# Patient Record
Sex: MALE | Race: WHITE | NOT HISPANIC OR LATINO | Employment: OTHER | URBAN - METROPOLITAN AREA
[De-identification: names, ages, dates, MRNs, and addresses within clinical notes are randomized per-mention and may not be internally consistent; named-entity substitution may affect disease eponyms.]

---

## 2017-02-23 RX ORDER — DILTIAZEM HYDROCHLORIDE 300 MG/1
300 CAPSULE, COATED, EXTENDED RELEASE ORAL DAILY
Qty: 90 CAPSULE | Refills: 2 | Status: SHIPPED | OUTPATIENT
Start: 2017-02-23 | End: 2017-08-15 | Stop reason: SDUPTHER

## 2017-04-07 RX ORDER — SIMVASTATIN 40 MG/1
40 TABLET, FILM COATED ORAL NIGHTLY
Qty: 90 TABLET | Refills: 1 | Status: SHIPPED | OUTPATIENT
Start: 2017-04-07 | End: 2017-07-07 | Stop reason: SDUPTHER

## 2017-04-12 ENCOUNTER — OFFICE VISIT (OUTPATIENT)
Dept: FAMILY MEDICINE | Facility: CLINIC | Age: 77
End: 2017-04-12
Payer: MEDICARE

## 2017-04-12 VITALS
HEART RATE: 68 BPM | HEIGHT: 73 IN | TEMPERATURE: 98 F | BODY MASS INDEX: 28.84 KG/M2 | WEIGHT: 217.63 LBS | SYSTOLIC BLOOD PRESSURE: 131 MMHG | DIASTOLIC BLOOD PRESSURE: 77 MMHG

## 2017-04-12 DIAGNOSIS — I48.20 CHRONIC ATRIAL FIBRILLATION: Primary | ICD-10-CM

## 2017-04-12 DIAGNOSIS — E11.8 TYPE 2 DIABETES MELLITUS WITH COMPLICATION, WITHOUT LONG-TERM CURRENT USE OF INSULIN: ICD-10-CM

## 2017-04-12 DIAGNOSIS — E11.59 CONTROLLED TYPE 2 DIABETES MELLITUS WITH OTHER CIRCULATORY COMPLICATION, WITHOUT LONG-TERM CURRENT USE OF INSULIN: ICD-10-CM

## 2017-04-12 PROCEDURE — 99999 PR PBB SHADOW E&M-EST. PATIENT-LVL III: CPT | Mod: PBBFAC,,, | Performed by: FAMILY MEDICINE

## 2017-04-12 PROCEDURE — 99213 OFFICE O/P EST LOW 20 MIN: CPT | Mod: PBBFAC,PO | Performed by: FAMILY MEDICINE

## 2017-04-12 PROCEDURE — 99214 OFFICE O/P EST MOD 30 MIN: CPT | Mod: S$PBB,,, | Performed by: FAMILY MEDICINE

## 2017-04-12 RX ORDER — REPAGLINIDE 1 MG/1
1 TABLET ORAL
Qty: 180 TABLET | Refills: 3 | Status: SHIPPED | OUTPATIENT
Start: 2017-04-12 | End: 2017-12-26 | Stop reason: SDUPTHER

## 2017-04-12 NOTE — MR AVS SNAPSHOT
[facility]  [address]  [place] [address]  Phone: [phone]  Fax: [phone]                  Domitila Walters   2017 3:40 PM   Office Visit    Description:  Male : 1940   Provider:  Isiah Crespo MD   Department:  [facility]           Reason for Visit     Establish Care     Hypertension     Diabetes           Diagnoses this Visit        Comments    Chronic atrial fibrillation    -  Primary     Controlled type 2 diabetes mellitus with other circulatory complication, without long-term current use of insulin         Type 2 diabetes mellitus with complication, without long-term current use of insulin                To Do List           Future Appointments        Provider Department Dept Phone    2017 9:25 AM [facility] [phone]    2017 10:40 AM Isiah Crespo MD [facility] [phone]      Goals (5 Years of Data)     None      Follow-Up and Disposition     Return in about 3 months (around 2017).       These Medications        Disp Refills Start End    repaglinide (PRANDIN) 1 MG tablet 180 tablet 3 2017    Take 1 tablet (1 mg total) by mouth 2 (two) times daily before meals. - Oral    Pharmacy: [facility] - [place], [place] - [address] #: [phone]         [facility]     [facility] -  Assistance  Unless otherwise directed by your provider, please contact [facility], our nurse care line that is available for  assistance.     Registered nurses in the [facility] provide: appointment scheduling, clinical advisement, health education, and other advisory services.  Call: [phone] (toll free)               Medications           Message regarding Medications     Verify the changes and/or additions to your medication regime listed below are the same as discussed with your clinician today.  If any  "of these changes or additions are incorrect, please notify your healthcare provider.        START taking these NEW medications        Refills    repaglinide (PRANDIN) 1 MG tablet 3    Sig: Take 1 tablet (1 mg total) by mouth 2 (two) times daily before meals.    Class: Normal    Route: Oral      STOP taking these medications     aspirin (ECOTRIN) 81 MG EC tablet Take 1 tablet (81 mg total) by mouth once daily.    glimepiride (AMARYL) 2 MG tablet TAKE ONE TABLET BY MOUTH BEFORE BREAKFAST           Verify that the below list of medications is an accurate representation of the medications you are currently taking.  If none reported, the list may be blank. If incorrect, please contact your healthcare provider. Carry this list with you in case of emergency.           Current Medications     apixaban (ELIQUIS) 2.5 mg Tab Take 2 tablets (5 mg total) by mouth 2 (two) times daily.    diltiaZEM (CARDIZEM CD) 300 MG 24 hr capsule Take 1 capsule (300 mg total) by mouth once daily.    FOLTANX 3-35-2 mg Tab TAKE ONE TABLET BY MOUTH TWICE DAILY    omeprazole (PRILOSEC) 20 MG capsule TAKE ONE CAPSULE BY MOUTH ONCE DAILY    simvastatin (ZOCOR) 40 MG tablet Take 1 tablet (40 mg total) by mouth every evening.    repaglinide (PRANDIN) 1 MG tablet Take 1 tablet (1 mg total) by mouth 2 (two) times daily before meals.           Clinical Reference Information           Your Vitals Were     BP Pulse Temp Height Weight BMI    131/77 (BP Location: Right arm, Patient Position: Sitting, BP Method: Automatic) 68 98.1 °F (36.7 °C) (Oral) 6' 1" (1.854 m) 98.7 kg (217 lb 9.5 oz) 28.71 kg/m2      Blood Pressure          Most Recent Value    BP  131/77      Allergies as of 4/12/2017     No Known Allergies      Immunizations Administered on Date of Encounter - 4/12/2017     None      Orders Placed During Today's Visit     Future Labs/Procedures Expected by Expires    CBC auto differential  4/12/2017 6/11/2018    Comprehensive metabolic panel  4/12/2017 " 6/11/2018    Hemoglobin A1c  4/12/2017 6/11/2018    Lipid panel  4/12/2017 6/11/2018    Magnesium  4/12/2017 6/11/2018    T3, free  4/12/2017 6/11/2018    TSH  4/12/2017 6/11/2018      Language Assistance Services     ATTENTION: Language assistance services are available, free of charge. Please call [phone].      ATENCIÓN: Si habla español, tiene a blackburn disposición servicios gratuitos de asistencia lingüística. Llame al [phone].     CHÚ Ý: N?u b?n nói Ti?ng Vi?t, có các d?ch v? h? tr? ngôn ng? mi?n phí dành cho b?n. G?i s? [phone].         [facility] complies with applicable Federal civil rights laws and does not discriminate on the basis of race, color, national origin, age, disability, or sex.

## 2017-04-13 ENCOUNTER — LAB VISIT (OUTPATIENT)
Dept: LAB | Facility: HOSPITAL | Age: 77
End: 2017-04-13
Attending: FAMILY MEDICINE
Payer: MEDICARE

## 2017-04-13 DIAGNOSIS — E11.59 CONTROLLED TYPE 2 DIABETES MELLITUS WITH OTHER CIRCULATORY COMPLICATION, WITHOUT LONG-TERM CURRENT USE OF INSULIN: ICD-10-CM

## 2017-04-13 DIAGNOSIS — E11.9 TYPE 2 DIABETES MELLITUS WITHOUT COMPLICATION: ICD-10-CM

## 2017-04-13 DIAGNOSIS — I48.20 CHRONIC ATRIAL FIBRILLATION: ICD-10-CM

## 2017-04-13 LAB
ALBUMIN SERPL BCP-MCNC: 3.8 G/DL
ALP SERPL-CCNC: 63 U/L
ALT SERPL W/O P-5'-P-CCNC: 35 U/L
ANION GAP SERPL CALC-SCNC: 10 MMOL/L
AST SERPL-CCNC: 24 U/L
BASOPHILS # BLD AUTO: 0.02 K/UL
BASOPHILS NFR BLD: 0.2 %
BILIRUB SERPL-MCNC: 0.5 MG/DL
BUN SERPL-MCNC: 15 MG/DL
CALCIUM SERPL-MCNC: 8.9 MG/DL
CHLORIDE SERPL-SCNC: 107 MMOL/L
CHOLEST/HDLC SERPL: 2.7 {RATIO}
CO2 SERPL-SCNC: 26 MMOL/L
CREAT SERPL-MCNC: 1.2 MG/DL
DIFFERENTIAL METHOD: ABNORMAL
EOSINOPHIL # BLD AUTO: 0.7 K/UL
EOSINOPHIL NFR BLD: 8.3 %
ERYTHROCYTE [DISTWIDTH] IN BLOOD BY AUTOMATED COUNT: 13.7 %
EST. GFR  (AFRICAN AMERICAN): >60 ML/MIN/1.73 M^2
EST. GFR  (NON AFRICAN AMERICAN): 57.2 ML/MIN/1.73 M^2
GLUCOSE SERPL-MCNC: 130 MG/DL
HCT VFR BLD AUTO: 45.5 %
HDL/CHOLESTEROL RATIO: 37.2 %
HDLC SERPL-MCNC: 156 MG/DL
HDLC SERPL-MCNC: 58 MG/DL
HGB BLD-MCNC: 15.4 G/DL
LDLC SERPL CALC-MCNC: 81.4 MG/DL
LYMPHOCYTES # BLD AUTO: 2.7 K/UL
LYMPHOCYTES NFR BLD: 32.2 %
MAGNESIUM SERPL-MCNC: 2 MG/DL
MCH RBC QN AUTO: 32.5 PG
MCHC RBC AUTO-ENTMCNC: 33.8 %
MCV RBC AUTO: 96 FL
MONOCYTES # BLD AUTO: 0.8 K/UL
MONOCYTES NFR BLD: 9.1 %
NEUTROPHILS # BLD AUTO: 4.2 K/UL
NEUTROPHILS NFR BLD: 50.1 %
NONHDLC SERPL-MCNC: 98 MG/DL
PLATELET # BLD AUTO: 213 K/UL
PMV BLD AUTO: 11.2 FL
POTASSIUM SERPL-SCNC: 4.3 MMOL/L
PROT SERPL-MCNC: 6.9 G/DL
RBC # BLD AUTO: 4.74 M/UL
SODIUM SERPL-SCNC: 143 MMOL/L
T3FREE SERPL-MCNC: 2.8 PG/ML
TRIGL SERPL-MCNC: 83 MG/DL
TSH SERPL DL<=0.005 MIU/L-ACNC: 1.5 UIU/ML
WBC # BLD AUTO: 8.35 K/UL

## 2017-04-13 PROCEDURE — 83735 ASSAY OF MAGNESIUM: CPT

## 2017-04-13 PROCEDURE — 83036 HEMOGLOBIN GLYCOSYLATED A1C: CPT

## 2017-04-13 PROCEDURE — 85025 COMPLETE CBC W/AUTO DIFF WBC: CPT

## 2017-04-13 PROCEDURE — 36415 COLL VENOUS BLD VENIPUNCTURE: CPT | Mod: PO

## 2017-04-13 PROCEDURE — 80053 COMPREHEN METABOLIC PANEL: CPT

## 2017-04-13 PROCEDURE — 84481 FREE ASSAY (FT-3): CPT

## 2017-04-13 PROCEDURE — 80061 LIPID PANEL: CPT

## 2017-04-13 PROCEDURE — 84443 ASSAY THYROID STIM HORMONE: CPT

## 2017-04-15 LAB
ESTIMATED AVG GLUCOSE: 134 MG/DL
HBA1C MFR BLD HPLC: 6.3 %

## 2017-04-19 NOTE — PROGRESS NOTES
Subjective:       Patient ID: Domitila Walters is a 77 y.o. male.    Chief Complaint: Establish Care; Hypertension; and Diabetes    HPI Comments: Patient Active Problem List:     Hyperlipidemia associated with type 2 diabetes mellitus     Hypertension, well controlled     GERD (gastroesophageal reflux disease)     Encounter for long-term (current) use of medications     Hiatal hernia     Actinic keratoses     Type 2 diabetes mellitus with complication     Chronic atrial fibrillation        Hypertension   This is a chronic problem. The problem is controlled. Pertinent negatives include no anxiety, blurred vision, chest pain, headaches, malaise/fatigue, neck pain, orthopnea, palpitations or shortness of breath. There are no associated agents to hypertension. Risk factors for coronary artery disease include diabetes mellitus, dyslipidemia, male gender and sedentary lifestyle. Hypertensive end-organ damage includes CAD/MI.   Diabetes   He presents for his follow-up diabetic visit. He has type 2 diabetes mellitus. His disease course has been improving. Pertinent negatives for hypoglycemia include no confusion, dizziness, headaches, pallor, speech difficulty or tremors. Pertinent negatives for diabetes include no blurred vision, no chest pain, no fatigue, no polydipsia, no polyphagia and no polyuria. Diabetic complications include heart disease. His breakfast blood glucose is taken between 8-9 am. His breakfast blood glucose range is generally 110-130 mg/dl.     Review of Systems   Constitutional: Negative.  Negative for activity change, appetite change, chills, diaphoresis, fatigue, fever, malaise/fatigue and unexpected weight change.   HENT: Negative.  Negative for congestion, drooling, ear discharge, ear pain, hearing loss, mouth sores, nosebleeds, postnasal drip, rhinorrhea, sinus pressure, sore throat, tinnitus, trouble swallowing and voice change.    Eyes: Negative.  Negative for blurred vision, pain, discharge,  redness, itching and visual disturbance.   Respiratory: Negative.  Negative for apnea, cough, choking, chest tightness and shortness of breath.    Cardiovascular: Negative.  Negative for chest pain, palpitations, orthopnea and leg swelling.   Gastrointestinal: Negative.  Negative for abdominal distention, abdominal pain and anal bleeding.   Endocrine: Negative.  Negative for cold intolerance, heat intolerance, polydipsia, polyphagia and polyuria.   Genitourinary: Negative.  Negative for difficulty urinating, dysuria, enuresis, flank pain, frequency, hematuria, scrotal swelling, testicular pain and urgency.   Musculoskeletal: Negative.  Negative for arthralgias, back pain, gait problem, neck pain and neck stiffness.   Skin: Negative.  Negative for color change, pallor and rash.   Allergic/Immunologic: Negative.  Negative for environmental allergies and food allergies.   Neurological: Negative.  Negative for dizziness, tremors, syncope, facial asymmetry, speech difficulty, light-headedness, numbness and headaches.   Hematological: Negative for adenopathy. Does not bruise/bleed easily.   Psychiatric/Behavioral: Negative.  Negative for agitation, behavioral problems, confusion, decreased concentration, dysphoric mood and hallucinations. The patient is not hyperactive.        Objective:      Physical Exam   Constitutional: He is oriented to person, place, and time. He appears well-developed and well-nourished. No distress.   HENT:   Head: Normocephalic and atraumatic.   Right Ear: External ear normal.   Left Ear: External ear normal.   Nose: Nose normal.   Mouth/Throat: Oropharynx is clear and moist. No oropharyngeal exudate.   Eyes: Conjunctivae and EOM are normal. Pupils are equal, round, and reactive to light. Right eye exhibits no discharge. Left eye exhibits no discharge. No scleral icterus.   Neck: Normal range of motion. Neck supple. No JVD present. No tracheal deviation present. No thyromegaly present.    Cardiovascular: Normal rate, normal heart sounds and intact distal pulses.    No murmur heard.  Pulses:       Dorsalis pedis pulses are 3+ on the right side, and 3+ on the left side.        Posterior tibial pulses are 3+ on the right side, and 3+ on the left side.   Pulmonary/Chest: Effort normal and breath sounds normal. No respiratory distress. He has no wheezes. He has no rales.   Abdominal: Soft. Bowel sounds are normal. He exhibits no distension and no mass. There is no tenderness. There is no rebound and no guarding.   Musculoskeletal: He exhibits no edema or tenderness.        Right foot: There is normal range of motion and no deformity.          Feet:   Right Foot:   Protective Sensation: 6 sites tested. 6 sites sensed.   Skin Integrity: Negative for ulcer.   Left Foot:   Protective Sensation: 6 sites tested. 6 sites sensed.   Skin Integrity: Negative for ulcer or blister.   Lymphadenopathy:     He has no cervical adenopathy.   Neurological: He is alert and oriented to person, place, and time. No cranial nerve deficit. Coordination normal.   Skin: Skin is warm and dry. No rash noted. He is not diaphoretic. No erythema. No pallor.   Psychiatric: He has a normal mood and affect. His behavior is normal. Judgment and thought content normal.   Vitals reviewed.        Chemistry        Component Value Date/Time     04/13/2017 0823    K 4.3 04/13/2017 0823     04/13/2017 0823    CO2 26 04/13/2017 0823    BUN 15 04/13/2017 0823    CREATININE 1.2 04/13/2017 0823     (H) 04/13/2017 0823        Component Value Date/Time    CALCIUM 8.9 04/13/2017 0823    ALKPHOS 63 04/13/2017 0823    AST 24 04/13/2017 0823    ALT 35 04/13/2017 0823    BILITOT 0.5 04/13/2017 0823        Lab Results   Component Value Date    HGBA1C 6.3 (H) 04/13/2017       Assessment:       1. Chronic atrial fibrillation    2. Controlled type 2 diabetes mellitus with other circulatory complication, without long-term current use of  insulin    3. Type 2 diabetes mellitus with complication, without long-term current use of insulin        Plan:       Chronic atrial fibrillation  -     TSH; Future; Expected date: 4/12/17  -     T3, free; Future; Expected date: 4/12/17  -     Magnesium; Future; Expected date: 4/12/17    Controlled type 2 diabetes mellitus with other circulatory complication, without long-term current use of insulin  -     repaglinide (PRANDIN) 1 MG tablet; Take 1 tablet (1 mg total) by mouth 2 (two) times daily before meals.  Dispense: 180 tablet; Refill: 3  -     Lipid panel; Future; Expected date: 4/12/17  -     Comprehensive metabolic panel; Future; Expected date: 4/12/17  -     Hemoglobin A1c; Future; Expected date: 4/12/17  -     CBC auto differential; Future; Expected date: 4/12/17    Type 2 diabetes mellitus with complication, without long-term current use of insulin      Patient readiness: acceptance and barriers:readiness    During the course of the visit the patient was educated and counseled about the following:     Diabetes:  Discussed general issues about diabetes pathophysiology and management.  Hypertension:   Dietary sodium restriction.  Regular aerobic exercise.  Check blood pressures daily and record.  Obesity:   General weight loss/lifestyle modification strategies discussed (elicit support from others; identify saboteurs; non-food rewards, etc).    Goals: Diabetes: Maintain Hemoglobin A1C below 7, Hypertension: Reduce Blood Pressure and Obesity: Reduce calorie intake and BMI    Did patient meet goals/outcomes: Yes    The following self management tools provided: blood pressure log  blood glucose log  excercise log    Patient Instructions (the written plan) was given to the patient/family.     Time spent with patient: 45 minutes

## 2017-04-20 RX ORDER — OMEPRAZOLE 20 MG/1
20 CAPSULE, DELAYED RELEASE ORAL DAILY
Qty: 90 CAPSULE | Refills: 3 | Status: SHIPPED | OUTPATIENT
Start: 2017-04-20 | End: 2018-09-27 | Stop reason: SDUPTHER

## 2017-06-23 DIAGNOSIS — K11.23 CHRONIC SIALOADENITIS: Primary | ICD-10-CM

## 2017-06-29 ENCOUNTER — HOSPITAL ENCOUNTER (OUTPATIENT)
Dept: RADIOLOGY | Facility: HOSPITAL | Age: 77
Discharge: HOME | End: 2017-06-29
Attending: OTOLARYNGOLOGY
Payer: MEDICARE

## 2017-06-29 DIAGNOSIS — K11.23 CHRONIC SIALOADENITIS: ICD-10-CM

## 2017-06-29 PROCEDURE — 70492 CT SFT TSUE NCK W/O & W/DYE: CPT | Mod: TC

## 2017-06-29 PROCEDURE — 70492 CT SFT TSUE NCK W/O & W/DYE: CPT | Mod: 26,,, | Performed by: RADIOLOGY

## 2017-06-29 PROCEDURE — 25500020 PHARM REV CODE 255

## 2017-06-29 RX ADMIN — IOHEXOL 75 ML: 350 INJECTION, SOLUTION INTRAVENOUS at 09:06

## 2017-07-08 RX ORDER — SIMVASTATIN 40 MG/1
TABLET, FILM COATED ORAL
Qty: 90 TABLET | Refills: 0 | Status: SHIPPED | OUTPATIENT
Start: 2017-07-08 | End: 2017-07-13 | Stop reason: SDUPTHER

## 2017-07-12 ENCOUNTER — DOCUMENTATION ONLY (OUTPATIENT)
Dept: FAMILY MEDICINE | Facility: CLINIC | Age: 77
End: 2017-07-12

## 2017-07-12 NOTE — PROGRESS NOTES
Pre-Visit Chart Review  For Appointment Scheduled on 07/13/2017    Health Maintenance Due   Topic Date Due    TETANUS VACCINE  03/03/1956    Zoster Vaccine  03/03/1998    Pneumococcal (65+) (1 of 2 - PCV13) 03/03/2003    Eye Exam  02/03/2017

## 2017-07-13 ENCOUNTER — OFFICE VISIT (OUTPATIENT)
Dept: FAMILY MEDICINE | Facility: CLINIC | Age: 77
End: 2017-07-13
Payer: MEDICARE

## 2017-07-13 VITALS
SYSTOLIC BLOOD PRESSURE: 130 MMHG | HEIGHT: 73 IN | DIASTOLIC BLOOD PRESSURE: 58 MMHG | BODY MASS INDEX: 28.05 KG/M2 | TEMPERATURE: 99 F | HEART RATE: 44 BPM | WEIGHT: 211.63 LBS

## 2017-07-13 DIAGNOSIS — E11.69 HYPERLIPIDEMIA ASSOCIATED WITH TYPE 2 DIABETES MELLITUS: ICD-10-CM

## 2017-07-13 DIAGNOSIS — Z23 NEED FOR TETANUS BOOSTER: ICD-10-CM

## 2017-07-13 DIAGNOSIS — E78.5 HYPERLIPIDEMIA ASSOCIATED WITH TYPE 2 DIABETES MELLITUS: ICD-10-CM

## 2017-07-13 DIAGNOSIS — E11.8 TYPE 2 DIABETES MELLITUS WITH COMPLICATION, WITHOUT LONG-TERM CURRENT USE OF INSULIN: Primary | ICD-10-CM

## 2017-07-13 DIAGNOSIS — L57.0 ACTINIC KERATOSES: ICD-10-CM

## 2017-07-13 DIAGNOSIS — I48.20 CHRONIC ATRIAL FIBRILLATION: ICD-10-CM

## 2017-07-13 PROCEDURE — 99999 PR PBB SHADOW E&M-EST. PATIENT-LVL III: CPT | Mod: PBBFAC,,, | Performed by: FAMILY MEDICINE

## 2017-07-13 PROCEDURE — 90714 TD VACC NO PRESV 7 YRS+ IM: CPT | Mod: PBBFAC,PO

## 2017-07-13 PROCEDURE — 99213 OFFICE O/P EST LOW 20 MIN: CPT | Mod: PBBFAC,PO | Performed by: FAMILY MEDICINE

## 2017-07-13 PROCEDURE — 99214 OFFICE O/P EST MOD 30 MIN: CPT | Mod: S$PBB,,, | Performed by: FAMILY MEDICINE

## 2017-07-13 PROCEDURE — 1159F MED LIST DOCD IN RCRD: CPT | Mod: ,,, | Performed by: FAMILY MEDICINE

## 2017-07-13 RX ORDER — SIMVASTATIN 40 MG/1
40 TABLET, FILM COATED ORAL NIGHTLY
Qty: 90 TABLET | Refills: 4 | Status: SHIPPED | OUTPATIENT
Start: 2017-07-13 | End: 2018-09-13 | Stop reason: SDUPTHER

## 2017-07-13 NOTE — PROGRESS NOTES
Subjective:       Patient ID: Domitila Walters is a 77 y.o. male.    Chief Complaint: Diabetes and Hypertension    Diabetes   He presents for his follow-up diabetic visit. He has type 2 diabetes mellitus. There are no hypoglycemic associated symptoms. Pertinent negatives for hypoglycemia include no confusion, dizziness, headaches, pallor, speech difficulty or tremors. Pertinent negatives for diabetes include no chest pain, no fatigue, no polydipsia, no polyphagia and no polyuria. There are no hypoglycemic complications. Diabetic complications include heart disease. Risk factors for coronary artery disease include diabetes mellitus, dyslipidemia, male sex, obesity and sedentary lifestyle. He is following a generally healthy diet. His breakfast blood glucose range is generally 130-140 mg/dl.   Hypertension   Pertinent negatives include no chest pain, headaches, neck pain, palpitations or shortness of breath.     Review of Systems   Constitutional: Negative.  Negative for activity change, appetite change, chills, diaphoresis, fatigue, fever and unexpected weight change.   HENT: Negative.  Negative for congestion, drooling, ear discharge, ear pain, hearing loss, mouth sores, nosebleeds, postnasal drip, rhinorrhea, sinus pressure, sore throat, tinnitus, trouble swallowing and voice change.    Eyes: Negative.  Negative for pain, discharge, redness, itching and visual disturbance.   Respiratory: Negative.  Negative for apnea, cough, choking, chest tightness and shortness of breath.    Cardiovascular: Negative.  Negative for chest pain, palpitations and leg swelling.   Gastrointestinal: Negative.  Negative for abdominal distention, abdominal pain and anal bleeding.   Endocrine: Negative.  Negative for cold intolerance, heat intolerance, polydipsia, polyphagia and polyuria.   Genitourinary: Negative.  Negative for difficulty urinating, dysuria, enuresis, flank pain, frequency, hematuria, scrotal swelling, testicular pain and  urgency.   Musculoskeletal: Negative.  Negative for arthralgias, back pain, gait problem, neck pain and neck stiffness.   Skin: Negative.  Negative for color change, pallor and rash.   Allergic/Immunologic: Negative.  Negative for environmental allergies and food allergies.   Neurological: Negative.  Negative for dizziness, tremors, syncope, facial asymmetry, speech difficulty, light-headedness, numbness and headaches.   Hematological: Negative for adenopathy. Does not bruise/bleed easily.   Psychiatric/Behavioral: Negative.  Negative for agitation, behavioral problems, confusion, decreased concentration, dysphoric mood and hallucinations. The patient is not hyperactive.        Objective:      Physical Exam   Constitutional: He is oriented to person, place, and time. He appears well-developed and well-nourished. No distress.   HENT:   Head: Normocephalic and atraumatic.   Right Ear: External ear normal.   Left Ear: External ear normal.   Nose: Nose normal.   Mouth/Throat: Oropharynx is clear and moist. No oropharyngeal exudate.   Eyes: Conjunctivae and EOM are normal. Pupils are equal, round, and reactive to light. Right eye exhibits no discharge. Left eye exhibits no discharge. No scleral icterus.   Neck: Normal range of motion. Neck supple. No JVD present. No tracheal deviation present. No thyromegaly present.   Cardiovascular: Normal rate, normal heart sounds and intact distal pulses.    No murmur heard.  Pulses:       Dorsalis pedis pulses are 3+ on the right side, and 3+ on the left side.        Posterior tibial pulses are 3+ on the right side, and 3+ on the left side.   Pulmonary/Chest: Effort normal and breath sounds normal. No respiratory distress. He has no wheezes. He has no rales.   Abdominal: Soft. Bowel sounds are normal. He exhibits no distension and no mass. There is no tenderness. There is no rebound and no guarding.   Musculoskeletal: He exhibits no edema or tenderness.        Right foot: There is  normal range of motion and no deformity.          Feet:   Right Foot:   Protective Sensation: 6 sites tested. 6 sites sensed.   Skin Integrity: Negative for ulcer.   Left Foot:   Protective Sensation: 6 sites tested. 6 sites sensed.   Skin Integrity: Negative for ulcer or blister.   Lymphadenopathy:     He has no cervical adenopathy.   Neurological: He is alert and oriented to person, place, and time. No cranial nerve deficit. Coordination normal.   Skin: Skin is warm and dry. No rash noted. He is not diaphoretic. No erythema. No pallor.   Psychiatric: He has a normal mood and affect. His behavior is normal. Judgment and thought content normal.   Vitals reviewed.      Assessment:       1. Type 2 diabetes mellitus with complication, without long-term current use of insulin    2. Hyperlipidemia associated with type 2 diabetes mellitus    3. Need for tetanus booster        Plan:       Type 2 diabetes mellitus with complication, without long-term current use of insulin    Hyperlipidemia associated with type 2 diabetes mellitus    Need for tetanus booster  -     (In Office Administered) Td Vaccine - Preservative Free    Other orders  -     simvastatin (ZOCOR) 40 MG tablet; Take 1 tablet (40 mg total) by mouth every evening.  Dispense: 90 tablet; Refill: 4      Patient readiness: acceptance and barriers:readiness and social stressors    During the course of the visit the patient was educated and counseled about the following:     Diabetes:  Discussed general issues about diabetes pathophysiology and management.  Hypertension:   Dietary sodium restriction.  Regular aerobic exercise.  Obesity:   General weight loss/lifestyle modification strategies discussed (elicit support from others; identify saboteurs; non-food rewards, etc).    Goals: Diabetes: Maintain Hemoglobin A1C below 7, Hypertension: Reduce Blood Pressure and Obesity: Reduce calorie intake and BMI    Did patient meet goals/outcomes: Yes    The following self  management tools provided: blood pressure log  blood glucose log  excercise log    Patient Instructions (the written plan) was given to the patient/family.     Time spent with patient: 45 minutes

## 2017-07-13 NOTE — PATIENT INSTRUCTIONS
Diabetes (General Information)  Diabetes is a long-term health problem. It means your body does not make enough insulin. Or it may mean that your body cannot use the insulin it makes. Insulin is a hormone in your body. It lets blood sugar (glucose) reach the cells in your body. All of your cells need glucose for fuel.  When you have diabetes, the glucose in your blood builds up because it cannot get into the cells. This buildup is called high blood sugar (hyperglycemia).  Your blood sugar level depends on several things. It depends on what kind of food you eat and how much of it you eat. It also depends on how much exercise you get, and how much insulin you have in your body. Eating too much of the wrong kinds of food or not taking diabetes medicine on time can cause high blood sugar. Infections can cause high blood sugar even if you are taking medicines correctly.  These things can also cause low blood sugar:  · Missing meals  · Not eating enough food  · Taking too much diabetes medicine  Diabetes can cause serious problems over time if you do not get treated. These problems include heart disease, stroke, kidney failure, and blindness. They also include nerve pain or loss of feeling in your legs and feet, and gangrene of the feet. By keeping your blood sugar under control you can prevent or delay these problems.  Normal blood sugar levels are 80 to 100 before a meal and less than 180 in the 1 to 2 hours after a meal.  Home care  Follow these guidelines when caring for yourself at home:  · Follow the diet your healthcare provider gives you. Take insulin or other diabetes medicine exactly as told to.  · Watch your blood sugar as you are told to. Keep a log of your results. This will help your provider change your medicines to keep your blood sugar under control.  · Try to reach your ideal weight. You may be able to cut back on or not have to take diabetes medicine if you eat the right foods and get exercise.  · Do  not smoke. Smoking worsens the effects of diabetes on your circulation. You are much more likely to have a heart attack if you have diabetes and you smoke.  · Take good care of your feet. If you have lost feeling in your feet, you may not see an injury or infection. Check your feet and between your toes at least once a week.  · Wear a medical alert bracelet or necklace, or carry a card in your wallet that says you have diabetes. This will help healthcare providers give you the right care if you get very ill and cannot tell them that you have diabetes.  Sick day plan  If you get a cold, the flu, or a bacterial or viral infection, take these steps:  · Look at your diabetes sick plan and call your healthcare provider as you were told to. You may need to call your provider right away if:  ¨ Your blood sugar is above 240 while taking your diabetes medicine  ¨ Your urine ketone levels are above normal or high  ¨ You have been vomiting more than 6 hours  ¨ You have trouble breathing or your breath ha s a fruity smell  ¨ You have a high fever  ¨ You have a fever for several days and you are not getting better  ¨ You get light-headed and are sleepier than usual  · Keep taking your diabetes pills (oral medicine) even if you have been vomiting and are feeling sick. Call your provider right away because you may need insulin to lower your blood sugar until you recover from your illness.  · Keep taking your insulin even if you have been vomiting and are feeling sick. Call your provider right away to ask if you need to change your insulin dose. This will depend on your blood sugar results.  · Check your blood sugar every 2 to 4 hours, or at least 4 times a day.  · Check your ketones often. If you are vomiting and having diarrhea, watch them more often.  · Do not skip meals. Try to eat small meals on a regular schedule. Do this even if you do not feel like eating.  · Drink water or other liquids that do not have caffeine or  calories. This will keep you from getting dehydrated. If you are nauseated or vomiting, takes small sips every 5 minutes. To prevent dehydration try to drink a cup (8 ounces) of fluids every hour while you are awake.  General care  Always bring a source of fast-acting sugar with you in case you have symptoms of low blood sugar (below 70). At the first sign of low blood sugar, eat or drink 15 to 20 grams of fast-acting sugar to raise your blood sugar. Examples are:  · 3 to 4 glucose tablets. You can buy these at most [facility].  · 4 ounces (1/2 cup) of regular (not diet) soft drinks  · 4 ounces (1/2 cup) of any fruit juice  · 8 ounces (1 cup) of milk  · 5 to 6 pieces of hard candy  · 1 tablespoon of honey  Check your blood sugar 15 minutes after treating yourself. If it is still below 70, take 15 to 20 more grams of fast-acting sugar. Test again in 15 minutes. If it returns to normal (70 or above), eat a snack or meal to keep your blood sugar in a safe range. If it stays low, call your doctor or go to an emergency room.  Follow-up care  Follow-up with your healthcare provider, or as advised. For more information about diabetes, visit the American Diabetes Association website at www.diabetes.org or call 995-181-6425.  When to seek medical advice  Call your healthcare provider right away if you have any of these symptoms of high blood sugar:  · Frequent urination  · Dizziness  · Drowsiness  · Thirst  · Headache  · Nausea or vomiting  · Abdominal pain  · Eyesight changes  · Fast breathing  · Confusion or loss of consciousness  Also call your provider right away if you have any of these signs of low blood sugar:  · Fatigue  · Headache  · Shakes  · Excess sweating  · Hunger  · Feeling anxious or restless  · Eyesight changes  · Drowsiness  · Weakness  · Confusion or loss of consciousness  Call 911  Call for emergency help right away if any of these occur:  · Chest pain or shortness of breath  · Dizziness or  fainting  · Weakness of an arm or leg or one side of the face  · Trouble speaking or seeing   Date Last Reviewed: 6/1/2016  © 6398-0323 [employer]. [address] [place] [address], [place] [address]. All rights reserved. This information is not intended as a substitute for professional medical care. Always follow your healthcare professional's instructions.        Established High Blood Pressure    High blood pressure (hypertension) is a chronic disease. Often health care providers dont know what causes it. But it can be caused by certain health conditions and medicines.  If you have high blood pressure, you may not have any symptoms. If you do have symptoms, they may include headache, dizziness, changes in your vision, chest pain, and shortness of breath. But even without symptoms, high blood pressure thats not treated raises your risk for heart attack and stroke. High blood pressure is a serious health risk and shouldnt be ignored.  A blood pressure reading is made up of two numbers: a higher number over a lower number. The top number is the systolic pressure. The bottom number is the diastolic pressure. A normal blood pressure is less than 120 over less than 80.  High blood pressure is when either the top number is 140 or higher, or the bottom number is 90 or higher. This must be the result when taking your blood pressure a number of times. The blood pressures between normal and high are called prehypertension.  Home care  If you have high blood pressure, you should do what is listed below to lower your blood pressure. If you are taking medicines for high blood pressure, these methods may reduce or end your need for medicines in the future.  · Begin a weight-loss program if you are overweight.  · Cut back on how much salt you get in your diet. Heres how to do this:  ¨ Dont eat foods that have a lot of salt. These include olives, pickles, smoked meats, and salted potato chips.  ¨ Dont add salt to your  food at the table.  ¨ Use only small amounts of salt when cooking.  · Begin an exercise program. Talk with your health care provider about the type of exercise program that would be best for you. It doesn't have to be hard. Even brisk walking for 20 minutes 3 times a week is a good form of exercise.  · Dont take medicines that have heart stimulants. This includes many cold and sinus decongestant pills and sprays, as well as diet pills. Check the warnings about hypertension on the label. Stimulants such as amphetamine or cocaine could be lethal for someone with high blood pressure. Never take these.  · Limit how much caffeine you get in your diet. Switch to caffeine-free products.  · Stop smoking. If you are a long-time smoker, this can be hard. Enroll in a stop-smoking program to make it more likely that you will quit for good.  · Learn how to handle stress. This is an important part of any program to lower blood pressure. Learn about relaxation methods like meditation, yoga, or biofeedback.  · If your provider prescribed medicines, take them exactly as directed. Missing doses may cause your blood pressure get out of control.  · Consider buying an automatic blood pressure machine. You can get one of these at most pharmacies. Use this to watch your blood pressure at home. Give the results to your provider.  Follow-up care  You will need to make regular visits to your health care provider. This is to check your blood pressure and to make changes to your medicines. Make a follow-up appointment as directed.  When to seek medical advice  Call your health care provider right away if any of these occur:  · Chest pain or shortness of breath  · Severe headache  · Throbbing or rushing sound in the ears  · Nosebleed  · Sudden severe pain in your belly (abdomen)  · Extreme drowsiness, confusion, or fainting  · Dizziness or dizziness with a spinning sensation (vertigo)  · Weakness of an arm or leg or one side of the face  · You  have problems speaking or seeing   Date Last Reviewed: 11/25/2014  © 6763-2634 [employer]. [address] [place] [address], [place] [address]. All rights reserved. This information is not intended as a substitute for professional medical care. Always follow your healthcare professional's instructions.

## 2017-07-13 NOTE — PROGRESS NOTES
2 patient identifiers used (name and ). Administered Td vaccine IM. Patient tolerated well, no bleeding at insertion site noted. Pain scale 1/10. Aseptic technique maintained. Immunization information given to patient.

## 2017-07-14 DIAGNOSIS — R22.1 NECK MASS: Primary | ICD-10-CM

## 2017-07-19 ENCOUNTER — HOSPITAL ENCOUNTER (OUTPATIENT)
Dept: RADIOLOGY | Facility: HOSPITAL | Age: 77
Discharge: HOME | End: 2017-07-19
Attending: OTOLARYNGOLOGY
Payer: MEDICARE

## 2017-07-19 DIAGNOSIS — R22.1 NECK MASS: ICD-10-CM

## 2017-07-19 PROCEDURE — 70543 MRI ORBT/FAC/NCK W/O &W/DYE: CPT | Mod: TC

## 2017-07-19 PROCEDURE — A9585 GADOBUTROL INJECTION: HCPCS | Performed by: OTOLARYNGOLOGY

## 2017-07-19 PROCEDURE — 70543 MRI ORBT/FAC/NCK W/O &W/DYE: CPT | Mod: 26,,, | Performed by: RADIOLOGY

## 2017-07-19 PROCEDURE — 25500020 PHARM REV CODE 255: Performed by: OTOLARYNGOLOGY

## 2017-07-19 RX ORDER — GADOBUTROL 604.72 MG/ML
9 INJECTION INTRAVENOUS
Status: COMPLETED | OUTPATIENT
Start: 2017-07-19 | End: 2017-07-19

## 2017-07-19 RX ADMIN — GADOBUTROL 9 ML: 604.72 INJECTION INTRAVENOUS at 05:07

## 2017-08-08 ENCOUNTER — OFFICE VISIT (OUTPATIENT)
Dept: OTOLARYNGOLOGY | Facility: CLINIC | Age: 77
End: 2017-08-08
Payer: MEDICARE

## 2017-08-08 VITALS
BODY MASS INDEX: 28.1 KG/M2 | TEMPERATURE: 98 F | WEIGHT: 212.94 LBS | DIASTOLIC BLOOD PRESSURE: 89 MMHG | SYSTOLIC BLOOD PRESSURE: 133 MMHG | HEART RATE: 74 BPM

## 2017-08-08 DIAGNOSIS — D37.039 NEOPLASM OF UNCERTAIN BEHAVIOR OF MAJOR SALIVARY GLAND: ICD-10-CM

## 2017-08-08 PROCEDURE — 3008F BODY MASS INDEX DOCD: CPT | Mod: ,,, | Performed by: OTOLARYNGOLOGY

## 2017-08-08 PROCEDURE — 3075F SYST BP GE 130 - 139MM HG: CPT | Mod: ,,, | Performed by: OTOLARYNGOLOGY

## 2017-08-08 PROCEDURE — 1126F AMNT PAIN NOTED NONE PRSNT: CPT | Mod: ,,, | Performed by: OTOLARYNGOLOGY

## 2017-08-08 PROCEDURE — 99214 OFFICE O/P EST MOD 30 MIN: CPT | Mod: PBBFAC | Performed by: OTOLARYNGOLOGY

## 2017-08-08 PROCEDURE — 99999 PR PBB SHADOW E&M-EST. PATIENT-LVL IV: CPT | Mod: PBBFAC,,, | Performed by: OTOLARYNGOLOGY

## 2017-08-08 PROCEDURE — 1159F MED LIST DOCD IN RCRD: CPT | Mod: ,,, | Performed by: OTOLARYNGOLOGY

## 2017-08-08 PROCEDURE — 99204 OFFICE O/P NEW MOD 45 MIN: CPT | Mod: S$PBB,,, | Performed by: OTOLARYNGOLOGY

## 2017-08-08 PROCEDURE — 3079F DIAST BP 80-89 MM HG: CPT | Mod: ,,, | Performed by: OTOLARYNGOLOGY

## 2017-08-08 NOTE — PROGRESS NOTES
"[facility]    Subjective:       Patient ID: Domitila Walters is a 77 y.o. male.    Chief Complaint: Consult (left parotid mass)    HPI     Pertinent Treatment History:  1. I+D of left parotid mass, 8/26/2013 (Dr. Montanez)    Domitila Walters is a 77 y.o. male who was referred by Dr. Saad Herrera for evaluation of a left parotid mass.  He dates his history to about 3 years ago, when he developed left parotid swelling and redness that ultimately triggered a transfer to [facility] and merited an I+D. He had another episode of enlargement/swelling with pain extending to the ear and mandible last year, and then another within the last month. He met with Dr. Herrera at that time and underwent a CT scan and an MRI, prompting a referral to me. He has no pain currently, but there is an "awareness" that there is something there. He has no history of local or regional skin cancers, although he has had some AKs treated with cryotherapy or biopsy over the years on his arms.  Between the 3 episodes, he did not notice a mass or issue with the left parotid gland.     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 He was treated with prednisone with some response - he has been treated with antibiotics in the past without improvement. He denies dysphagia, odynophagia, throat pain, and otalgia. He is a non-smoker. The patient has experienced such swelling previously. He has no axillary or inguinal adenopathy and denies fevers, chills, nightsweats, fatigue, and weight loss. He denies facial weakness, but there is some expected numbness around his prior incision.     He presents for evaluation and management of this problem.     Past Medical History:   Diagnosis Date    GERD (gastroesophageal reflux disease)     Hyperlipemia     Hypertension        Past Surgical History:   Procedure Laterality Date    ABCESS DRAINAGE Left 08/26/2013    COLONOSCOPY   "    SKIN GRAFT Left     TONSILLECTOMY           Current Outpatient Prescriptions:     apixaban (ELIQUIS) 2.5 mg Tab, Take 2 tablets (5 mg total) by mouth 2 (two) times daily., Disp: 60 tablet, Rfl: 10    diltiaZEM (CARDIZEM CD) 300 MG 24 hr capsule, Take 1 capsule (300 mg total) by mouth once daily., Disp: 90 capsule, Rfl: 2    FOLTANX 3-35-2 mg Tab, TAKE ONE TABLET BY MOUTH TWICE DAILY, Disp: 180 tablet, Rfl: 0    omeprazole (PRILOSEC) 20 MG capsule, Take 1 capsule (20 mg total) by mouth once daily., Disp: 90 capsule, Rfl: 3    repaglinide (PRANDIN) 1 MG tablet, Take 1 tablet (1 mg total) by mouth 2 (two) times daily before meals., Disp: 180 tablet, Rfl: 3    simvastatin (ZOCOR) 40 MG tablet, Take 1 tablet (40 mg total) by mouth every evening., Disp: 90 tablet, Rfl: 4    Review of patient's allergies indicates:  No Known Allergies    Social History     Social History    Marital status:      Spouse name: N/A    Number of children: N/A    Years of education: N/A     Occupational History    Not on file.     Social History Main Topics    Smoking status: Never Smoker    Smokeless tobacco: Not on file    Alcohol use Yes      Comment: occassionally    Drug use: No    Sexual activity: Not on file     Other Topics Concern    Not on file     Social History Narrative    He is retired from the [employer] industry. 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            Family History   Problem Relation Age of Onset    No Known Problems Mother     No Known Problems Father     No Known Problems Maternal Grandmother     No Known Problems Maternal Grandfather     Glaucoma Neg Hx     Macular degeneration Neg Hx     Retinal detachment Neg Hx     Cancer Neg Hx     Diabetes Neg Hx     Heart failure Neg Hx      Review of Systems   Constitutional: Negative for fatigue, fever and unexpected weight change.   HENT: Negative for ear discharge, facial swelling, hearing loss,  mouth sores, rhinorrhea, sore throat, tinnitus, trouble swallowing and voice change.    Eyes: Negative for pain and visual disturbance.   Respiratory: Negative for cough and shortness of breath.    Cardiovascular: Negative for chest pain and palpitations.   Gastrointestinal: Negative for abdominal pain, constipation and diarrhea.   Genitourinary: Negative for difficulty urinating and dysuria.   Musculoskeletal: Positive for arthralgias. Negative for back pain and neck pain.   Skin: Negative for color change and rash.   Neurological: Negative for dizziness, seizures and headaches.   Hematological: Positive for adenopathy (left neck mass). Does not bruise/bleed easily.   Psychiatric/Behavioral: Negative for agitation. The patient is not nervous/anxious.        Objective:      Physical Exam   Constitutional: He is oriented to person, place, and time. He appears well-developed and well-nourished. He is cooperative.   HENT:   Head: Normocephalic and atraumatic.       Right Ear: Hearing, tympanic membrane, external ear and ear canal normal.   Left Ear: Hearing, tympanic membrane, external ear and ear canal normal.   Nose: Nose normal. No rhinorrhea, nasal deformity or septal deviation. Right sinus exhibits no maxillary sinus tenderness and no frontal sinus tenderness. Left sinus exhibits no maxillary sinus tenderness and no frontal sinus tenderness.   Mouth/Throat: Uvula is midline, oropharynx is clear and moist and mucous membranes are normal. He does not have dentures. No oral lesions. No trismus in the jaw. No oropharyngeal exudate or posterior oropharyngeal edema.       .   Eyes: Conjunctivae and EOM are normal. Pupils are equal, round, and reactive to light. Right eye exhibits no chemosis. Left eye exhibits no chemosis.   Neck: Trachea normal, normal range of motion and phonation normal. Neck supple. No JVD present. No tracheal tenderness present. No tracheal deviation and no edema present. No thyroid mass and no  thyromegaly present.   Salivary glands - there are no lesions, and there is no asymmetry of the parotid and submandibular glands   Cardiovascular: Normal rate, regular rhythm and intact distal pulses.    Pulmonary/Chest: Effort normal. No accessory muscle usage or stridor. No tachypnea. No respiratory distress.   Abdominal: Normal appearance. He exhibits no distension. There is no guarding.   Lymphadenopathy:     He has no cervical adenopathy.        Right cervical: No deep cervical and no posterior cervical adenopathy present.       Left cervical: No deep cervical and no posterior cervical adenopathy present.        Right: No supraclavicular adenopathy present.        Left: No supraclavicular adenopathy present.   Neurological: He is alert and oriented to person, place, and time. No cranial nerve deficit. Gait normal.   Skin: Skin is warm and dry. No lesion noted.   Psychiatric: He has a normal mood and affect. His speech is normal.   Vitals reviewed.      MRI 7/19/2017:  Impression      Left parotid space solid mass with considerations including benign mixed tumor, Warthin tumor, low-grade adenoid cystic carcinoma, and low-grade mucoid epidermoid carcinoma.  High-grade malignancy is not excluded however less likely given circumscribed, non-infiltrative appearance.  Biopsy or excision is recommended.     CT Neck 6/29/2017:  Impression       1.  Approximately 3 cm, well-circumscribed ovoid soft tissue mass involving the deep lobe of the left parotid gland. Common differential considerations include a benign mixed tumor and a Warthin tumor.    2. Otherwise, no CT findings to explain this patient's symptoms. Chronic sinus disease and evidence of prior paranasal sinus surgery is also noted.     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 The edges on the MRI are a little fuzzy, but the math favors  a benign etiology. However, in order to facilitate surgical counseling and shared decision making, I have ordered an ultrasound and ultrasound FNA of the parotid. He will return after the FNA for treatment planning. We did discuss that his options are fundamentally observation (based on the probability that this is benign), the US-FNA, or moving directly to surgery. He is aware that surgery may be the ultimate recommendation, but both he and I would like some more information moving forward.         Relevant Orders    US Soft Tissue Head Neck Thyroid    US Fine Needle Aspiration with Imaging      Other Visit Diagnoses    None.

## 2017-08-08 NOTE — ASSESSMENT & PLAN NOTE
He has a deep lobe left parotid tumor. The edges on the MRI are a little fuzzy, but the math favors a benign etiology. However, in order to facilitate surgical counseling and shared decision making, I have ordered an ultrasound and ultrasound FNA of the parotid. He will return after the FNA for treatment planning. We did discuss that his options are fundamentally observation (based on the probability that this is benign), the US-FNA, or moving directly to surgery. He is aware that surgery may be the ultimate recommendation, but both he and I would like some more information moving forward.

## 2017-08-08 NOTE — LETTER
August 8, 2017      Saad Herrera MD  [address]  [facility] [address]           [facility]  [address]  [facility] [address]  Phone: [phone]  Fax: [phone]          Patient: Domitila Walters   MR Number: [id number]   YOB: 1940   Date of Visit: 8/8/2017       Dear Dr. Saad Herrera:    Thank you for referring Domitila Walters to me for evaluation. Attached you will find relevant portions of my assessment and plan of care.    If you have questions, please do not hesitate to call me. I look forward to following Domitila Walters along with you.    Sincerely,    Sam Bo MD    Enclosure  CC:  No Recipients    If you would like to receive this communication electronically, please contact [email or web address] or [phone] to request more information on [facility] access.    For providers and/or their staff who would like to refer a patient to [facility], please contact us through our one-stop-shop provider referral line, [facility], at [phone].    If you feel you have received this communication in error or would no longer like to receive these types of communications, please e-mail [email or web address]

## 2017-08-15 RX ORDER — DILTIAZEM HYDROCHLORIDE 300 MG/1
CAPSULE, COATED, EXTENDED RELEASE ORAL
Qty: 90 CAPSULE | Refills: 11 | Status: SHIPPED | OUTPATIENT
Start: 2017-08-15 | End: 2018-10-25 | Stop reason: SDUPTHER

## 2017-08-21 ENCOUNTER — HOSPITAL ENCOUNTER (OUTPATIENT)
Dept: RADIOLOGY | Facility: HOSPITAL | Age: 77
Discharge: HOME | End: 2017-08-21
Attending: OTOLARYNGOLOGY
Payer: MEDICARE

## 2017-08-21 DIAGNOSIS — D37.039 NEOPLASM OF UNCERTAIN BEHAVIOR OF MAJOR SALIVARY GLAND: ICD-10-CM

## 2017-08-21 PROCEDURE — 76536 US EXAM OF HEAD AND NECK: CPT | Mod: 26,GC,, | Performed by: RADIOLOGY

## 2017-08-21 PROCEDURE — 76536 US EXAM OF HEAD AND NECK: CPT | Mod: TC

## 2017-09-25 ENCOUNTER — PATIENT MESSAGE (OUTPATIENT)
Dept: OTOLARYNGOLOGY | Facility: CLINIC | Age: 77
End: 2017-09-25

## 2017-09-25 DIAGNOSIS — D37.039 NEOPLASM OF UNCERTAIN BEHAVIOR OF MAJOR SALIVARY GLAND: Primary | ICD-10-CM

## 2017-09-25 DIAGNOSIS — I48.91 ATRIAL FIBRILLATION, UNSPECIFIED TYPE: ICD-10-CM

## 2017-09-27 ENCOUNTER — PATIENT MESSAGE (OUTPATIENT)
Dept: OTOLARYNGOLOGY | Facility: CLINIC | Age: 77
End: 2017-09-27

## 2017-09-28 ENCOUNTER — LAB VISIT (OUTPATIENT)
Dept: LAB | Facility: HOSPITAL | Age: 77
End: 2017-09-28
Attending: NURSE PRACTITIONER
Payer: MEDICARE

## 2017-09-28 DIAGNOSIS — D37.039 NEOPLASM OF UNCERTAIN BEHAVIOR OF MAJOR SALIVARY GLAND: ICD-10-CM

## 2017-09-28 DIAGNOSIS — I48.91 ATRIAL FIBRILLATION, UNSPECIFIED TYPE: ICD-10-CM

## 2017-09-28 LAB
BASOPHILS # BLD AUTO: 0 K/UL
BASOPHILS NFR BLD: 0.4 %
DIFFERENTIAL METHOD: ABNORMAL
EOSINOPHIL # BLD AUTO: 0.4 K/UL
EOSINOPHIL NFR BLD: 3.8 %
ERYTHROCYTE [DISTWIDTH] IN BLOOD BY AUTOMATED COUNT: 13.9 %
HCT VFR BLD AUTO: 41 %
HGB BLD-MCNC: 13.8 G/DL
INR PPP: 1
LYMPHOCYTES # BLD AUTO: 2.3 K/UL
LYMPHOCYTES NFR BLD: 25 %
MCH RBC QN AUTO: 32.5 PG
MCHC RBC AUTO-ENTMCNC: 33.6 G/DL
MCV RBC AUTO: 97 FL
MONOCYTES # BLD AUTO: 0.7 K/UL
MONOCYTES NFR BLD: 7.5 %
NEUTROPHILS # BLD AUTO: 5.8 K/UL
NEUTROPHILS NFR BLD: 63.3 %
PLATELET # BLD AUTO: 396 K/UL
PMV BLD AUTO: 7.7 FL
PROTHROMBIN TIME: 10.7 SEC
RBC # BLD AUTO: 4.25 M/UL
WBC # BLD AUTO: 9.2 K/UL

## 2017-09-28 PROCEDURE — 85025 COMPLETE CBC W/AUTO DIFF WBC: CPT

## 2017-09-28 PROCEDURE — 85610 PROTHROMBIN TIME: CPT

## 2017-09-28 PROCEDURE — 36415 COLL VENOUS BLD VENIPUNCTURE: CPT

## 2017-09-28 RX ORDER — APIXABAN 5 MG/1
TABLET, FILM COATED ORAL
Qty: 180 TABLET | Refills: 3 | Status: SHIPPED | OUTPATIENT
Start: 2017-09-28 | End: 2018-01-05 | Stop reason: SDUPTHER

## 2017-10-04 ENCOUNTER — HOSPITAL ENCOUNTER (OUTPATIENT)
Dept: RADIOLOGY | Facility: HOSPITAL | Age: 77
Discharge: HOME | End: 2017-10-04
Attending: OTOLARYNGOLOGY
Payer: MEDICARE

## 2017-10-04 DIAGNOSIS — D37.039 NEOPLASM OF UNCERTAIN BEHAVIOR OF MAJOR SALIVARY GLAND: ICD-10-CM

## 2017-10-04 NOTE — H&P
Radiology History & Physical      SUBJECTIVE:     Chief Complaint: L parotid lesion    History of Present Illness:  Domitila Walters is a 77 y.o. 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 Authorizing Provider   apixaban (ELIQUIS) 2.5 mg Tab Take 2 tablets (5 mg total) by mouth 2 (two) times daily. 9/23/16   Rafal Lewis MD   diltiaZEM (CARDIZEM CD) 300 MG 24 hr capsule TAKE ONE CAPSULE BY MOUTH EVERY DAY 8/15/17   Isiah Crespo MD   ELIQUIS 5 mg Tab TAKE ONE TABLET BY MOUTH TWICE DAILY 9/28/17   Rafal Lewis MD   FOLTANX 3-35-2 mg Tab TAKE ONE TABLET BY MOUTH TWICE DAILY 4/1/16   Scott Marie DPM   omeprazole (PRILOSEC) 20 MG capsule Take 1 capsule (20 mg total) by mouth once daily. 4/20/17   MARLEN Harris   repaglinide (PRANDIN) 1 MG tablet Take 1 tablet (1 mg total) by mouth 2 (two) times daily before meals. 4/12/17 4/12/18  Isiah Crespo MD   simvastatin (ZOCOR) 40 MG tablet Take 1 tablet (40 mg total) by mouth every evening. 7/13/17   Isiah Crespo MD     Anticoagulants/Antiplatelets: eloquis held only 24 hours    Allergies: Review of patient's allergies indicates:  No Known Allergies  Sedation History:  no adverse reactions    Review of Systems:   Hematological: no known coagulopathies  Respiratory: no shortness of breath  Cardiovascular: no chest pain  Gastrointestinal: no abdominal pain  Genito-Urinary: no dysuria  Musculoskeletal: negative  Neurological: no TIA or stroke symptoms         OBJECTIVE:     Vital Signs (Most Recent)        Physical Exam:  ASA: 2  Mallampati: 2    General: no acute distress  Mental Status: alert and oriented to person, place and time  HEENT: normocephalic, atraumatic  Chest: unlabored breathing  Heart: regular heart rate  Abdomen: nondistended  Extremity: moves all extremities    Laboratory  Lab Results   Component Value Date    INR 1.0 09/28/2017       Lab Results   Component Value Date    WBC 9.20 09/28/2017    HGB 13.8 (L) 09/28/2017    HCT 41.0 09/28/2017    MCV 97 09/28/2017     (H) 09/28/2017      Lab Results   Component Value Date     (H) 06/29/2017     06/29/2017    K 4.2 06/29/2017     06/29/2017    CO2 29 06/29/2017    BUN 17 06/29/2017    CREATININE 1.1 06/29/2017    CALCIUM 8.9 06/29/2017    MG 2.0 04/13/2017    ALT 35 04/13/2017    AST 24 04/13/2017    ALBUMIN 3.8 04/13/2017    BILITOT 0.5 04/13/2017       ASSESSMENT/PLAN:     Sedation none  Plan: Patient to be rescheduled for tomorrow at 2PM to increase risk of bleeding    Daniel Bolton MD  Radiology

## 2017-10-05 ENCOUNTER — HOSPITAL ENCOUNTER (OUTPATIENT)
Dept: RADIOLOGY | Facility: HOSPITAL | Age: 77
Discharge: HOME | End: 2017-10-05
Attending: OTOLARYNGOLOGY
Payer: MEDICARE

## 2017-10-05 PROCEDURE — 88305 TISSUE EXAM BY PATHOLOGIST: CPT | Performed by: PATHOLOGY

## 2017-10-05 PROCEDURE — 88172 CYTP DX EVAL FNA 1ST EA SITE: CPT | Mod: 26,,, | Performed by: PATHOLOGY

## 2017-10-05 PROCEDURE — 88173 CYTOPATH EVAL FNA REPORT: CPT | Mod: 26,,, | Performed by: PATHOLOGY

## 2017-10-05 PROCEDURE — 10022 US FINE NEEDLE ASPIRATION WITH IMAGING: CPT | Mod: GC,,, | Performed by: RADIOLOGY

## 2017-10-05 PROCEDURE — 76942 ECHO GUIDE FOR BIOPSY: CPT | Mod: TC

## 2017-10-05 PROCEDURE — 88172 CYTP DX EVAL FNA 1ST EA SITE: CPT | Performed by: PATHOLOGY

## 2017-10-05 PROCEDURE — 88305 TISSUE EXAM BY PATHOLOGIST: CPT | Mod: 26,,, | Performed by: PATHOLOGY

## 2017-10-05 PROCEDURE — 76942 ECHO GUIDE FOR BIOPSY: CPT | Mod: 26,GC,, | Performed by: RADIOLOGY

## 2017-10-05 NOTE — PROCEDURES
Radiology Post-Procedure Note    Pre Op Diagnosis: Parotid nodule    Post Op Diagnosis: same    Procedure: Ultrasound guided parotid biopsy    Procedure performed by: Daniel Murguia MD    Written Informed Consent Obtained: Yes    Specimen Removed: YES - 2 core 18 G biopsy samples.     Estimated Blood Loss: Minimal    Findings: Local anesthesia was used.    18G core biopsy was performed for evaluation of left sided parotid nodule using ultrasound guidance.  Pathology was present during the examination to evaluate adequacy of the specimen which was sent to the laboratory for further analysis.    There were no complications and the patient tolerated the procedure well.  Please see Imaging report for further details.    Derek Wagner  R-4  Pager: [phone]

## 2017-10-05 NOTE — H&P
Radiology History & Physical      SUBJECTIVE:     Chief Complaint: Left parotid lesion.     History of Present Illness:  Domitila Walters is a 77 y.o. male who presents for US guided core biopsy and aspiration of a left parotid mixed cystic and solid lesion.     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 Authorizing Provider   apixaban (ELIQUIS) 2.5 mg Tab Take 2 tablets (5 mg total) by mouth 2 (two) times daily. 9/23/16   Rafal Lewis MD   diltiaZEM (CARDIZEM CD) 300 MG 24 hr capsule TAKE ONE CAPSULE BY MOUTH EVERY DAY 8/15/17   Isiah Crespo MD   ELIQUIS 5 mg Tab TAKE ONE TABLET BY MOUTH TWICE DAILY 9/28/17   Rafal Lewis MD   FOLTANX 3-35-2 mg Tab TAKE ONE TABLET BY MOUTH TWICE DAILY 4/1/16   Scott Marie DPM   omeprazole (PRILOSEC) 20 MG capsule Take 1 capsule (20 mg total) by mouth once daily. 4/20/17   MARLEN Harris   repaglinide (PRANDIN) 1 MG tablet Take 1 tablet (1 mg total) by mouth 2 (two) times daily before meals. 4/12/17 4/12/18  Isiah Crespo MD   simvastatin (ZOCOR) 40 MG tablet Take 1 tablet (40 mg total) by mouth every evening. 7/13/17   Isiah Crespo MD     Anticoagulants/Antiplatelets: Apixaban held x 24 hours.     Allergies: Review of patient's allergies indicates:  No Known Allergies  Sedation History:  no adverse reactions    Review of Systems:   Hematological: no known coagulopathies  Respiratory: no shortness of breath  Cardiovascular: no chest pain  Gastrointestinal: no abdominal pain  Genito-Urinary: no dysuria  Musculoskeletal: negative  Neurological: no TIA or stroke symptoms          OBJECTIVE:     Vital Signs (Most Recent)       Physical Exam:  ASA: 2    General: no acute distress  Mental Status: alert and oriented to person, place and time  HEENT: normocephalic, atraumatic  Chest: unlabored breathing  Abdomen: nondistended  Extremity: moves all extremities    Laboratory  Lab Results   Component Value Date    INR 1.0 09/28/2017       Lab Results   Component Value Date    WBC 9.20 09/28/2017    HGB 13.8 (L) 09/28/2017    HCT 41.0 09/28/2017    MCV 97 09/28/2017     (H) 09/28/2017      Lab Results   Component Value Date     (H) 06/29/2017     06/29/2017    K 4.2 06/29/2017     06/29/2017    CO2 29 06/29/2017    BUN 17 06/29/2017    CREATININE 1.1 06/29/2017    CALCIUM 8.9 06/29/2017    MG 2.0 04/13/2017    ALT 35 04/13/2017    AST 24 04/13/2017    ALBUMIN 3.8 04/13/2017    BILITOT 0.5 04/13/2017       ASSESSMENT/PLAN:     Sedation Plan: Local anesthesia.   Patient will undergo US guided core biopsy and possible aspiration of a left parotid lesion.    Derek ESCOBAR-4  Pager: [phone]

## 2017-10-15 ENCOUNTER — PATIENT MESSAGE (OUTPATIENT)
Dept: OTOLARYNGOLOGY | Facility: CLINIC | Age: 77
End: 2017-10-15

## 2017-11-27 RX ORDER — MECOBAL/LEVOMEFOLAT CA/B6 PHOS 2-3-35 MG
TABLET ORAL
Qty: 180 TABLET | Refills: 0 | Status: SHIPPED | OUTPATIENT
Start: 2017-11-27 | End: 2018-01-05 | Stop reason: ALTCHOICE

## 2017-12-26 DIAGNOSIS — E11.59 CONTROLLED TYPE 2 DIABETES MELLITUS WITH OTHER CIRCULATORY COMPLICATION, WITHOUT LONG-TERM CURRENT USE OF INSULIN: ICD-10-CM

## 2017-12-26 RX ORDER — REPAGLINIDE 1 MG/1
TABLET ORAL
Qty: 180 TABLET | Refills: 0 | Status: SHIPPED | OUTPATIENT
Start: 2017-12-26 | End: 2018-05-24 | Stop reason: SDUPTHER

## 2017-12-29 ENCOUNTER — LAB VISIT (OUTPATIENT)
Dept: LAB | Facility: HOSPITAL | Age: 77
End: 2017-12-29
Attending: FAMILY MEDICINE
Payer: MEDICARE

## 2017-12-29 DIAGNOSIS — E11.8 TYPE 2 DIABETES MELLITUS WITH COMPLICATION, WITHOUT LONG-TERM CURRENT USE OF INSULIN: ICD-10-CM

## 2017-12-29 LAB
ANION GAP SERPL CALC-SCNC: 8 MMOL/L
BASOPHILS # BLD AUTO: 0.04 K/UL
BASOPHILS NFR BLD: 0.6 %
BUN SERPL-MCNC: 14 MG/DL
CALCIUM SERPL-MCNC: 9.2 MG/DL
CHLORIDE SERPL-SCNC: 107 MMOL/L
CO2 SERPL-SCNC: 28 MMOL/L
CREAT SERPL-MCNC: 1.1 MG/DL
DIFFERENTIAL METHOD: ABNORMAL
EOSINOPHIL # BLD AUTO: 0.6 K/UL
EOSINOPHIL NFR BLD: 8.8 %
ERYTHROCYTE [DISTWIDTH] IN BLOOD BY AUTOMATED COUNT: 13.2 %
EST. GFR  (AFRICAN AMERICAN): >60 ML/MIN/1.73 M^2
EST. GFR  (NON AFRICAN AMERICAN): >60 ML/MIN/1.73 M^2
ESTIMATED AVG GLUCOSE: 134 MG/DL
GLUCOSE SERPL-MCNC: 118 MG/DL
HBA1C MFR BLD HPLC: 6.3 %
HCT VFR BLD AUTO: 45.4 %
HGB BLD-MCNC: 15.6 G/DL
IMM GRANULOCYTES # BLD AUTO: 0.02 K/UL
IMM GRANULOCYTES NFR BLD AUTO: 0.3 %
LYMPHOCYTES # BLD AUTO: 2.2 K/UL
LYMPHOCYTES NFR BLD: 31.5 %
MAGNESIUM SERPL-MCNC: 1.9 MG/DL
MCH RBC QN AUTO: 31.9 PG
MCHC RBC AUTO-ENTMCNC: 34.4 G/DL
MCV RBC AUTO: 93 FL
MONOCYTES # BLD AUTO: 0.6 K/UL
MONOCYTES NFR BLD: 8.2 %
NEUTROPHILS # BLD AUTO: 3.6 K/UL
NEUTROPHILS NFR BLD: 50.6 %
NRBC BLD-RTO: 0 /100 WBC
PLATELET # BLD AUTO: 237 K/UL
PMV BLD AUTO: 10.6 FL
POTASSIUM SERPL-SCNC: 4.5 MMOL/L
RBC # BLD AUTO: 4.89 M/UL
SODIUM SERPL-SCNC: 143 MMOL/L
WBC # BLD AUTO: 7.08 K/UL

## 2017-12-29 PROCEDURE — 83036 HEMOGLOBIN GLYCOSYLATED A1C: CPT

## 2017-12-29 PROCEDURE — 83735 ASSAY OF MAGNESIUM: CPT

## 2017-12-29 PROCEDURE — 85025 COMPLETE CBC W/AUTO DIFF WBC: CPT

## 2017-12-29 PROCEDURE — 80048 BASIC METABOLIC PNL TOTAL CA: CPT

## 2017-12-29 PROCEDURE — 36415 COLL VENOUS BLD VENIPUNCTURE: CPT | Mod: PO

## 2018-01-04 ENCOUNTER — DOCUMENTATION ONLY (OUTPATIENT)
Dept: FAMILY MEDICINE | Facility: CLINIC | Age: 78
End: 2018-01-04

## 2018-01-04 NOTE — PROGRESS NOTES
Pre-Visit Chart Review  For Appointment Scheduled on 1/5/18    Health Maintenance Due   Topic Date Due    Zoster Vaccine  03/03/1998    Pneumococcal (65+) (1 of 2 - PCV13) 03/03/2003    Eye Exam  02/03/2017    Influenza Vaccine  08/01/2017    Urine Microalbumin  08/06/2017

## 2018-01-05 ENCOUNTER — LAB VISIT (OUTPATIENT)
Dept: LAB | Facility: HOSPITAL | Age: 78
End: 2018-01-05
Attending: FAMILY MEDICINE
Payer: MEDICARE

## 2018-01-05 ENCOUNTER — OFFICE VISIT (OUTPATIENT)
Dept: FAMILY MEDICINE | Facility: CLINIC | Age: 78
End: 2018-01-05
Payer: MEDICARE

## 2018-01-05 VITALS
SYSTOLIC BLOOD PRESSURE: 135 MMHG | DIASTOLIC BLOOD PRESSURE: 83 MMHG | HEART RATE: 53 BPM | BODY MASS INDEX: 28.43 KG/M2 | WEIGHT: 214.5 LBS | HEIGHT: 73 IN | TEMPERATURE: 98 F

## 2018-01-05 DIAGNOSIS — E11.69 HYPERLIPIDEMIA ASSOCIATED WITH TYPE 2 DIABETES MELLITUS: Primary | ICD-10-CM

## 2018-01-05 DIAGNOSIS — E11.8 TYPE 2 DIABETES MELLITUS WITH COMPLICATION, WITHOUT LONG-TERM CURRENT USE OF INSULIN: ICD-10-CM

## 2018-01-05 DIAGNOSIS — M79.2 PERIPHERAL NEUROPATHIC PAIN: ICD-10-CM

## 2018-01-05 DIAGNOSIS — G25.89 OTHER SPECIFIED EXTRAPYRAMIDAL AND MOVEMENT DISORDERS: ICD-10-CM

## 2018-01-05 DIAGNOSIS — Z23 NEED FOR PNEUMOCOCCAL VACCINATION: ICD-10-CM

## 2018-01-05 DIAGNOSIS — I10 HYPERTENSION, WELL CONTROLLED: ICD-10-CM

## 2018-01-05 DIAGNOSIS — E78.5 HYPERLIPIDEMIA ASSOCIATED WITH TYPE 2 DIABETES MELLITUS: Primary | ICD-10-CM

## 2018-01-05 LAB
FOLATE SERPL-MCNC: 18.3 NG/ML
TSH SERPL DL<=0.005 MIU/L-ACNC: 1.34 UIU/ML
VIT B12 SERPL-MCNC: >2000 PG/ML

## 2018-01-05 PROCEDURE — 99999 PR PBB SHADOW E&M-EST. PATIENT-LVL III: CPT | Mod: PBBFAC,,, | Performed by: FAMILY MEDICINE

## 2018-01-05 PROCEDURE — 84443 ASSAY THYROID STIM HORMONE: CPT

## 2018-01-05 PROCEDURE — 82607 VITAMIN B-12: CPT

## 2018-01-05 PROCEDURE — 99214 OFFICE O/P EST MOD 30 MIN: CPT | Mod: S$PBB,,, | Performed by: FAMILY MEDICINE

## 2018-01-05 PROCEDURE — 99213 OFFICE O/P EST LOW 20 MIN: CPT | Mod: PBBFAC,PO,25 | Performed by: FAMILY MEDICINE

## 2018-01-05 PROCEDURE — 82746 ASSAY OF FOLIC ACID SERUM: CPT

## 2018-01-05 PROCEDURE — G0009 ADMIN PNEUMOCOCCAL VACCINE: HCPCS | Mod: PBBFAC,PO

## 2018-01-05 PROCEDURE — 36415 COLL VENOUS BLD VENIPUNCTURE: CPT | Mod: PO

## 2018-01-05 NOTE — PROGRESS NOTES
Subjective:       Patient ID: Domitila Walters is a 77 y.o. male.    Chief Complaint: follow up diabetes    Diabetes   He presents for his follow-up diabetic visit. He has type 2 diabetes mellitus. There are no hypoglycemic associated symptoms. Pertinent negatives for hypoglycemia include no confusion, dizziness, headaches, pallor, speech difficulty or tremors. Associated symptoms include foot paresthesias. Pertinent negatives for diabetes include no chest pain, no fatigue, no polydipsia, no polyphagia and no polyuria. There are no hypoglycemic complications. Diabetic complications include heart disease. Risk factors for coronary artery disease include diabetes mellitus, dyslipidemia, male sex, obesity and sedentary lifestyle. He is following a generally healthy diet. His breakfast blood glucose range is generally 130-140 mg/dl.   Hypertension   Pertinent negatives include no chest pain, headaches, neck pain, palpitations or shortness of breath.     Review of Systems   Constitutional: Negative.  Negative for activity change, appetite change, chills, diaphoresis, fatigue, fever and unexpected weight change.   HENT: Negative.  Negative for congestion, drooling, ear discharge, ear pain, hearing loss, mouth sores, nosebleeds, postnasal drip, rhinorrhea, sinus pressure, sore throat, tinnitus, trouble swallowing and voice change.    Eyes: Negative.  Negative for pain, discharge, redness, itching and visual disturbance.   Respiratory: Negative.  Negative for apnea, cough, choking, chest tightness and shortness of breath.    Cardiovascular: Negative.  Negative for chest pain, palpitations and leg swelling.   Gastrointestinal: Negative.  Negative for abdominal distention, abdominal pain and anal bleeding.   Endocrine: Negative.  Negative for cold intolerance, heat intolerance, polydipsia, polyphagia and polyuria.   Genitourinary: Negative.  Negative for difficulty urinating, dysuria, enuresis, flank pain, frequency, hematuria,  scrotal swelling, testicular pain and urgency.   Musculoskeletal: Negative.  Negative for arthralgias, back pain, gait problem, neck pain and neck stiffness.   Skin: Negative.  Negative for color change, pallor and rash.   Allergic/Immunologic: Negative.  Negative for environmental allergies and food allergies.   Neurological: Negative.  Negative for dizziness, tremors, syncope, facial asymmetry, speech difficulty, light-headedness, numbness and headaches.   Hematological: Negative for adenopathy. Does not bruise/bleed easily.   Psychiatric/Behavioral: Negative.  Negative for agitation, behavioral problems, confusion, decreased concentration, dysphoric mood and hallucinations. The patient is not hyperactive.        Objective:      Physical Exam   Constitutional: He is oriented to person, place, and time. He appears well-developed and well-nourished. No distress.   HENT:   Head: Normocephalic and atraumatic.   Right Ear: External ear normal.   Left Ear: External ear normal.   Nose: Nose normal.   Mouth/Throat: Oropharynx is clear and moist. No oropharyngeal exudate.   Eyes: Conjunctivae and EOM are normal. Pupils are equal, round, and reactive to light. Right eye exhibits no discharge. Left eye exhibits no discharge. No scleral icterus.   Neck: Normal range of motion. Neck supple. No JVD present. No tracheal deviation present. No thyromegaly present.   Cardiovascular: Normal rate, normal heart sounds and intact distal pulses.    No murmur heard.  Pulses:       Dorsalis pedis pulses are 3+ on the right side, and 3+ on the left side.        Posterior tibial pulses are 3+ on the right side, and 3+ on the left side.   Pulmonary/Chest: Effort normal and breath sounds normal. No respiratory distress. He has no wheezes. He has no rales.   Abdominal: Soft. Bowel sounds are normal. He exhibits no distension and no mass. There is no tenderness. There is no rebound and no guarding.   Musculoskeletal: He exhibits no edema or  tenderness.        Right foot: There is normal range of motion and no deformity.          Feet:   Right Foot:   Protective Sensation: 6 sites tested. 6 sites sensed.   Skin Integrity: Negative for ulcer.   Left Foot:   Protective Sensation: 6 sites tested. 6 sites sensed.   Skin Integrity: Negative for ulcer or blister.   Lymphadenopathy:     He has no cervical adenopathy.   Neurological: He is alert and oriented to person, place, and time. No cranial nerve deficit. Coordination normal.   Skin: Skin is warm and dry. Rash noted. He is not diaphoretic. There is erythema. No pallor.   Psychiatric: He has a normal mood and affect. His behavior is normal. Judgment and thought content normal.   Vitals reviewed.      Assessment:       1. Hyperlipidemia associated with type 2 diabetes mellitus    2. Hypertension, well controlled    3. Type 2 diabetes mellitus with complication, without long-term current use of insulin    4. Peripheral neuropathic pain    5. Other specified extrapyramidal and movement disorders     6. Need for pneumococcal vaccination        Plan:       Hyperlipidemia associated with type 2 diabetes mellitus    Hypertension, well controlled    Type 2 diabetes mellitus with complication, without long-term current use of insulin  -     Vitamin B12; Future; Expected date: 01/05/2018  -     Folate; Future; Expected date: 01/05/2018  -     TSH; Future; Expected date: 01/05/2018    Peripheral neuropathic pain  -     folic acid-vit B6-vit B12 2.5-25-2 mg (FOLBIC OR EQUIV) 2.5-25-2 mg Tab; Take 1 tablet by mouth once daily.  Dispense: 90 tablet; Refill: 4  -     Vitamin B12; Future; Expected date: 01/05/2018  -     Folate; Future; Expected date: 01/05/2018  -     TSH; Future; Expected date: 01/05/2018    Other specified extrapyramidal and movement disorders   -     Vitamin B12; Future; Expected date: 01/05/2018  -     Folate; Future; Expected date: 01/05/2018  -     TSH; Future; Expected date: 01/05/2018    Need for  pneumococcal vaccination  -     (In Office Administered) Pneumococcal Polysaccharide Vaccine (23 Valent) (SQ/IM)      Patient readiness: acceptance and barriers:readiness and social stressors    During the course of the visit the patient was educated and counseled about the following:     Diabetes:  Discussed general issues about diabetes pathophysiology and management.  Hypertension:   Dietary sodium restriction.  Regular aerobic exercise.  Obesity:   General weight loss/lifestyle modification strategies discussed (elicit support from others; identify saboteurs; non-food rewards, etc).    Goals: Diabetes: Maintain Hemoglobin A1C below 7, Hypertension: Reduce Blood Pressure and Obesity: Reduce calorie intake and BMI    Did patient meet goals/outcomes: Yes    The following self management tools provided: blood pressure log  blood glucose log  excercise log    Patient Instructions (the written plan) was given to the patient/family.     Time spent with patient: 45 minutes

## 2018-01-05 NOTE — PATIENT INSTRUCTIONS

## 2018-02-01 ENCOUNTER — OFFICE VISIT (OUTPATIENT)
Dept: OTOLARYNGOLOGY | Facility: CLINIC | Age: 78
End: 2018-02-01
Payer: MEDICARE

## 2018-02-01 VITALS
WEIGHT: 213.19 LBS | BODY MASS INDEX: 28.13 KG/M2 | HEART RATE: 80 BPM | TEMPERATURE: 98 F | SYSTOLIC BLOOD PRESSURE: 133 MMHG | DIASTOLIC BLOOD PRESSURE: 72 MMHG

## 2018-02-01 DIAGNOSIS — I48.20 CHRONIC ATRIAL FIBRILLATION: ICD-10-CM

## 2018-02-01 DIAGNOSIS — D37.039 NEOPLASM OF UNCERTAIN BEHAVIOR OF MAJOR SALIVARY GLAND: Primary | ICD-10-CM

## 2018-02-01 DIAGNOSIS — K21.9 GASTROESOPHAGEAL REFLUX DISEASE WITHOUT ESOPHAGITIS: ICD-10-CM

## 2018-02-01 DIAGNOSIS — E11.8 TYPE 2 DIABETES MELLITUS WITH COMPLICATION, WITHOUT LONG-TERM CURRENT USE OF INSULIN: ICD-10-CM

## 2018-02-01 DIAGNOSIS — E78.5 HYPERLIPIDEMIA ASSOCIATED WITH TYPE 2 DIABETES MELLITUS: ICD-10-CM

## 2018-02-01 DIAGNOSIS — I10 HYPERTENSION, WELL CONTROLLED: ICD-10-CM

## 2018-02-01 DIAGNOSIS — D49.89 NEOPLASM OF FACE: ICD-10-CM

## 2018-02-01 DIAGNOSIS — E11.69 HYPERLIPIDEMIA ASSOCIATED WITH TYPE 2 DIABETES MELLITUS: ICD-10-CM

## 2018-02-01 PROCEDURE — 99214 OFFICE O/P EST MOD 30 MIN: CPT | Mod: S$PBB,,, | Performed by: OTOLARYNGOLOGY

## 2018-02-01 PROCEDURE — 1126F AMNT PAIN NOTED NONE PRSNT: CPT | Mod: ,,, | Performed by: OTOLARYNGOLOGY

## 2018-02-01 PROCEDURE — 99999 PR PBB SHADOW E&M-EST. PATIENT-LVL IV: CPT | Mod: PBBFAC,,, | Performed by: OTOLARYNGOLOGY

## 2018-02-01 PROCEDURE — 1159F MED LIST DOCD IN RCRD: CPT | Mod: ,,, | Performed by: OTOLARYNGOLOGY

## 2018-02-01 PROCEDURE — 99214 OFFICE O/P EST MOD 30 MIN: CPT | Mod: PBBFAC | Performed by: OTOLARYNGOLOGY

## 2018-02-01 RX ORDER — LIDOCAINE HYDROCHLORIDE 10 MG/ML
1 INJECTION, SOLUTION EPIDURAL; INFILTRATION; INTRACAUDAL; PERINEURAL ONCE
Status: CANCELLED | OUTPATIENT
Start: 2018-02-01 | End: 2018-02-01

## 2018-02-01 NOTE — ASSESSMENT & PLAN NOTE
He has a deep lobe left parotid tumor. The edges on the MRI are a little fuzzy, but the math favors a benign etiology, and the prior I+D could account for this. His FNA was negative, but not 100% confirmatory. Unfortunately, his symptoms have persisted and worsened, and he is more interested in surgery. His daughter inquired as to whether there are medical management options. Because there is a tumor present, it is unclear if the symptoms are related to the mass itself or to the impact of the mass on normal salivary function. It is external to the ductal system, so sialoendoscopy will not be particularly valuable. Other than continuing to manage his symptoms with pain medication or nerve stabilizers (neurontin), as well as massage and sialogogues, there are no other reasonable medical options. I do not support long term steroids for this. I have therefore recommended surgery, which will likely involve a total parotidectomy. I would recommend an abdominal dermis fat graft for reconstruction of the defect. I would like to get repeat imaging, because if this is cancer or there is evidence of progression or cahrisma disease, then we would need to alter the surgical plan.     We then discussed the risks, benefits, indications, and alternatives to left parotidectomy.  The risks were noted to include, but not be limited to, infection, bleeding, scarring, partial or total weakness of one or all of the branches of the facial nerve, eye dryness and potential injury with nerve weakness, numbness of the ear and the side of the face, gustatory sweating (Keith's syndrome - which was described as sweating while eating due to cholinergic stimulation of sweat glands in the absence of salivary tissue), first bite syndrome (pain upon initial bite of food after dissection in the parapharyngeal space, tumor spillage, recurrence, collection of blood or tissue fluid, sialolcele formation requiring drainage, and the need for additional  procedures.  The benefits in this case will be diagnostic and potentially therapeutic, and the indication is a parotid mass.  The chief alternative, in the absence of a diagnosis, is observation or repeat needle biopsy.  Time was allowed for questions, and all questions were answered to the patient's apparent satisfaction.  Informed consent was obtained.    Given the potential defect size, we also discussed options for managing the post-ablative defect after parotidectomy. Options include alloderm grafting which would assist with minimizing gustatory sweating but provides limited volume, local flaps, and dermis-fat grafting. I recommended dermis-fat grafting from the lower abdomen. I then discussed the risks, benefits, indications, and alternatives to a dermis-fat graft for parotid defect reconstruction. The risks were noted to include, but not be limited to, infection, bleeding, scarring, resorption, cosmetic deformity, an incision on the lower abdomen, and the need for additional procedures. We will use an existing scar if one is present, otherwise, there will be a new scar on the lower abdomen. Time was allowed for questions, and all questions were answered to the patient's apparent satisfaction. Informed consent was obtained.      He will get his repeat imaging, and then we will proceed with surgery. Paperwork will be signed on the morning of surgery.

## 2018-02-01 NOTE — PROGRESS NOTES
[facility]    Subjective:       Patient ID: Domitila Walters is a 77 y.o. male.    Chief Complaint: Follow-up    HPI     Pertinent Treatment History:  1. I+D of left parotid mass, 8/26/2013 (Dr. Montanez)    Domitila Walters is a 77 y.o. male who returns for evaluation of a left deep lobe parotid mass. He was previously referred by Dr. Saad Herrera for evaluation of a left parotid mass.  He dates his history to about 3 years ago, when he developed left parotid swelling and redness that ultimately triggered a transfer to [facility] and merited an I+D. He met with Dr. Herrera at that time and underwent a CT scan and an MRI, prompting a referral to me. He reports several bouts of swelling and pain that have occurred over the past couple of months. He has taken neurontin with some benefit, steroids with some benefit, and even hydrocodone at night. 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      He denies dysphagia, odynophagia, throat pain, and otalgia. He is a non-smoker. The patient has experienced such swelling previously. He has no axillary or inguinal adenopathy and denies fevers, chills, nightsweats, fatigue, and weight loss. He denies facial weakness, but there is some expected numbness around his prior incision.     He presents today with his daughter, a nurse, to discuss his management options.     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       Past Surgical History:   Procedure Laterality Date    ABCESS DRAINAGE Left 08/26/2013    COLONOSCOPY      SKIN GRAFT Left     TONSILLECTOMY           Current Outpatient Prescriptions:     apixaban (ELIQUIS) 2.5 mg Tab, Take 2 tablets  (5 mg total) by mouth 2 (two) times daily., Disp: 60 tablet, Rfl: 10    diltiaZEM (CARDIZEM CD) 300 MG 24 hr capsule, TAKE ONE CAPSULE BY MOUTH EVERY DAY, Disp: 90 capsule, Rfl: 11    folic acid-vit B6-vit B12 2.5-25-2 mg (FOLBIC OR EQUIV) 2.5-25-2 mg Tab, Take 1 tablet by mouth once daily., Disp: 90 tablet, Rfl: 4    omeprazole (PRILOSEC) 20 MG capsule, Take 1 capsule (20 mg total) by mouth once daily., Disp: 90 capsule, Rfl: 3    repaglinide (PRANDIN) 1 MG tablet, TAKE ONE TABLET BY MOUTH TWICE DAILY BEFORE MEALS, Disp: 180 tablet, Rfl: 0    simvastatin (ZOCOR) 40 MG tablet, Take 1 tablet (40 mg total) by mouth every evening., Disp: 90 tablet, Rfl: 4    Review of patient's allergies indicates:  No Known Allergies    Social History     Social History    Marital status:      Spouse name: N/A    Number of children: N/A    Years of education: N/A     Occupational History    Not on file.     Social History Main Topics    Smoking status: Never Smoker    Smokeless tobacco: Never Used    Alcohol use Yes      Comment: occassionally    Drug use: No    Sexual activity: Not on file     Other Topics Concern    Not on file     Social History Narrative    He is retired from the [employer] industry. 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            Family History   Problem Relation Age of Onset    No Known Problems Mother     No Known Problems Father     No Known Problems Maternal Grandmother     No Known Problems Maternal Grandfather     Glaucoma Neg Hx     Macular degeneration Neg Hx     Retinal detachment Neg Hx     Cancer Neg Hx     Diabetes Neg Hx     Heart failure Neg Hx      Review of Systems   Constitutional: Negative for fatigue, fever and unexpected weight change.   HENT: Negative for ear discharge, facial swelling, hearing loss, mouth sores, rhinorrhea, sore throat, tinnitus, trouble swallowing and voice change.    Eyes: Negative for pain and visual  disturbance.   Respiratory: Negative for cough and shortness of breath.    Cardiovascular: Negative for chest pain and palpitations.   Gastrointestinal: Negative for abdominal pain, constipation and diarrhea.   Genitourinary: Negative for difficulty urinating and dysuria.   Musculoskeletal: Positive for arthralgias. Negative for back pain and neck pain.   Skin: Negative for color change and rash.   Neurological: Negative for dizziness, seizures and headaches.   Hematological: Positive for adenopathy (left neck mass). Does not bruise/bleed easily.   Psychiatric/Behavioral: Negative for agitation. The patient is not nervous/anxious.        Objective:      Physical Exam   Constitutional: He is oriented to person, place, and time. He appears well-developed and well-nourished. He is cooperative.   HENT:   Head: Normocephalic and atraumatic.       Right Ear: Hearing, tympanic membrane, external ear and ear canal normal.   Left Ear: Hearing, tympanic membrane, external ear and ear canal normal.   Nose: Nose normal. No rhinorrhea, nasal deformity or septal deviation. Right sinus exhibits no maxillary sinus tenderness and no frontal sinus tenderness. Left sinus exhibits no maxillary sinus tenderness and no frontal sinus tenderness.   Mouth/Throat: Uvula is midline, oropharynx is clear and moist and mucous membranes are normal. He does not have dentures. No oral lesions. No trismus in the jaw. No oropharyngeal exudate or posterior oropharyngeal edema.       .   Eyes: Conjunctivae and EOM are normal. Pupils are equal, round, and reactive to light. Right eye exhibits no chemosis. Left eye exhibits no chemosis.   Neck: Trachea normal, normal range of motion and phonation normal. Neck supple. No JVD present. No tracheal tenderness present. No tracheal deviation and no edema present. No thyroid mass and no thyromegaly present.   Salivary glands - there are no lesions, and there is no asymmetry of the parotid and submandibular  glands   Cardiovascular: Normal rate, regular rhythm and intact distal pulses.    Pulmonary/Chest: Effort normal. No accessory muscle usage or stridor. No tachypnea. No respiratory distress.   Abdominal: Normal appearance. He exhibits no distension. There is no guarding.   Lymphadenopathy:     He has no cervical adenopathy.        Right cervical: No deep cervical and no posterior cervical adenopathy present.       Left cervical: No deep cervical and no posterior cervical adenopathy present.        Right: No supraclavicular adenopathy present.        Left: No supraclavicular adenopathy present.   Neurological: He is alert and oriented to person, place, and time. No cranial nerve deficit. Gait normal.   Skin: Skin is warm and dry. No lesion noted.   Psychiatric: He has a normal mood and affect. His speech is normal.   Vitals reviewed.      MRI 7/19/2017:  Impression      Left parotid space solid mass with considerations including benign mixed tumor, Warthin tumor, low-grade adenoid cystic carcinoma, and low-grade mucoid epidermoid carcinoma.  High-grade malignancy is not excluded however less likely given circumscribed, non-infiltrative appearance.  Biopsy or excision is recommended.     CT Neck 6/29/2017:  Impression       1.  Approximately 3 cm, well-circumscribed ovoid soft tissue mass involving the deep lobe of the left parotid gland. Common differential considerations include a benign mixed tumor and a Warthin tumor.    2. Otherwise, no CT findings to explain this patient's symptoms. Chronic sinus disease and evidence of prior paranasal sinus surgery is also noted.     US-FNA 10/5/2017:  FINAL PATHOLOGIC DIAGNOSIS  NO CARCINOMA IDENTIFIED  INNOCUOUS APPEARING LYMPHOID TISSUE WITH SOME INNOCUOUS APPEARING EPITHELIAL  CLUSTERS SEE DESCRIPTION  Diagnosed by: Ej Dwyer M.D.  (Electronically Signed: 2017-10-10 10:54:15)  Microscopic Examination  There is only a relatively small amount of total material for  evaluation in the H&E section. In that specimen as well as  in the aspirate there is innocuous appearing lymphoid tissue. In the aspirate are some clusters of innocuous  appearing epithelial cells. They potentially could be the oncocytic cells seen in a Warthin 's tumor, but they may merely  be some innocuous ductal cells. It might even be possible that they could come from a the epithelial component of the  pleomorphic adenoma. These cells do not appear to reflect carcinoma, however.    Assessment & Plan:       Problem List Items Addressed This Visit     Hyperlipidemia associated with type 2 diabetes mellitus     Continue current meds         Hypertension, well controlled     Continue current meds         GERD (gastroesophageal reflux disease)     Stable. Continue current meds         Type 2 diabetes mellitus with complication     Stable. Continue current meds           Chronic atrial fibrillation     Stay on current meds. Will defer to preop center for management guidelines around Eliquis.          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 The edges on the MRI are a little fuzzy, but the math favors a benign etiology, and the prior I+D could account for this. His FNA was negative, but not 100% confirmatory. Unfortunately, his symptoms have persisted and worsened, and he is more interested in surgery. His daughter inquired as to whether there are medical management options. Because there is a tumor present, it is unclear if the symptoms are related to the mass itself or to the impact of the mass on normal salivary function. It is external to the ductal system, so sialoendoscopy will not be particularly valuable. Other than continuing to manage his symptoms with pain medication or nerve stabilizers (neurontin), as well as massage and sialogogues, there are no other reasonable medical options. I do not support long term steroids for this. I have  therefore recommended surgery, which will likely involve a total parotidectomy. I would recommend an abdominal dermis fat graft for reconstruction of the defect. I would like to get repeat imaging, because if this is cancer or there is evidence of progression or charisma disease, then we would need to alter the surgical plan.     We then discussed the risks, benefits, indications, and alternatives to left parotidectomy.  The risks were noted to include, but not be limited to, infection, bleeding, scarring, partial or total weakness of one or all of the branches of the facial nerve, eye dryness and potential injury with nerve weakness, numbness of the ear and the side of the face, gustatory sweating (Keith's syndrome - which was described as sweating while eating due to cholinergic stimulation of sweat glands in the absence of salivary tissue), first bite syndrome (pain upon initial bite of food after dissection in the parapharyngeal space, tumor spillage, recurrence, collection of blood or tissue fluid, sialolcele formation requiring drainage, and the need for additional procedures.  The benefits in this case will be diagnostic and potentially therapeutic, and the indication is a parotid mass.  The chief alternative, in the absence of a diagnosis, is observation or repeat needle biopsy.  Time was allowed for questions, and all questions were answered to the patient's apparent satisfaction.  Informed consent was obtained.    Given the potential defect size, we also discussed options for managing the post-ablative defect after parotidectomy. Options include alloderm grafting which would assist with minimizing gustatory sweating but provides limited volume, local flaps, and dermis-fat grafting. I recommended dermis-fat grafting from the lower abdomen. I then discussed the risks, benefits, indications, and alternatives to a dermis-fat graft for parotid defect reconstruction. The risks were noted to include, but not be  limited to, infection, bleeding, scarring, resorption, cosmetic deformity, an incision on the lower abdomen, and the need for additional procedures. We will use an existing scar if one is present, otherwise, there will be a new scar on the lower abdomen. Time was allowed for questions, and all questions were answered to the patient's apparent satisfaction. Informed consent was obtained.      He will get his repeat imaging, and then we will proceed with surgery. Paperwork will be signed on the morning of surgery.             Relevant Orders    Basic metabolic panel    CBC auto differential    Case Request Operating Room: PAROTIDECTOMY, PLACEMENT-GRAFT (Completed)      Other Visit Diagnoses     Neoplasm of face        Relevant Orders    MRI Orbit Face Neck With Contrast

## 2018-02-02 ENCOUNTER — TELEPHONE (OUTPATIENT)
Dept: FAMILY MEDICINE | Facility: CLINIC | Age: 78
End: 2018-02-02

## 2018-02-02 NOTE — TELEPHONE ENCOUNTER
----- Message from Nikita Rubio sent at 2/1/2018 12:20 PM CST -----  Contact: Andres with [employer]   Andres with [employer] want to speak with a nurse regarding clarification on if patient should be taking vitamin d . Please call back at [phone]

## 2018-02-03 RX ORDER — VIT C/E/ZN/COPPR/LUTEIN/ZEAXAN 250MG-90MG
1000 CAPSULE ORAL DAILY
Qty: 90 CAPSULE | Refills: 11 | COMMUNITY
Start: 2018-02-03

## 2018-02-03 NOTE — TELEPHONE ENCOUNTER
Unable to reach patient. There are no medical indication of vit d def. Please consider both natural Vit D( midday sunfor 15 mins) and low dose 1,000 iu daily.

## 2018-02-11 NOTE — PATIENT INSTRUCTIONS
Do not eat anything after midnight on the evening of surgery. You can drink clear liquids until 6 hours before your scheduled arrival time.     The team will call the week before (or the day before) surgery to tell you the arrival time.    The anesthesia preop center will call to ask you some questions before surgery.     Please stop aspirin 2 weeks before surgery, plavix 1 week before surgery, and other blood thinners 5 days before surgery, if indicated. Sometimes the anesthesia team or your doctors will want you stay on at least one blood thinner - they will let you know.     For your day of surgery, please come to [facility] on the 2nd floor of the main hospital - follow the signs.

## 2018-02-19 ENCOUNTER — ANESTHESIA EVENT (OUTPATIENT)
Dept: SURGERY | Facility: HOSPITAL | Age: 78
End: 2018-02-19

## 2018-02-19 ENCOUNTER — HOSPITAL ENCOUNTER (OUTPATIENT)
Dept: RADIOLOGY | Facility: HOSPITAL | Age: 78
Discharge: HOME | End: 2018-02-19
Attending: OTOLARYNGOLOGY
Payer: MEDICARE

## 2018-02-19 ENCOUNTER — TELEPHONE (OUTPATIENT)
Dept: FAMILY MEDICINE | Facility: CLINIC | Age: 78
End: 2018-02-19

## 2018-02-19 DIAGNOSIS — D49.89 NEOPLASM OF FACE: ICD-10-CM

## 2018-02-19 PROCEDURE — 25500020 PHARM REV CODE 255: Performed by: OTOLARYNGOLOGY

## 2018-02-19 PROCEDURE — A9585 GADOBUTROL INJECTION: HCPCS | Performed by: OTOLARYNGOLOGY

## 2018-02-19 PROCEDURE — 70543 MRI ORBT/FAC/NCK W/O &W/DYE: CPT | Mod: TC

## 2018-02-19 PROCEDURE — 70543 MRI ORBT/FAC/NCK W/O &W/DYE: CPT | Mod: 26,,, | Performed by: RADIOLOGY

## 2018-02-19 RX ORDER — GADOBUTROL 604.72 MG/ML
INJECTION INTRAVENOUS
Status: DISPENSED
Start: 2018-02-19 | End: 2018-02-19

## 2018-02-19 RX ORDER — GADOBUTROL 604.72 MG/ML
9 INJECTION INTRAVENOUS
Status: COMPLETED | OUTPATIENT
Start: 2018-02-19 | End: 2018-02-19

## 2018-02-19 RX ADMIN — GADOBUTROL 9 ML: 604.72 INJECTION INTRAVENOUS at 09:02

## 2018-02-19 NOTE — ANESTHESIA PREPROCEDURE EVALUATION
Anesthesia Assessment: Preoperative EQUATION     Planned Procedure: Procedure(s) (LRB):  PAROTIDECTOMY (Left)  PLACEMENT-GRAFT (Left)  Requested Anesthesia Type:General  Surgeon: Sam oB MD  Service: ENT  Known or anticipated Date of Surgery:3/5/2018     Surgeon notes: reviewed     Electronic QUestionnaire Assessment completed via nurse interview with patient.      NO AQ     Triage considerations:      The patient has no apparent active cardiac condition (No unstable coronary Syndrome such as severe unstable angina or recent [<1 month] myocardial infarction, decompensated CHF, severe valvular   disease or significant arrhythmia)     Previous anesthesia records:[facility]  08/26/2013 0933   Method of Intubation: Direct laryngoscopy; Inserted by: Anesthesia Resident; Airway Device: Endotracheal Tube; Mask Ventilation: Easy - oral; Blade: Curry #2; Airway Device Size: 8.0; Style: Cuffed; Cuff Inflation: Minimal occlusive pressure; Placement Verified By: Auscultation, Capnometry; Grade: Grade II; Complicating Factors: None; Intubation Findings: Positive EtCO2, Bilateral breath sounds, Atraumatic/Condition of teeth unchanged;  Depth of Insertion: 22; Securment: Lips; Complications: None; Breath Sounds: Equal Bilateral; Insertion Attempts: 1  Anesthesia Hx:  No problems with previous Anesthesia  History of prior surgery of interest to airway management or planning: Denies Family Hx of Anesthesia complications.   Denies Personal Hx of Anesthesia complications  Airway/Jaw/Neck:  Airway Findings: Mouth Opening: Small, but > 3cm Tongue: Normal  General Airway Assessment: Adult  Mallampati: III  TM Distance: Normal, at least 6 cm  Jaw/Neck Findings:  Neck ROM: Extension Decreased, Mod.        Last PCP note: within 3 months , within [facility]   1/5/18     Other important co-morbidities: HTN, HLD, GERD, Chronic A Fib, DM-2, Peripheral neuropathy     Tests already available:  Available tests,  within 3 months , within [facility]  .  2/19/18  CBC, CMP, MRI  1/2018 TSH, Folate , B-12   12/29/17 A1C  7/2016 EKG                            Instructions given. (See in Nurse's note)     Optimization:  Anesthesia Preop Clinic Assessment  Indicated-no problems with previous anesthesia    Medical Opinion Indicated-PCP                             Plan:    Testing:  none needed                           Consultation:Patient's PCP for a statement of optimization  PCP    Isiah Crespo MD     Encounter Date: 2/19/2018  [facility] clear for surgery, low risk for cardiac event. Hold Eliquis 5 days before. Notify him to hold Diltiazem and diabetes medication the day of the surgery.  Labs reviewed.                            Patient  has previously scheduled Medical Appointment:  none     Navigation: Tests Scheduled. none                        Straight Line to surgery.       Cynthia Sampson RN                         Electronically signed by Cynthia Sampson RN at 2/19/2018  1:18 PM                                                                                                                  02/19/2018  Domitila Walters is a 77 y.o., male.    Pre-op Assessment         Review of Systems  Cardiovascular:   Hypertension  Disorder of Cardiac Rhythm, Atrial Fibrillation, Chronic Atrial Fibrillation    Hepatic/GI:  Esophageal / Stomach Disorders Gerd Controlled by chronic antireflux medication.  Hernia, Hiatal Hernia   Endocrine:  Diabetes, Type 2 Diabetes , controlled by oral hypoglycemics. , most recent HgA1c value was 6.3 on 12/29/17.

## 2018-02-19 NOTE — TELEPHONE ENCOUNTER
----- Message from Cynthia Sampson RN sent at 2/19/2018  1:25 PM CST -----  Regarding: Pre-op optimization, Eliquis orders  Patient is scheduled for surgery on 3/5/18 for a Parotidectomy, graft placement with Dr Bo. Patient is in need of a pre-op Risk Assessment/Clearance and Eliquis orders.  How many days prior to surgery may patient hold Eliquis?    Your help is greatly appreciated.  Thank you,  IRAM Marie  Pre-Op Center Anesthesia  [phone]

## 2018-02-20 ENCOUNTER — PATIENT MESSAGE (OUTPATIENT)
Dept: OTOLARYNGOLOGY | Facility: CLINIC | Age: 78
End: 2018-02-20

## 2018-02-20 NOTE — TELEPHONE ENCOUNTER
Medical clear for surgery, low risk for cardiac event. Hold Eliquis 5 days before. Notify him to hold Diltiazem and diabetes medication the day of the surgery.  Labs reviewed.   Lab Results   Component Value Date    WBC 7.30 02/19/2018    HGB 15.5 02/19/2018    HCT 47.5 02/19/2018    MCV 95 02/19/2018     02/19/2018     BMP  Lab Results   Component Value Date     02/19/2018    K 4.3 02/19/2018     02/19/2018    CO2 26 02/19/2018    BUN 13 02/19/2018    CREATININE 1.1 02/19/2018    CALCIUM 9.1 02/19/2018    ANIONGAP 10 02/19/2018    ESTGFRAFRICA >60 02/19/2018    EGFRNONAA >60 02/19/2018     Advised to call back directly if there are further questions,

## 2018-03-05 ENCOUNTER — ANESTHESIA (OUTPATIENT)
Dept: SURGERY | Facility: HOSPITAL | Age: 78
End: 2018-03-05

## 2018-05-24 DIAGNOSIS — E11.59 CONTROLLED TYPE 2 DIABETES MELLITUS WITH OTHER CIRCULATORY COMPLICATION, WITHOUT LONG-TERM CURRENT USE OF INSULIN: ICD-10-CM

## 2018-05-25 RX ORDER — REPAGLINIDE 1 MG/1
TABLET ORAL
Qty: 180 TABLET | Refills: 3 | Status: SHIPPED | OUTPATIENT
Start: 2018-05-25 | End: 2019-04-25 | Stop reason: SDUPTHER

## 2018-05-30 DIAGNOSIS — E11.8 TYPE 2 DIABETES MELLITUS WITH COMPLICATION, WITHOUT LONG-TERM CURRENT USE OF INSULIN: Primary | ICD-10-CM

## 2018-05-31 ENCOUNTER — LAB VISIT (OUTPATIENT)
Dept: LAB | Facility: HOSPITAL | Age: 78
End: 2018-05-31
Attending: FAMILY MEDICINE
Payer: MEDICARE

## 2018-05-31 ENCOUNTER — OFFICE VISIT (OUTPATIENT)
Dept: FAMILY MEDICINE | Facility: CLINIC | Age: 78
End: 2018-05-31
Payer: MEDICARE

## 2018-05-31 ENCOUNTER — CLINICAL SUPPORT (OUTPATIENT)
Dept: FAMILY MEDICINE | Facility: CLINIC | Age: 78
End: 2018-05-31
Attending: FAMILY MEDICINE
Payer: MEDICARE

## 2018-05-31 VITALS
HEIGHT: 73 IN | BODY MASS INDEX: 28.05 KG/M2 | SYSTOLIC BLOOD PRESSURE: 118 MMHG | DIASTOLIC BLOOD PRESSURE: 60 MMHG | WEIGHT: 211.63 LBS | TEMPERATURE: 98 F | HEART RATE: 66 BPM

## 2018-05-31 DIAGNOSIS — E11.8 TYPE 2 DIABETES MELLITUS WITH COMPLICATION, WITHOUT LONG-TERM CURRENT USE OF INSULIN: ICD-10-CM

## 2018-05-31 DIAGNOSIS — I10 HYPERTENSION, WELL CONTROLLED: ICD-10-CM

## 2018-05-31 DIAGNOSIS — I48.20 CHRONIC ATRIAL FIBRILLATION: ICD-10-CM

## 2018-05-31 DIAGNOSIS — K21.9 GASTROESOPHAGEAL REFLUX DISEASE WITHOUT ESOPHAGITIS: ICD-10-CM

## 2018-05-31 DIAGNOSIS — R55 ARRHYTHMIA, VAGAL: ICD-10-CM

## 2018-05-31 DIAGNOSIS — I10 HYPERTENSION, WELL CONTROLLED: Primary | ICD-10-CM

## 2018-05-31 LAB
ALBUMIN SERPL BCP-MCNC: 4 G/DL
ALP SERPL-CCNC: 70 U/L
ALT SERPL W/O P-5'-P-CCNC: 55 U/L
ANION GAP SERPL CALC-SCNC: 8 MMOL/L
ANION GAP SERPL CALC-SCNC: 8 MMOL/L
AST SERPL-CCNC: 34 U/L
BASOPHILS # BLD AUTO: 0.04 K/UL
BASOPHILS NFR BLD: 0.5 %
BILIRUB SERPL-MCNC: 0.8 MG/DL
BUN SERPL-MCNC: 14 MG/DL
BUN SERPL-MCNC: 14 MG/DL
CALCIUM SERPL-MCNC: 9.2 MG/DL
CALCIUM SERPL-MCNC: 9.2 MG/DL
CHLORIDE SERPL-SCNC: 106 MMOL/L
CHLORIDE SERPL-SCNC: 106 MMOL/L
CO2 SERPL-SCNC: 27 MMOL/L
CO2 SERPL-SCNC: 27 MMOL/L
CREAT SERPL-MCNC: 1 MG/DL
CREAT SERPL-MCNC: 1 MG/DL
DIFFERENTIAL METHOD: ABNORMAL
EOSINOPHIL # BLD AUTO: 0.4 K/UL
EOSINOPHIL NFR BLD: 5.8 %
ERYTHROCYTE [DISTWIDTH] IN BLOOD BY AUTOMATED COUNT: 13.3 %
EST. GFR  (AFRICAN AMERICAN): >60 ML/MIN/1.73 M^2
EST. GFR  (AFRICAN AMERICAN): >60 ML/MIN/1.73 M^2
EST. GFR  (NON AFRICAN AMERICAN): >60 ML/MIN/1.73 M^2
EST. GFR  (NON AFRICAN AMERICAN): >60 ML/MIN/1.73 M^2
ESTIMATED AVG GLUCOSE: 140 MG/DL
GLUCOSE SERPL-MCNC: 92 MG/DL
GLUCOSE SERPL-MCNC: 92 MG/DL
HBA1C MFR BLD HPLC: 6.5 %
HCT VFR BLD AUTO: 48.7 %
HGB BLD-MCNC: 15.8 G/DL
IMM GRANULOCYTES # BLD AUTO: 0.01 K/UL
IMM GRANULOCYTES NFR BLD AUTO: 0.1 %
LYMPHOCYTES # BLD AUTO: 2.6 K/UL
LYMPHOCYTES NFR BLD: 33.4 %
MAGNESIUM SERPL-MCNC: 2 MG/DL
MCH RBC QN AUTO: 31.8 PG
MCHC RBC AUTO-ENTMCNC: 32.4 G/DL
MCV RBC AUTO: 98 FL
MONOCYTES # BLD AUTO: 0.6 K/UL
MONOCYTES NFR BLD: 8.4 %
NEUTROPHILS # BLD AUTO: 4 K/UL
NEUTROPHILS NFR BLD: 51.8 %
NRBC BLD-RTO: 0 /100 WBC
PLATELET # BLD AUTO: 238 K/UL
PMV BLD AUTO: 10.7 FL
POTASSIUM SERPL-SCNC: 4.3 MMOL/L
POTASSIUM SERPL-SCNC: 4.3 MMOL/L
PROT SERPL-MCNC: 7.2 G/DL
RBC # BLD AUTO: 4.97 M/UL
SODIUM SERPL-SCNC: 141 MMOL/L
SODIUM SERPL-SCNC: 141 MMOL/L
TSH SERPL DL<=0.005 MIU/L-ACNC: 1.46 UIU/ML
WBC # BLD AUTO: 7.64 K/UL

## 2018-05-31 PROCEDURE — 83036 HEMOGLOBIN GLYCOSYLATED A1C: CPT

## 2018-05-31 PROCEDURE — 80053 COMPREHEN METABOLIC PANEL: CPT

## 2018-05-31 PROCEDURE — 99213 OFFICE O/P EST LOW 20 MIN: CPT | Mod: PBBFAC,25,PO | Performed by: FAMILY MEDICINE

## 2018-05-31 PROCEDURE — 92250 FUNDUS PHOTOGRAPHY W/I&R: CPT | Mod: 50,PBBFAC,PO

## 2018-05-31 PROCEDURE — 83735 ASSAY OF MAGNESIUM: CPT

## 2018-05-31 PROCEDURE — 93010 ELECTROCARDIOGRAM REPORT: CPT | Mod: S$PBB,,, | Performed by: INTERNAL MEDICINE

## 2018-05-31 PROCEDURE — 99999 PR PBB SHADOW E&M-EST. PATIENT-LVL III: CPT | Mod: PBBFAC,,, | Performed by: FAMILY MEDICINE

## 2018-05-31 PROCEDURE — 93005 ELECTROCARDIOGRAM TRACING: CPT | Mod: PBBFAC,PO | Performed by: INTERNAL MEDICINE

## 2018-05-31 PROCEDURE — 85025 COMPLETE CBC W/AUTO DIFF WBC: CPT

## 2018-05-31 PROCEDURE — 84443 ASSAY THYROID STIM HORMONE: CPT

## 2018-05-31 PROCEDURE — 99214 OFFICE O/P EST MOD 30 MIN: CPT | Mod: S$PBB,,, | Performed by: FAMILY MEDICINE

## 2018-05-31 PROCEDURE — 36415 COLL VENOUS BLD VENIPUNCTURE: CPT | Mod: PO

## 2018-05-31 NOTE — PROGRESS NOTES
Domitila Walters is a 78 y.o. male here for a diabetic eye screening with non-dilated fundus photos per Dr Isiah Crespo.    Patient cooperative?: Yes  Small pupils?: No & Yes  Last eye exam: over a year ago.    For exam results, see Encounter Report.

## 2018-05-31 NOTE — Clinical Note
No diabetic retinopathy in image of right eye. No image of left eye to grade. Recommend dilated fundus exam with primary eye care when convenient.

## 2018-05-31 NOTE — PATIENT INSTRUCTIONS
Established High Blood Pressure    High blood pressure (hypertension) is a chronic disease. Often, healthcare providers dont know what causes it. But it can be caused by certain health conditions and medicines.  If you have high blood pressure, you may not have any symptoms. If you do have symptoms, they may include headache, dizziness, changes in your vision, chest pain, and shortness of breath. But even without symptoms, high blood pressure thats not treated raises your risk for heart attack and stroke. High blood pressure is a serious health risk and shouldnt be ignored.  A blood pressure reading is made up of two numbers: a higher number over a lower number. The top number is the systolic pressure. The bottom number is the diastolic pressure. A normal blood pressure is a systolic pressure of  less than 120 over a diastolic pressure of less than 80. You will see your blood pressure readings written together. For example, a person with a systolic pressure of 188 and a diastolic pressure of 78 will have 118/78 written in the medical record.  High blood pressure is when either the top number is 140 or higher, or the bottom number is 90 or higher. This must be the result when taking your blood pressure a number of times. The blood pressures between normal and high are called prehypertension.  Home care  If you have high blood pressure, you should do what is listed below to lower your blood pressure. If you are taking medicines for high blood pressure, these methods may reduce or end your need for medicines in the future.  · Begin a weight-loss program if you are overweight.  · Cut back on how much salt you get in your diet. Heres how to do this:  ¨ Dont eat foods that have a lot of salt. These include olives, pickles, smoked meats, and salted potato chips.  ¨ Dont add salt to your food at the table.  ¨ Use only small amounts of salt when cooking.  · Start an exercise program. Talk with your healthcare  provider about the type of exercise program that would be best for you. It doesn't have to be hard. Even brisk walking for 20 minutes 3 times a week is a good form of exercise.  · Dont take medicines that stimulate the heart. This includes many over-the-counter cold and sinus decongestant pills and sprays, as well as diet pills. Check the warnings about hypertension on the label. Before buying any over-the-counter medicines or supplements, always ask the pharmacist about the product's potential interaction with your high blood pressure and your high blood pressure medicines.  · Stimulants such as amphetamine or cocaine could be deadly for someone with high blood pressure. Never take these.  · Limit how much caffeine you get in your diet. Switch to caffeine-free products.  · Stop smoking. If you are a long-time smoker, this can be hard. Talk to your healthcare provider about medicines and nicotine replacement options to help you. Also, enroll in a stop-smoking program to make it more likely that you will quit for good.  · Learn how to handle stress. This is an important part of any program to lower blood pressure. Learn about relaxation methods like meditation, yoga, or biofeedback.  · If your provider prescribed medicines, take them exactly as directed. Missing doses may cause your blood pressure get out of control.  · If you miss a dose or doses, check with your healthcare provider or pharmacist about what to do.  · Consider buying an automatic blood pressure machine. Ask your provider for a recommendation. You can get one of these at most pharmacies.     The American Heart Association recommends the following guidelines for home blood pressure monitoring:  · Don't smoke or drink coffee for 30 minutes before taking your blood pressure.  · Go to the bathroom before the test.  · Relax for 5 minutes before taking the measurement.  · Sit with your back supported (don't sit on a couch or soft chair); keep your feet on  the floor uncrossed. Place your arm on a solid flat surface (like a table) with the upper part of the arm at heart level. Place the middle of the cuff directly above the eye of the elbow. Check the monitor's instruction manual for an illustration.  · Take multiple readings. When you measure, take 2 to 3 readings one minute apart and record all of the results.  · Take your blood pressure at the same time every day, or as your healthcare provider recommends.  · Record the date, time, and blood pressure reading.  · Take the record with you to your next medical appointment. If your blood pressure monitor has a built-in memory, simply take the monitor with you to your next appointment.  · Call your provider if you have several high readings. Don't be frightened by a single high blood pressure reading, but if you get several high readings, check in with your healthcare provider.  · Note: When blood pressure reaches a systolic (top number) of 180 or higher OR diastolic (bottom number) of 110 or higher, seek emergency medical treatment.  Follow-up care  You will need to see your healthcare provider regularly. This is to check your blood pressure and to make changes to your medicines. Make a follow-up appointment as directed. Bring the record of your home blood pressure readings to the appointment.  When to seek medical advice  Call your healthcare provider right away if any of these occur:  · Blood pressure reaches a systolic (upper number) of 180 or higher OR a diastolic (bottom number) of 110 or higher  · Chest pain or shortness of breath  · Severe headache  · Throbbing or rushing sound in the ears  · Nosebleed  · Sudden severe pain in your belly (abdomen)  · Extreme drowsiness, confusion, or fainting  · Dizziness or spinning sensation (vertigo)  · Weakness of an arm or leg or one side of the face  · You have problems speaking or seeing   Date Last Reviewed: 12/1/2016  © 6799-0055 [employer]. [address] [place] [address]  [facility], [place] [address]. All rights reserved. This information is not intended as a substitute for professional medical care. Always follow your healthcare professional's instructions.        Diabetes (General Information)  Diabetes is a long-term health problem. It means your body does not make enough insulin. Or it may mean that your body cannot use the insulin it makes. Insulin is a hormone in your body. It lets blood sugar (glucose) reach the cells in your body. All of your cells need glucose for fuel.  When you have diabetes, the glucose in your blood builds up because it cannot get into the cells. This buildup is called high blood sugar (hyperglycemia).  Your blood sugar level depends on several things. It depends on what kind of food you eat and how much of it you eat. It also depends on how much exercise you get, and how much insulin you have in your body. Eating too much of the wrong kinds of food or not taking diabetes medicine on time can cause high blood sugar. Infections can cause high blood sugar even if you are taking medicines correctly.  These things can also cause low blood sugar:  · Missing meals  · Not eating enough food  · Taking too much diabetes medicine  Diabetes can cause serious problems over time if you do not get treated. These problems include heart disease, stroke, kidney failure, and blindness. They also include nerve pain or loss of feeling in your legs and feet, and gangrene of the feet. By keeping your blood sugar under control you can prevent or delay these problems.  Normal blood sugar levels are 80 to 100 before a meal and less than 180 in the 1 to 2 hours after a meal.  Home care  Follow these guidelines when caring for yourself at home:  · Follow the diet your healthcare provider gives you. Take insulin or other diabetes medicine exactly as told to.  · Watch your blood sugar as you are told to. Keep a log of your results. This will help your provider change your medicines  to keep your blood sugar under control.  · Try to reach your ideal weight. You may be able to cut back on or not have to take diabetes medicine if you eat the right foods and get exercise.  · Do not smoke. Smoking worsens the effects of diabetes on your circulation. You are much more likely to have a heart attack if you have diabetes and you smoke.  · Take good care of your feet. If you have lost feeling in your feet, you may not see an injury or infection. Check your feet and between your toes at least once a week.  · Wear a medical alert bracelet or necklace, or carry a card in your wallet that says you have diabetes. This will help healthcare providers give you the right care if you get very ill and cannot tell them that you have diabetes.  Sick day plan  If you get a cold, the flu, or a bacterial or viral infection, take these steps:  · Look at your diabetes sick plan and call your healthcare provider as you were told to. You may need to call your provider right away if:  ¨ Your blood sugar is above 240 while taking your diabetes medicine  ¨ Your urine ketone levels are above normal or high  ¨ You have been vomiting more than 6 hours  ¨ You have trouble breathing or your breath ha s a fruity smell  ¨ You have a high fever  ¨ You have a fever for several days and you are not getting better  ¨ You get light-headed and are sleepier than usual  · Keep taking your diabetes pills (oral medicine) even if you have been vomiting and are feeling sick. Call your provider right away because you may need insulin to lower your blood sugar until you recover from your illness.  · Keep taking your insulin even if you have been vomiting and are feeling sick. Call your provider right away to ask if you need to change your insulin dose. This will depend on your blood sugar results.  · Check your blood sugar every 2 to 4 hours, or at least 4 times a day.  · Check your ketones often. If you are vomiting and having diarrhea, watch  them more often.  · Do not skip meals. Try to eat small meals on a regular schedule. Do this even if you do not feel like eating.  · Drink water or other liquids that do not have caffeine or calories. This will keep you from getting dehydrated. If you are nauseated or vomiting, takes small sips every 5 minutes. To prevent dehydration try to drink a cup (8 ounces) of fluids every hour while you are awake.  General care  Always bring a source of fast-acting sugar with you in case you have symptoms of low blood sugar (below 70). At the first sign of low blood sugar, eat or drink 15 to 20 grams of fast-acting sugar to raise your blood sugar. Examples are:  · 3 to 4 glucose tablets. You can buy these at most drugstores.  · 4 ounces (1/2 cup) of regular (not diet) soft drinks  · 4 ounces (1/2 cup) of any fruit juice  · 8 ounces (1 cup) of milk  · 5 to 6 pieces of hard candy  · 1 tablespoon of honey  Check your blood sugar 15 minutes after treating yourself. If it is still below 70, take 15 to 20 more grams of fast-acting sugar. Test again in 15 minutes. If it returns to normal (70 or above), eat a snack or meal to keep your blood sugar in a safe range. If it stays low, call your doctor or go to an emergency room.  Follow-up care  Follow-up with your healthcare provider, or as advised. For more information about diabetes, visit the American Diabetes Association website at www.diabetes.org or call 191-292-7324.  When to seek medical advice  Call your healthcare provider right away if you have any of these symptoms of high blood sugar:  · Frequent urination  · Dizziness  · Drowsiness  · Thirst  · Headache  · Nausea or vomiting  · Abdominal pain  · Eyesight changes  · Fast breathing  · Confusion or loss of consciousness  Also call your provider right away if you have any of these signs of low blood sugar:  · Fatigue  · Headache  · Shakes  · Excess sweating  · Hunger  · Feeling anxious or restless  · Eyesight  changes  · Drowsiness  · Weakness  · Confusion or loss of consciousness  Call 911  Call for emergency help right away if any of these occur:  · Chest pain or shortness of breath  · Dizziness or fainting  · Weakness of an arm or leg or one side of the face  · Trouble speaking or seeing   Date Last Reviewed: 6/1/2016 © 2000-2017 [employer]. [address] [place] [address], [place] [address]. All rights reserved. This information is not intended as a substitute for professional medical care. Always follow your healthcare professional's instructions.        Advance Medical Directive    An advance medical directive is a form that lets you plan ahead for the care youd want if you could no longer express your wishes. This statement outlines the medical treatment youd want or names the person youd wish to make healthcare decisions for you. Be aware that laws vary from state to state, and it may be worthwhile to talk with an .  Writing down your wishes  · An advance directive is important whether youre young or old. Injury or illness can strike at any age.  · Decide what is important to you and the kind of treatment youd want, or not want to have.  · Some states allow only one kind of advance directive. Some let you do both a Durable Power of  for Health Care and a Living Will. Some states put both kinds on the same form.  A Durable Power of  for Health Care  · This form lets you name someone else to be your agent.  · This person can decide on treatment for you only when you cant speak for yourself.  · You do not need to be at the end of your life. He or she could speak for you if you were in a coma but were likely to recover.  A Living Will  · This form lets you list the care you want at the end of your life.  · A living will applies only if you wont live without medical treatment. It would apply if you had advanced cancer, a massive stroke, or other serious illness from which you will  not recover.  · It takes effect only when you can no longer express your wishes yourself.  Date Last Reviewed: 2/13/2016  © 4080-0308 [employer]. [address] [place] [address]. All rights reserved. This information is not intended as a substitute for professional medical care. Always follow your healthcare professional's instructions.        Choosing an Agent    A durable power of  for health care is only as good as the person you name to be your agent. If this person knows your treatment wishes and is willing to carry them out, youll probably be well-represented. Be sure to tell your agent whats important to you.  Who to choose  Here are suggestions for choosing an agent:  · You can name a family member, close friend, , , or rabbi.  · You should name one person as your agent. Then name one or two alternates. You need a backup person in case your first choice cant be reached when needed.  · Talk to each person you are thinking of naming as your agent or alternate. Do this before you decide who should carry out your wishes.  Your agent should be...  · A competent adult, 18 years or older.  · Someone you trust and can talk to about the care you want and what is important to you.  · Someone who supports your treatment choices.  In many states, your agent cant be...  · Your healthcare provider.  · An employee of your healthcare provider or of a hospital, nursing home, or hospice program where you receive care.  Some states list other restrictions about who can be named as an agent for an advance directive.  Tip: It's a good idea to write down your wishes and give a copy to your agent.   Date Last Reviewed: 2/14/2016  © 2380-4700 [employer]. [address] [place] [address]. All rights reserved. This information is not intended as a substitute for professional medical care. Always follow your healthcare professional's instructions.        An  Agents Role for Durable Power of     Its impossible to know which medical treatment choices you might face in the future. What if you aren't able to make these decisions for yourself? A durable power of  for health care lets you name an agent to carry out your wishes. This happens only if you cant express your wishes yourself.  An agents duty  Your agent respects your wishes in the following ways:   · Your agents duty is to see that your wishes are followed.  · If your wishes arent known, your agent should try to decide what you want.  · Your agents choices come before anyone elses wishes for you.  · A durable power of  for health care does not give your agent control over your money. Your agent also cant be made to pay your bills.  Find out what your agent can do  Restrictions on what an agent can and cant do vary by state. Check your state laws. In most states your agent can:  · Choose or refuse life-sustaining and other medical treatment on your behalf.  · Consent to and then stop treatment if your condition doesnt improve.  · Access and release your medical records.  · Request an autopsy and donate your organs, unless youve stated otherwise on your advance directive.  Find out whether your state allows your agent to do the following:  · Refuse or withdraw life-enhancing care.  · Refuse or stop tube feeding or other life-sustaining care--even if you havent stated on your advance directive that you dont want these treatments.  · Order sterilization or .  Date Last Reviewed: 2016  © 9390-3495 [employer]. [address] [place] [address], [place] [address]. All rights reserved. This information is not intended as a substitute for professional medical care. Always follow your healthcare professional's instructions.        Life Support    If you understand how specific treatments may affect your quality of life, you can decide which ones youd choose or refuse.  You may want to talk to your healthcare provider about the possible benefits and risks of treatments. The chance of good results from these therapies varies based on each individual clinical situation and can be very difficult to predict. Medical treatment, if your life is in danger, falls into three main categories.  Life supporting  · This care keeps your heart and lungs going when they can no longer work on their own.  · CPR restarts your heart and lungs if they stop working.  · A respirator (or ventilator) keeps you breathing. Air is pumped into your lungs through a tube thats put into your windpipe.  Life sustaining  · This care keeps you alive longer when you have an illness that cant be cured.  · Tube feeding or TPN (total parenteral nutrition) provides food and fluids through a tube or IV. It is given if you cant chew or swallow on your own.  · Dialysis is a kidney machine that cleans your blood when your kidneys can no longer work on their own.  Life enhancing  · This care controls pain and discomfort, such as nausea or difficulty breathing. This type of care is not designed to prolong your life, but to enhance quality of life. Nothing is done to keep you alive longer.  · Hospice care is comfort care. It might provide food and fluids by mouth or help with bathing. Hospice care is given during the last stages of a terminal illness.  · Strong pain medicine can be given to help keep you comfortable.  Do Not Resuscitate  Would you want CPR if your heart stops while youre a patient in a hospital or nursing home? If not, talk to your healthcare provider about issuing a DNR (Do-Not-Resuscitate) order.  Be aware  DNRs and advance directives may not apply during anesthesia, in emergency rooms, or when emergency medical teams respond to a 911 call. Ask your healthcare provider how you can make sure your wishes will be followed. Also, a DNR will not prevent you from getting other kinds of needed medical care such  as treatment for pain, or bleeding.   Date Last Reviewed: 2/26/2016  © 7328-6646 [employer]. [address] [place] [address], [place] [address]. All rights reserved. This information is not intended as a substitute for professional medical care. Always follow your healthcare professional's instructions.

## 2018-06-04 ENCOUNTER — HOSPITAL ENCOUNTER (OUTPATIENT)
Dept: CARDIOLOGY | Facility: HOSPITAL | Age: 78
Discharge: HOME | End: 2018-06-04
Attending: FAMILY MEDICINE
Payer: MEDICARE

## 2018-06-04 DIAGNOSIS — R55 ARRHYTHMIA, VAGAL: ICD-10-CM

## 2018-06-04 DIAGNOSIS — I48.20 CHRONIC ATRIAL FIBRILLATION: ICD-10-CM

## 2018-06-04 PROCEDURE — 93227 XTRNL ECG REC<48 HR R&I: CPT | Mod: ,,, | Performed by: INTERNAL MEDICINE

## 2018-06-04 PROCEDURE — 92250 FUNDUS PHOTOGRAPHY W/I&R: CPT | Mod: 26,S$PBB,, | Performed by: OPTOMETRIST

## 2018-06-04 PROCEDURE — 93226 XTRNL ECG REC<48 HR SCAN A/R: CPT

## 2018-06-08 NOTE — PROGRESS NOTES
Subjective:       Patient ID: Domitila Walters is a 78 y.o. male.    Chief Complaint: Diabetes and Hypertension    Diabetes   He presents for his follow-up diabetic visit. He has type 2 diabetes mellitus. His disease course has been improving. There are no hypoglycemic associated symptoms. Pertinent negatives for hypoglycemia include no confusion, dizziness, headaches, pallor, speech difficulty or tremors. Associated symptoms include foot paresthesias. Pertinent negatives for diabetes include no chest pain, no fatigue, no polydipsia, no polyphagia and no polyuria. There are no hypoglycemic complications. Diabetic complications include heart disease. Risk factors for coronary artery disease include diabetes mellitus, dyslipidemia, male sex, obesity and sedentary lifestyle. He is following a generally healthy diet. His breakfast blood glucose range is generally 130-140 mg/dl.   Hypertension   Pertinent negatives include no chest pain, headaches, neck pain, palpitations or shortness of breath.     Review of Systems   Constitutional: Negative.  Negative for activity change, appetite change, chills, diaphoresis, fatigue, fever and unexpected weight change.   HENT: Negative.  Negative for congestion, drooling, ear discharge, ear pain, hearing loss, mouth sores, nosebleeds, postnasal drip, rhinorrhea, sinus pressure, sore throat, tinnitus, trouble swallowing and voice change.    Eyes: Negative.  Negative for pain, discharge, redness, itching and visual disturbance.   Respiratory: Negative.  Negative for apnea, cough, choking, chest tightness and shortness of breath.    Cardiovascular: Negative.  Negative for chest pain, palpitations and leg swelling.   Gastrointestinal: Negative.  Negative for abdominal distention, abdominal pain and anal bleeding.   Endocrine: Negative.  Negative for cold intolerance, heat intolerance, polydipsia, polyphagia and polyuria.   Genitourinary: Negative.  Negative for difficulty urinating,  dysuria, enuresis, flank pain, frequency, hematuria, scrotal swelling, testicular pain and urgency.   Musculoskeletal: Negative.  Negative for arthralgias, back pain, gait problem, neck pain and neck stiffness.   Skin: Negative.  Negative for color change, pallor and rash.   Allergic/Immunologic: Negative.  Negative for environmental allergies and food allergies.   Neurological: Negative.  Negative for dizziness, tremors, syncope, facial asymmetry, speech difficulty, light-headedness, numbness and headaches.   Hematological: Negative for adenopathy. Does not bruise/bleed easily.   Psychiatric/Behavioral: Negative.  Negative for agitation, behavioral problems, confusion, decreased concentration, dysphoric mood and hallucinations. The patient is not hyperactive.        Objective:      Physical Exam   Constitutional: He is oriented to person, place, and time. He appears well-developed and well-nourished. No distress.   HENT:   Head: Normocephalic and atraumatic.   Right Ear: External ear normal.   Left Ear: External ear normal.   Nose: Nose normal.   Mouth/Throat: Oropharynx is clear and moist. No oropharyngeal exudate.   Eyes: Conjunctivae and EOM are normal. Pupils are equal, round, and reactive to light. Right eye exhibits no discharge. Left eye exhibits no discharge. No scleral icterus.   Neck: Normal range of motion. Neck supple. No JVD present. No tracheal deviation present. No thyromegaly present.   Cardiovascular: Normal rate, normal heart sounds and intact distal pulses.    No murmur heard.  Pulses:       Dorsalis pedis pulses are 3+ on the right side, and 3+ on the left side.        Posterior tibial pulses are 3+ on the right side, and 3+ on the left side.   Pulmonary/Chest: Effort normal and breath sounds normal. No respiratory distress. He has no wheezes. He has no rales.   Abdominal: Soft. Bowel sounds are normal. He exhibits no distension and no mass. There is no tenderness. There is no rebound and no  guarding.   Musculoskeletal: He exhibits no edema or tenderness.        Right foot: There is normal range of motion and no deformity.          Feet:   Right Foot:   Protective Sensation: 6 sites tested. 6 sites sensed.   Skin Integrity: Negative for ulcer.   Left Foot:   Protective Sensation: 6 sites tested. 6 sites sensed.   Skin Integrity: Negative for ulcer or blister.   Lymphadenopathy:     He has no cervical adenopathy.   Neurological: He is alert and oriented to person, place, and time. No cranial nerve deficit. Coordination normal.   Skin: Skin is warm and dry. Rash noted. He is not diaphoretic. There is erythema. No pallor.   Psychiatric: He has a normal mood and affect. His behavior is normal. Judgment and thought content normal.   Vitals reviewed.      Assessment:       1. Hypertension, well controlled    2. Chronic atrial fibrillation    3. Type 2 diabetes mellitus with complication, without long-term current use of insulin    4. Gastroesophageal reflux disease without esophagitis    5. Arrhythmia, vagal        Plan:       Hypertension, well controlled  -     Basic metabolic panel; Future; Expected date: 05/31/2018  -     CBC auto differential; Future; Expected date: 05/31/2018  -     Hemoglobin A1c; Future; Expected date: 05/31/2018  -     MICROALBUMIN / CREATININE RATIO URINE; Future; Expected date: 05/31/2018    Chronic atrial fibrillation  -     Magnesium; Future; Expected date: 05/31/2018  -     Comprehensive metabolic panel; Future; Expected date: 05/31/2018  -     TSH; Future; Expected date: 05/31/2018  -     IN OFFICE EKG 12-LEAD (to [facility])  -     Holter monitor - 24 hour (CUPID ONLY-[facility], [place]); Future    Type 2 diabetes mellitus with complication, without long-term current use of insulin  -     Basic metabolic panel; Future; Expected date: 05/31/2018  -     CBC auto differential; Future; Expected date: 05/31/2018  -     Hemoglobin A1c; Future; Expected date: 05/31/2018  -      MICROALBUMIN / CREATININE RATIO URINE; Future; Expected date: 05/31/2018  -     Diabetic Eye Screening Photo; Future    Gastroesophageal reflux disease without esophagitis    Arrhythmia, vagal  -     Holter monitor - 24 hour (CUPID ONLY-[facility], [facility]); Future      Patient readiness: acceptance and barriers:readiness and social stressors    During the course of the visit the patient was educated and counseled about the following:     Diabetes:  Discussed general issues about diabetes pathophysiology and management.  Hypertension:   Dietary sodium restriction.  Regular aerobic exercise.  Obesity:   General weight loss/lifestyle modification strategies discussed (elicit support from others; identify saboteurs; non-food rewards, etc).    Goals: Diabetes: Maintain Hemoglobin A1C below 7, Hypertension: Reduce Blood Pressure and Obesity: Reduce calorie intake and BMI    Did patient meet goals/outcomes: Yes    The following self management tools provided: blood pressure log  blood glucose log  excercise log    Patient Instructions (the written plan) was given to the patient/family.     Time spent with patient: 45 minutes

## 2018-09-13 RX ORDER — SIMVASTATIN 40 MG/1
TABLET, FILM COATED ORAL
Qty: 90 TABLET | Refills: 4 | Status: SHIPPED | OUTPATIENT
Start: 2018-09-13 | End: 2018-10-25 | Stop reason: ALTCHOICE

## 2018-09-25 ENCOUNTER — OFFICE VISIT (OUTPATIENT)
Dept: FAMILY MEDICINE | Facility: CLINIC | Age: 78
End: 2018-09-25
Payer: MEDICARE

## 2018-09-25 ENCOUNTER — LAB VISIT (OUTPATIENT)
Dept: LAB | Facility: HOSPITAL | Age: 78
End: 2018-09-25
Attending: PHYSICIAN ASSISTANT
Payer: MEDICARE

## 2018-09-25 VITALS
BODY MASS INDEX: 28.49 KG/M2 | DIASTOLIC BLOOD PRESSURE: 78 MMHG | HEIGHT: 73 IN | HEART RATE: 66 BPM | TEMPERATURE: 98 F | RESPIRATION RATE: 16 BRPM | OXYGEN SATURATION: 95 % | WEIGHT: 214.94 LBS | SYSTOLIC BLOOD PRESSURE: 124 MMHG

## 2018-09-25 DIAGNOSIS — E78.5 HYPERLIPIDEMIA ASSOCIATED WITH TYPE 2 DIABETES MELLITUS: ICD-10-CM

## 2018-09-25 DIAGNOSIS — M21.942 HAND DEFORMITY, ACQUIRED, LEFT: ICD-10-CM

## 2018-09-25 DIAGNOSIS — Z23 NEEDS FLU SHOT: ICD-10-CM

## 2018-09-25 DIAGNOSIS — E11.8 TYPE 2 DIABETES MELLITUS WITH COMPLICATION, WITHOUT LONG-TERM CURRENT USE OF INSULIN: ICD-10-CM

## 2018-09-25 DIAGNOSIS — E11.69 HYPERLIPIDEMIA ASSOCIATED WITH TYPE 2 DIABETES MELLITUS: ICD-10-CM

## 2018-09-25 DIAGNOSIS — I10 HYPERTENSION, WELL CONTROLLED: ICD-10-CM

## 2018-09-25 DIAGNOSIS — I48.20 CHRONIC ATRIAL FIBRILLATION: Primary | ICD-10-CM

## 2018-09-25 LAB
ALBUMIN SERPL BCP-MCNC: 4 G/DL
ALP SERPL-CCNC: 77 U/L
ALT SERPL W/O P-5'-P-CCNC: 55 U/L
ANION GAP SERPL CALC-SCNC: 10 MMOL/L
AST SERPL-CCNC: 39 U/L
BILIRUB SERPL-MCNC: 0.8 MG/DL
BUN SERPL-MCNC: 18 MG/DL
CALCIUM SERPL-MCNC: 9.6 MG/DL
CHLORIDE SERPL-SCNC: 105 MMOL/L
CHOLEST SERPL-MCNC: 149 MG/DL
CHOLEST/HDLC SERPL: 2.8 {RATIO}
CO2 SERPL-SCNC: 25 MMOL/L
CREAT SERPL-MCNC: 1.3 MG/DL
EST. GFR  (AFRICAN AMERICAN): 59.6 ML/MIN/1.73 M^2
EST. GFR  (NON AFRICAN AMERICAN): 51.5 ML/MIN/1.73 M^2
ESTIMATED AVG GLUCOSE: 143 MG/DL
GLUCOSE SERPL-MCNC: 118 MG/DL
HBA1C MFR BLD HPLC: 6.6 %
HDLC SERPL-MCNC: 53 MG/DL
HDLC SERPL: 35.6 %
LDLC SERPL CALC-MCNC: 73.8 MG/DL
NONHDLC SERPL-MCNC: 96 MG/DL
POTASSIUM SERPL-SCNC: 4.8 MMOL/L
PROT SERPL-MCNC: 7.3 G/DL
SODIUM SERPL-SCNC: 140 MMOL/L
TRIGL SERPL-MCNC: 111 MG/DL

## 2018-09-25 PROCEDURE — 90662 IIV NO PRSV INCREASED AG IM: CPT | Mod: PBBFAC,PO

## 2018-09-25 PROCEDURE — 80061 LIPID PANEL: CPT

## 2018-09-25 PROCEDURE — 83036 HEMOGLOBIN GLYCOSYLATED A1C: CPT

## 2018-09-25 PROCEDURE — 80053 COMPREHEN METABOLIC PANEL: CPT

## 2018-09-25 PROCEDURE — 99999 PR PBB SHADOW E&M-EST. PATIENT-LVL IV: CPT | Mod: PBBFAC,,, | Performed by: PHYSICIAN ASSISTANT

## 2018-09-25 PROCEDURE — 99214 OFFICE O/P EST MOD 30 MIN: CPT | Mod: PBBFAC,PO,25 | Performed by: PHYSICIAN ASSISTANT

## 2018-09-25 PROCEDURE — 99214 OFFICE O/P EST MOD 30 MIN: CPT | Mod: S$PBB,,, | Performed by: PHYSICIAN ASSISTANT

## 2018-09-25 PROCEDURE — 36415 COLL VENOUS BLD VENIPUNCTURE: CPT | Mod: PO
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
## 2018-09-27 RX ORDER — OMEPRAZOLE 20 MG/1
CAPSULE, DELAYED RELEASE ORAL
Qty: 90 CAPSULE | Refills: 3 | Status: SHIPPED | OUTPATIENT
Start: 2018-09-27 | End: 2019-01-08

## 2018-10-25 ENCOUNTER — TELEPHONE (OUTPATIENT)
Dept: FAMILY MEDICINE | Facility: CLINIC | Age: 78
End: 2018-10-25

## 2018-10-25 DIAGNOSIS — E78.5 DYSLIPIDEMIA: Primary | ICD-10-CM

## 2018-10-25 RX ORDER — DILTIAZEM HYDROCHLORIDE 300 MG/1
CAPSULE, COATED, EXTENDED RELEASE ORAL
Qty: 90 CAPSULE | Refills: 11 | Status: SHIPPED | OUTPATIENT
Start: 2018-10-25 | End: 2019-12-06 | Stop reason: SDUPTHER

## 2018-10-25 RX ORDER — ATORVASTATIN CALCIUM 20 MG/1
20 TABLET, FILM COATED ORAL DAILY
Qty: 90 TABLET | Refills: 3 | Status: SHIPPED | OUTPATIENT
Start: 2018-10-25 | End: 2019-01-08 | Stop reason: ALTCHOICE

## 2018-10-25 RX ORDER — APIXABAN 5 MG/1
TABLET, FILM COATED ORAL
Qty: 180 TABLET | Refills: 3 | Status: SHIPPED | OUTPATIENT
Start: 2018-10-25 | End: 2019-01-08 | Stop reason: SDUPTHER

## 2019-01-08 ENCOUNTER — LAB VISIT (OUTPATIENT)
Dept: LAB | Facility: HOSPITAL | Age: 79
End: 2019-01-08
Attending: NURSE PRACTITIONER
Payer: MEDICARE

## 2019-01-08 ENCOUNTER — OFFICE VISIT (OUTPATIENT)
Dept: FAMILY MEDICINE | Facility: CLINIC | Age: 79
End: 2019-01-08
Payer: MEDICARE

## 2019-01-08 VITALS
HEART RATE: 67 BPM | TEMPERATURE: 98 F | HEIGHT: 73 IN | DIASTOLIC BLOOD PRESSURE: 96 MMHG | BODY MASS INDEX: 28.49 KG/M2 | OXYGEN SATURATION: 92 % | WEIGHT: 214.94 LBS | SYSTOLIC BLOOD PRESSURE: 152 MMHG

## 2019-01-08 DIAGNOSIS — R82.90 BAD ODOR OF URINE: ICD-10-CM

## 2019-01-08 DIAGNOSIS — E11.69 HYPERLIPIDEMIA ASSOCIATED WITH TYPE 2 DIABETES MELLITUS: ICD-10-CM

## 2019-01-08 DIAGNOSIS — E11.69 HYPERLIPIDEMIA ASSOCIATED WITH TYPE 2 DIABETES MELLITUS: Primary | ICD-10-CM

## 2019-01-08 DIAGNOSIS — I10 HYPERTENSION, WELL CONTROLLED: ICD-10-CM

## 2019-01-08 DIAGNOSIS — E11.8 TYPE 2 DIABETES MELLITUS WITH COMPLICATION, WITHOUT LONG-TERM CURRENT USE OF INSULIN: ICD-10-CM

## 2019-01-08 DIAGNOSIS — E78.5 HYPERLIPIDEMIA ASSOCIATED WITH TYPE 2 DIABETES MELLITUS: Primary | ICD-10-CM

## 2019-01-08 DIAGNOSIS — I48.20 CHRONIC ATRIAL FIBRILLATION: ICD-10-CM

## 2019-01-08 DIAGNOSIS — E78.5 HYPERLIPIDEMIA ASSOCIATED WITH TYPE 2 DIABETES MELLITUS: ICD-10-CM

## 2019-01-08 LAB
ALBUMIN SERPL BCP-MCNC: 4.2 G/DL
ALBUMIN/CREAT UR: 23.8 UG/MG
ALP SERPL-CCNC: 75 U/L
ALT SERPL W/O P-5'-P-CCNC: 42 U/L
ANION GAP SERPL CALC-SCNC: 11 MMOL/L
AST SERPL-CCNC: 33 U/L
BILIRUB SERPL-MCNC: 0.7 MG/DL
BILIRUB UR QL STRIP: NEGATIVE
BUN SERPL-MCNC: 13 MG/DL
CALCIUM SERPL-MCNC: 9.4 MG/DL
CHLORIDE SERPL-SCNC: 106 MMOL/L
CLARITY UR REFRACT.AUTO: CLEAR
CO2 SERPL-SCNC: 25 MMOL/L
COLOR UR AUTO: YELLOW
CREAT SERPL-MCNC: 1.1 MG/DL
CREAT UR-MCNC: 223 MG/DL
EST. GFR  (AFRICAN AMERICAN): >60 ML/MIN/1.73 M^2
EST. GFR  (NON AFRICAN AMERICAN): >60 ML/MIN/1.73 M^2
GLUCOSE SERPL-MCNC: 49 MG/DL
GLUCOSE UR QL STRIP: NEGATIVE
HGB UR QL STRIP: NEGATIVE
KETONES UR QL STRIP: NEGATIVE
LEUKOCYTE ESTERASE UR QL STRIP: NEGATIVE
MICROALBUMIN UR DL<=1MG/L-MCNC: 53 UG/ML
NITRITE UR QL STRIP: NEGATIVE
PH UR STRIP: 5 [PH] (ref 5–8)
POTASSIUM SERPL-SCNC: 4.4 MMOL/L
PROT SERPL-MCNC: 7.4 G/DL
PROT UR QL STRIP: NEGATIVE
SODIUM SERPL-SCNC: 142 MMOL/L
SP GR UR STRIP: 1.02 (ref 1–1.03)
URN SPEC COLLECT METH UR: NORMAL

## 2019-01-08 PROCEDURE — 36415 COLL VENOUS BLD VENIPUNCTURE: CPT | Mod: PO

## 2019-01-08 PROCEDURE — 82043 UR ALBUMIN QUANTITATIVE: CPT

## 2019-01-08 PROCEDURE — 81003 URINALYSIS AUTO W/O SCOPE: CPT

## 2019-01-08 PROCEDURE — 87086 URINE CULTURE/COLONY COUNT: CPT

## 2019-01-08 PROCEDURE — 99214 OFFICE O/P EST MOD 30 MIN: CPT | Mod: PBBFAC,PO | Performed by: NURSE PRACTITIONER

## 2019-01-08 PROCEDURE — 99214 OFFICE O/P EST MOD 30 MIN: CPT | Mod: S$PBB,,, | Performed by: NURSE PRACTITIONER

## 2019-01-08 PROCEDURE — 99999 PR PBB SHADOW E&M-EST. PATIENT-LVL IV: CPT | Mod: PBBFAC,,, | Performed by: NURSE PRACTITIONER

## 2019-01-08 PROCEDURE — 80053 COMPREHEN METABOLIC PANEL: CPT

## 2019-01-08 PROCEDURE — 99999 PR PBB SHADOW E&M-EST. PATIENT-LVL IV: ICD-10-PCS | Mod: PBBFAC,,, | Performed by: NURSE PRACTITIONER

## 2019-01-08 PROCEDURE — 99214 PR OFFICE/OUTPT VISIT, EST, LEVL IV, 30-39 MIN: ICD-10-PCS | Mod: S$PBB,,, | Performed by: NURSE PRACTITIONER

## 2019-01-08 RX ORDER — SIMVASTATIN 40 MG/1
40 TABLET, FILM COATED ORAL NIGHTLY
Refills: 4 | COMMUNITY
Start: 2018-12-10 | End: 2019-12-04

## 2019-01-08 RX ORDER — ASPIRIN 81 MG/1
81 TABLET ORAL DAILY
COMMUNITY
End: 2019-03-13

## 2019-01-08 NOTE — PROGRESS NOTES
Subjective:       Patient ID: Domitila Walters is a 78 y.o. male.    Chief Complaint: medication check    Patient is 78 year old male presents today for medication clarification and complaints of bad odor of his urine. Patient has an his of Afib and was presribed Apizaban in 2016 by Cardiology. Patient reports the Cardiologist left town so he needs to find a new Cardiologist to discuss alternative medication for his Afib. Patient verbalies the Apixaban is cost $500.00 per month. He reports not taking the Apixaban for 3 weeks because the last of the medication got wet and the insurance wound not allow the pharmacy to refill medication.So he stated taking Asprin 81 mg daily. Scheduled Cardiology appointment is March 13 with . Patient said he will stop the aspirin and call the pharmacy to refill the Apixab until he visits Cardiology in March.  Patient complains of a malodorous urine for four days. Denies burring, urgency, frequency and flank pain. He verbalizes concerns of increase protein in urine.  Time spent with patient 35 minutes with 50% of time spent viewing history, labs and coordinating care. All questions and concerns addressed.      Urinary Tract Infection    This is a new problem. The current episode started 1 to 4 weeks ago. The problem occurs intermittently. The problem has been unchanged. The pain is at a severity of 0/10. The patient is experiencing no pain. There has been no fever. He is sexually active. There is no history of pyelonephritis. Pertinent negatives include no chills, discharge, frequency, nausea, urgency or bubble bath use. He has tried nothing for the symptoms. His past medical history is significant for diabetes mellitus and hypertension.     Review of Systems   Constitutional: Negative for appetite change, chills and fatigue.   HENT: Negative for congestion, postnasal drip, rhinorrhea and sinus pain.    Gastrointestinal: Negative for nausea.   Genitourinary: Negative for difficulty  "urinating, frequency and urgency.   Allergic/Immunologic: Negative for environmental allergies.   Psychiatric/Behavioral: Negative for agitation. The patient is not nervous/anxious.        Objective:      BP (!) 152/96 (BP Location: Right arm, Patient Position: Sitting, BP Method: Large (Automatic))   Pulse 67   Temp 98.4 °F (36.9 °C) (Oral)   Ht 6' 1" (1.854 m)   Wt 97.5 kg (214 lb 15.2 oz)   SpO2 (!) 92%   BMI 28.36 kg/m²   Physical Exam   Constitutional: He is oriented to person, place, and time. He appears well-developed and well-nourished.   Eyes: Conjunctivae and EOM are normal. Pupils are equal, round, and reactive to light.   Neck: Normal range of motion. Neck supple.   Cardiovascular: Normal rate, regular rhythm, normal heart sounds and intact distal pulses.   Pulmonary/Chest: Effort normal and breath sounds normal.   Abdominal: Soft. Bowel sounds are normal.   Musculoskeletal: Normal range of motion.   Neurological: He is alert and oriented to person, place, and time.   Skin: Skin is warm and dry.   Psychiatric: He has a normal mood and affect. His behavior is normal. Judgment and thought content normal.       Assessment:       1. Hyperlipidemia associated with type 2 diabetes mellitus    2. Hypertension, well controlled    3. BMI 28.0-28.9,adult    4. Chronic atrial fibrillation    5. Type 2 diabetes mellitus with complication, without long-term current use of insulin    6. Bad odor of urine        Plan:       Hyperlipidemia associated with type 2 diabetes mellitus  -     MICROALBUMIN / CREATININE RATIO URINE  -     Comprehensive metabolic panel; Future; Expected date: 01/08/2019    Hypertension, well controlled   Controlled continue medication   Blood pressure diary  BMI 28.0-28.9,adult   Follow ADA diet   Regular Excerise  Chronic atrial fibrillation  -     Ambulatory referral to Cardiology  -     apixaban (ELIQUIS) 5 mg Tab; Take 1 tablet (5 mg total) by mouth once daily.  Dispense: 30 tablet; " Refill: 3   Restart Medication    Bad odor of urine  -     POCT URINE DIPSTICK WITHOUT MICROSCOPE  -     Cancel: URINALYSIS  -     URINALYSIS; Future; Expected date: 01/08/2019  -     Urine culture; Future; Expected date: 01/08/2019      Patient readiness: acceptance and barriers:none    During the course of the visit the patient was educated and counseled about the following:     Hypertension:   Dietary sodium restriction.  Regular aerobic exercise.  Obesity:   General weight loss/lifestyle modification strategies discussed (elicit support from others; identify saboteurs; non-food rewards, etc).  Regular aerobic exercise program discussed.    Goals: Hypertension: Reduce Blood Pressure and Obesity: Reduce calorie intake and BMI    Did patient meet goals/outcomes: No    The following self management tools provided: declined    Patient Instructions (the written plan) was given to the patient/family.     Time spent with patient: 45 minutes    Barriers to medications present (no )    Adverse reactions to current medications (no)    Over the counter medications reviewed (Yes)

## 2019-01-09 ENCOUNTER — TELEPHONE (OUTPATIENT)
Dept: FAMILY MEDICINE | Facility: CLINIC | Age: 79
End: 2019-01-09

## 2019-01-09 LAB — BACTERIA UR CULT: NO GROWTH

## 2019-01-09 NOTE — TELEPHONE ENCOUNTER
----- Message from Brenton Gama NP sent at 1/9/2019 11:02 AM CST -----  Portal Notified. Please call patient to give message below.    The blood lab work showed a very low glucose level on yesterday. Please check your blood sugar today. Blood sugar ranges should be 70 to 100. Your blood sugar on yesterday was 49. I do remember you staying you had not eaten at the visit. If your blood sugar reading falls below 65 you need to eat some protein and something with sugar. Your liver function is normal and kidney function is normal is back to normal. The protein level in your blood and urine is normal.  I'm waiting on your urine culture to results.Please call with questions and/or concerns

## 2019-02-06 DIAGNOSIS — M79.2 PERIPHERAL NEUROPATHIC PAIN: ICD-10-CM

## 2019-02-07 RX ORDER — FOLIC ACID-PYRIDOXINE-CYANOCOBALAMIN TAB 2.5-25-2 MG 2.5-25-2 MG
TAB ORAL
Qty: 90 TABLET | Refills: 4 | Status: SHIPPED | OUTPATIENT
Start: 2019-02-07 | End: 2020-03-02

## 2019-03-13 ENCOUNTER — OFFICE VISIT (OUTPATIENT)
Dept: CARDIOLOGY | Facility: CLINIC | Age: 79
End: 2019-03-13
Payer: MEDICARE

## 2019-03-13 VITALS
HEIGHT: 73 IN | OXYGEN SATURATION: 89 % | HEART RATE: 80 BPM | BODY MASS INDEX: 28.08 KG/M2 | DIASTOLIC BLOOD PRESSURE: 71 MMHG | SYSTOLIC BLOOD PRESSURE: 138 MMHG | WEIGHT: 211.88 LBS

## 2019-03-13 DIAGNOSIS — E11.8 TYPE 2 DIABETES MELLITUS WITH COMPLICATION, WITHOUT LONG-TERM CURRENT USE OF INSULIN: ICD-10-CM

## 2019-03-13 DIAGNOSIS — E65 ABDOMINAL OBESITY: ICD-10-CM

## 2019-03-13 DIAGNOSIS — I34.0 NON-RHEUMATIC MITRAL REGURGITATION: ICD-10-CM

## 2019-03-13 DIAGNOSIS — E11.69 HYPERLIPIDEMIA ASSOCIATED WITH TYPE 2 DIABETES MELLITUS: ICD-10-CM

## 2019-03-13 DIAGNOSIS — Z76.89 ENCOUNTER TO ESTABLISH CARE: Primary | ICD-10-CM

## 2019-03-13 DIAGNOSIS — I10 HYPERTENSION, WELL CONTROLLED: ICD-10-CM

## 2019-03-13 DIAGNOSIS — Z91.89 AT RISK FOR CARDIOVASCULAR EVENT: ICD-10-CM

## 2019-03-13 DIAGNOSIS — Z76.89 ENCOUNTER TO ESTABLISH CARE: ICD-10-CM

## 2019-03-13 DIAGNOSIS — I48.20 CHRONIC ATRIAL FIBRILLATION: Primary | ICD-10-CM

## 2019-03-13 DIAGNOSIS — E78.5 HYPERLIPIDEMIA ASSOCIATED WITH TYPE 2 DIABETES MELLITUS: ICD-10-CM

## 2019-03-13 PROCEDURE — 93010 ELECTROCARDIOGRAM REPORT: CPT | Mod: S$PBB,,, | Performed by: INTERNAL MEDICINE

## 2019-03-13 PROCEDURE — 99215 OFFICE O/P EST HI 40 MIN: CPT | Mod: S$PBB,25,, | Performed by: INTERNAL MEDICINE

## 2019-03-13 PROCEDURE — 99215 PR OFFICE/OUTPT VISIT, EST, LEVL V, 40-54 MIN: ICD-10-PCS | Mod: S$PBB,25,, | Performed by: INTERNAL MEDICINE

## 2019-03-13 PROCEDURE — 99999 PR PBB SHADOW E&M-EST. PATIENT-LVL III: CPT | Mod: PBBFAC,,, | Performed by: INTERNAL MEDICINE

## 2019-03-13 PROCEDURE — 99213 OFFICE O/P EST LOW 20 MIN: CPT | Mod: PBBFAC,PO | Performed by: INTERNAL MEDICINE

## 2019-03-13 PROCEDURE — 93010 EKG 12-LEAD: ICD-10-PCS | Mod: S$PBB,,, | Performed by: INTERNAL MEDICINE

## 2019-03-13 PROCEDURE — 99999 PR PBB SHADOW E&M-EST. PATIENT-LVL III: ICD-10-PCS | Mod: PBBFAC,,, | Performed by: INTERNAL MEDICINE

## 2019-03-13 PROCEDURE — 93005 ELECTROCARDIOGRAM TRACING: CPT | Mod: PBBFAC,PO | Performed by: INTERNAL MEDICINE

## 2019-03-13 NOTE — PATIENT INSTRUCTIONS
Recommended Mediterranean dietEating Heart-Healthy Food: Using the DASH Plan  Eating for your heart doesnt have to be hard or boring. You just need to know how to make healthier choices. The DASH eating plan has been developed to help you do just that. DASH stands for Dietary Approaches to Stop Hypertension. It is a plan that has been proven to be healthier for your heart and to lower your risk for high blood pressure. It can also help lower your risk for cancer, heart disease, osteoporosis, and diabetes.  Choosing from Each Food Group  Choose foods from each of the food groups below each day. Try to get the recommended number of servings for each food group. The serving numbers are based on a diet of 2,000 calories a day. Talk to your doctor if youre unsure about your calorie needs.  Grains   Servings: 7-8 a day  A serving is:  · 1 slice bread  · 1 ounce dry cereal  · half a cup cooked rice or pasta  Best choices: Whole grains and any grains high in fiber.  Vegetables   Servings: 4-5 a day  A serving is:  · 1 cup raw leafy vegetable  · Half a cup cooked vegetable  · Three-quarter cup vegetable juice  Best choices: Fresh or frozen vegetable prepared without too much added salt or fat.    Fruits   Servings: 4-5 a day  A serving is:  · Three-quarter cup fruit juice  · 1 medium fruit  · One-quarter cup dried fruit  · One-half cup fresh, frozen, or canned fruit  Best choices: A variety of fresh fruits of different colors. Whole fruits are a much better choice than fruit juices.  Low-fat or Fat Free Dairy   Servings: 2-3 a day  A serving is:  · 8 ounces milk  · 1 cup yogurt  · One and a half ounces cheese  Best choices: Skim or 1% milk, low-fat or fat free yogurt or buttermilk, and low-fat cheeses.       Meat, Poultry, Fish   Servings: 2 or fewer a day  A serving is:  · 3 ounces cooked meat, poultry, or fish  Best choices: Lean meats and fish. Trim away visible fat. Broil, roast, or boil instead of frying. Remove skin  from poultry before eating.  Nuts, Seeds, Beans   Servings: 4-5 a week  A serving is:  · One third cup nuts (or one and a half ounces)  · 2 tablespoons sunflower seeds  · Half a cup cooked beans  Best choices: Dry roasted nuts with no salt added, lentils, kidney beans, garbanzo beans, and whole cervantes beans.    Fats and Oils   Servings: 2 a day  A serving is:  · 1 teaspoon vegetable oil  · 1 teaspoon soft margarine  · 1 tablespoon low-fat mayonnaise  · 1 teaspoon regular mayonnaise  · 2 tablespoons light salad dressing  · 1 tablespoon regular salad dressing  Best choices: Monounsaturated and polyunsaturated fats such as olive, canola, or safflower oil.  Sweets   Servings: 5 a week or fewer  A serving is:  · 1 tablespoon sugar, maple syrup, or honey  · 1 tablespoon jam or jelly  · 1 half-ounce jelly beans (about 15)  · 8 ounces lemonade  Best choices: Dried fruit can be a satisfying sweet. Choose low-fat sweets when possible. And watch your serving sizes!       Aerobic Exercise for a Healthy Heart  Exercise is a lot more than an energy booster and a stress reliever. It also strengthens your heart muscle, lowers your blood pressure and blood cholesterol, and burns calories.      Remember, some activity is better than none.     Choose an Aerobic Activity  Choose a nonstop activity that makes your heart and lungs work harder than they do when you rest or walk normally. This aerobic exercise can improve the way your heart and other muscles use oxygen. Make it fun by exercising with a friend and choosing an activity you enjoy. Here are some ideas:  · Walking  · Swimming  · Bicycling  · Stair climbing  · Dancing  · Jogging  Exercise Regularly  If you havent been exercising regularly,  get your doctors okay first. Then start slowly.  Here are some tips:  · Begin exercising 3 times a week for 5-10 minutes at a time.  · When you feel comfortable, add a few minutes each week.  · Slowly build up to exercising 3-4 times each  week for 20-40 minutes. Aim for a total of 150 or more minutes a week.  · Be sure to carry your nitroglycerin with you when you exercise.  · If you get angina when youre exercising, stop what youre doing, take your nitroglycerin, and call your doctor.  © 8083-3734 [employer], [address] [place] [address], [place] [address]. All rights reserved. This information is not intended as a substitute for professional medical care. Always follow your healthcare professional's instructions.

## 2019-03-13 NOTE — LETTER
March 13, 2019      Brenton Gama, CABRERA  [address]  [place] [address]           [facility]  [address]  [place] [address]  Phone: [phone]          Patient: Domitila Walters   MR Number: [id number]   YOB: 1940   Date of Visit: 3/13/2019       Dear Brenton Gama:    Thank you for referring Domitila Walters to me for evaluation. Attached you will find relevant portions of my assessment and plan of care.    If you have questions, please do not hesitate to call me. I look forward to following Domitila Walters along with you.    Sincerely,    Andrés Dalton MD    Enclosure  CC:  No Recipients    If you would like to receive this communication electronically, please contact [email or web address] or [phone] to request more information on [employer] access.    For providers and/or their staff who would like to refer a patient to [facility], please contact us through our one-stop-shop provider referral line, [facility] , at [phone].    If you feel you have received this communication in error or would no longer like to receive these types of communications, please e-mail [email or web address]

## 2019-03-19 ENCOUNTER — HOSPITAL ENCOUNTER (OUTPATIENT)
Dept: CARDIOLOGY | Facility: HOSPITAL | Age: 79
Discharge: HOME | End: 2019-03-19
Attending: INTERNAL MEDICINE
Payer: MEDICARE

## 2019-03-19 ENCOUNTER — HOSPITAL ENCOUNTER (OUTPATIENT)
Dept: RADIOLOGY | Facility: HOSPITAL | Age: 79
Discharge: HOME | End: 2019-03-19
Attending: INTERNAL MEDICINE
Payer: MEDICARE

## 2019-03-19 VITALS — SYSTOLIC BLOOD PRESSURE: 140 MMHG | DIASTOLIC BLOOD PRESSURE: 88 MMHG | HEART RATE: 56 BPM

## 2019-03-19 DIAGNOSIS — Z91.89 AT RISK FOR CARDIOVASCULAR EVENT: ICD-10-CM

## 2019-03-19 DIAGNOSIS — I48.20 CHRONIC ATRIAL FIBRILLATION: ICD-10-CM

## 2019-03-19 DIAGNOSIS — E11.8 TYPE 2 DIABETES MELLITUS WITH COMPLICATION, WITHOUT LONG-TERM CURRENT USE OF INSULIN: ICD-10-CM

## 2019-03-19 DIAGNOSIS — I10 HYPERTENSION, WELL CONTROLLED: ICD-10-CM

## 2019-03-19 DIAGNOSIS — I34.0 NON-RHEUMATIC MITRAL REGURGITATION: ICD-10-CM

## 2019-03-19 PROCEDURE — 93306 TRANSTHORACIC ECHO (TTE) COMPLETE (CUPID ONLY): ICD-10-PCS | Mod: 26,,, | Performed by: INTERNAL MEDICINE

## 2019-03-19 PROCEDURE — 78452 NM MYOCARDIAL PERFUSION SPECT MULTI PHARM: ICD-10-PCS | Mod: 26,,, | Performed by: RADIOLOGY

## 2019-03-19 PROCEDURE — C8929 TTE W OR WO FOL WCON,DOPPLER: HCPCS

## 2019-03-19 PROCEDURE — 63600175 PHARM REV CODE 636 W HCPCS: Performed by: INTERNAL MEDICINE

## 2019-03-19 PROCEDURE — 78452 HT MUSCLE IMAGE SPECT MULT: CPT | Mod: 26,,, | Performed by: RADIOLOGY

## 2019-03-19 PROCEDURE — A9502 TC99M TETROFOSMIN: HCPCS

## 2019-03-19 PROCEDURE — 93017 CV STRESS TEST TRACING ONLY: CPT

## 2019-03-19 PROCEDURE — 25500020 PHARM REV CODE 255: Performed by: INTERNAL MEDICINE

## 2019-03-19 PROCEDURE — 93306 TTE W/DOPPLER COMPLETE: CPT | Mod: 26,,, | Performed by: INTERNAL MEDICINE

## 2019-03-19 RX ORDER — REGADENOSON 0.08 MG/ML
0.4 INJECTION, SOLUTION INTRAVENOUS ONCE
Status: COMPLETED | OUTPATIENT
Start: 2019-03-19 | End: 2019-03-19

## 2019-03-19 RX ADMIN — REGADENOSON 0.4 MG: 0.08 INJECTION, SOLUTION INTRAVENOUS at 09:03

## 2019-03-19 RX ADMIN — SULFUR HEXAFLUORIDE 5 ML: KIT at 11:03

## 2019-03-20 LAB
AORTIC ROOT ANNULUS: 3.44 CM
AORTIC VALVE CUSP SEPERATION: 1.56 CM
AV INDEX (PROSTH): 0.74
AV MEAN GRADIENT: 4.88 MMHG
AV PEAK GRADIENT: 7.51 MMHG
AV VALVE AREA: 2.24 CM2
AV VELOCITY RATIO: 0.85
CV ECHO LV RWT: 0.3 CM
DOP CALC AO PEAK VEL: 1.37 M/S
DOP CALC AO VTI: 30.17 CM
DOP CALC LVOT AREA: 3.05 CM2
DOP CALC LVOT DIAMETER: 1.97 CM
DOP CALC LVOT PEAK VEL: 1.16 M/S
DOP CALC LVOT STROKE VOLUME: 67.63 CM3
DOP CALCLVOT PEAK VEL VTI: 22.2 CM
E WAVE DECELERATION TIME: 236.83 MSEC
E/A RATIO: 99
E/E' RATIO: 9
ECHO LV POSTERIOR WALL: 0.94 CM (ref 0.6–1.1)
FRACTIONAL SHORTENING: 32 % (ref 28–44)
INTERVENTRICULAR SEPTUM: 0.94 CM (ref 0.6–1.1)
IVRT: 0.07 MSEC
LEFT ATRIUM SIZE: 4.84 CM
LEFT INTERNAL DIMENSION IN SYSTOLE: 4.18 CM (ref 2.1–4)
LEFT VENTRICLE DIASTOLIC VOLUME: 191.8 ML
LEFT VENTRICLE SYSTOLIC VOLUME: 77.6 ML
LEFT VENTRICULAR INTERNAL DIMENSION IN DIASTOLE: 6.17 CM (ref 3.5–6)
LEFT VENTRICULAR MASS: 239.2 G
LV LATERAL E/E' RATIO: 8.25
LV SEPTAL E/E' RATIO: 9.9
MV PEAK A VEL: 0.01 M/S
MV PEAK E VEL: 0.99 M/S
PISA TR MAX VEL: 2.52 M/S
PV PEAK VELOCITY: 1.18 CM/S
RIGHT VENTRICULAR END-DIASTOLIC DIMENSION: 3.67 CM
TDI LATERAL: 0.12
TDI SEPTAL: 0.1
TDI: 0.11
TR MAX PG: 25.4 MMHG
TRICUSPID ANNULAR PLANE SYSTOLIC EXCURSION: 1.95 CM

## 2019-03-21 ENCOUNTER — PATIENT MESSAGE (OUTPATIENT)
Dept: FAMILY MEDICINE | Facility: CLINIC | Age: 79
End: 2019-03-21

## 2019-03-21 LAB
CV STRESS BASE HR: 55 BPM
DIASTOLIC BLOOD PRESSURE: 93 MMHG
OHS CV CPX 1 MINUTE RECOVERY HEART RATE: 95 BPM
OHS CV CPX 85 PERCENT MAX PREDICTED HEART RATE MALE: 118
OHS CV CPX ESTIMATED METS: 5
OHS CV CPX MAX PREDICTED HEART RATE: 139
OHS CV CPX PATIENT IS FEMALE: 0
OHS CV CPX PATIENT IS MALE: 1
OHS CV CPX PEAK DIASTOLIC BLOOD PRESSURE: 75 MMHG
OHS CV CPX PEAK HEAR RATE: 106 BPM
OHS CV CPX PEAK RATE PRESSURE PRODUCT: NORMAL
OHS CV CPX PEAK SYSTOLIC BLOOD PRESSURE: 213 MMHG
OHS CV CPX PERCENT MAX PREDICTED HEART RATE ACHIEVED: 76
OHS CV CPX RATE PRESSURE PRODUCT PRESENTING: 6930
STRESS ECHO POST EXERCISE DUR MIN: 3 MIN
STRESS ECHO POST EXERCISE DUR SEC: 59
SYSTOLIC BLOOD PRESSURE: 126 MMHG

## 2019-03-21 NOTE — TELEPHONE ENCOUNTER
I spoke to patient and he stated Dr. Dalton ordered and resulted Stress Test.  He states he would like a copy of the stress test.   I referred him to Dr. Dalton's office to get results.

## 2019-03-31 ENCOUNTER — EXTERNAL CHRONIC CARE MANAGEMENT (OUTPATIENT)
Dept: PRIMARY CARE CLINIC | Facility: CLINIC | Age: 79
End: 2019-03-31
Payer: MEDICARE

## 2019-03-31 PROCEDURE — 99490 PR CHRONIC CARE MGMT, 1ST 20 MIN: ICD-10-PCS | Mod: S$PBB,,, | Performed by: FAMILY MEDICINE

## 2019-03-31 PROCEDURE — 99490 CHRNC CARE MGMT STAFF 1ST 20: CPT | Mod: PBBFAC,PO | Performed by: FAMILY MEDICINE

## 2019-03-31 PROCEDURE — 99490 CHRNC CARE MGMT STAFF 1ST 20: CPT | Mod: S$PBB,,, | Performed by: FAMILY MEDICINE

## 2019-04-24 ENCOUNTER — OFFICE VISIT (OUTPATIENT)
Dept: PODIATRY | Facility: CLINIC | Age: 79
End: 2019-04-24
Payer: MEDICARE

## 2019-04-24 VITALS
WEIGHT: 211 LBS | DIASTOLIC BLOOD PRESSURE: 73 MMHG | HEART RATE: 57 BPM | SYSTOLIC BLOOD PRESSURE: 126 MMHG | BODY MASS INDEX: 27.96 KG/M2 | HEIGHT: 73 IN

## 2019-04-24 DIAGNOSIS — E11.49 TYPE II DIABETES MELLITUS WITH NEUROLOGICAL MANIFESTATIONS: Primary | ICD-10-CM

## 2019-04-24 DIAGNOSIS — B35.1 ONYCHOMYCOSIS DUE TO DERMATOPHYTE: ICD-10-CM

## 2019-04-24 PROCEDURE — 99203 OFFICE O/P NEW LOW 30 MIN: CPT | Mod: ,,, | Performed by: PODIATRIST

## 2019-04-24 PROCEDURE — 99203 PR OFFICE/OUTPT VISIT, NEW, LEVL III, 30-44 MIN: ICD-10-PCS | Mod: ,,, | Performed by: PODIATRIST

## 2019-04-24 PROCEDURE — 17999 UNLISTD PX SKN MUC MEMB SUBQ: CPT | Mod: CSM,,, | Performed by: PODIATRIST

## 2019-04-24 PROCEDURE — 17999 PR NON-COVERED FOOT CARE: ICD-10-PCS | Mod: CSM,,, | Performed by: PODIATRIST

## 2019-04-24 NOTE — PROGRESS NOTES
"  [address]  [place] [address]  Phone: [phone]   [phone]    Patient's PCP:Isiah Crespo MD  Referring Provider: No ref. provider found    Subjective:      Chief Complaint:: Nail Care and Diabetic Foot Exam    HPI  Domitila Walters is a 79 y.o. male who presents with a complaint of diabetic foot exam, bilateral nail care, and history of previous injury to the right great toe. Patient states his feet "constantly burn, feels like standing on something hot, summer feet get really hot and skin is real tough on bottom." He has not tried any treatments.       Systemic Doctor: Isiah Crespo MD  Date Last Seen: 6/8/2018  Blood Sugar: 49 1/08/19  Hemoglobin A1c: 6.6 9/25/18    Vitals:    04/24/19 1356   BP: 126/73   Pulse: (!) 57     Shoe Size:     Past Surgical History:   Procedure Laterality Date    ABCESS DRAINAGE Left 08/26/2013    COLONOSCOPY      INCISION AND DRAINAGE, ABSCESS Left 8/26/2013    Performed by Rober Montanez MD at [facility]    SKIN GRAFT Left     TONSILLECTOMY       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  Tobacco History:  reports that he has never smoked. He has never used smokeless tobacco.  Drug History:  reports that he does not use drugs.    Review of patient's allergies indicates:  No Known Allergies    Current Outpatient Medications   Medication Sig Dispense Refill "    apixaban (ELIQUIS) 5 mg Tab Take 1 tablet (5 mg total) by mouth 2 (two) times daily. 180 tablet 3    cholecalciferol, vitamin D3, 1,000 unit capsule Take 1 capsule (1,000 Units total) by mouth once daily. 90 capsule 11    diltiaZEM (CARDIZEM CD) 300 MG 24 hr capsule TAKE ONE CAPSULE BY MOUTH EVERY DAY 90 capsule 11    FOLBIC 2.5-25-2 mg Tab TAKE ONE TABLET BY MOUTH DAILY 90 tablet 4    ranitidine (ZANTAC) 150 MG tablet Take 150 mg by mouth 2 (two) times daily.  0    repaglinide (PRANDIN) 1 MG tablet TAKE ONE TABLET BY MOUTH TWICE DAILY BEFORE MEALS 180 tablet 3    simvastatin (ZOCOR) 40 MG tablet Take 40 mg by mouth every evening.  4     No current facility-administered medications for this visit.        Review of Systems      Objective:        Physical Exam:   Foot Exam    General  General Appearance: appears stated age and healthy   Orientation: alert and oriented to person, place, and time   Affect: appropriate   Gait: unimpaired       Right Foot/Ankle     Inspection and Palpation  Ecchymosis: none  Tenderness: none   Swelling: none   Arch: normal  Skin Exam: dry skin; no ulcer and no erythema     Neurovascular  Dorsalis pedis: 2+  Posterior tibial: 2+  Saphenous nerve sensation: normal  Tibial nerve sensation: normal  Superficial peroneal nerve sensation: normal  Deep peroneal nerve sensation: normal  Sural nerve sensation: normal    Muscle Strength  Ankle dorsiflexion: 5  Ankle plantar flexion: 5  Ankle inversion: 5  Ankle eversion: 5  Great toe extension: 5  Great toe flexion: 5    Comments  Nails 1 through 5 are thickened, discolored, dystrophic, and elongated with subungual debris      Left Foot/Ankle      Inspection and Palpation  Ecchymosis: none  Tenderness: none   Swelling: none   Arch: normal  Skin Exam: dry skin; no ulcer and no erythema     Neurovascular  Dorsalis pedis: 2+  Posterior tibial: 2+  Saphenous nerve sensation: normal  Tibial nerve sensation: normal  Superficial peroneal nerve  sensation: normal  Deep peroneal nerve sensation: normal  Sural nerve sensation: normal    Muscle Strength  Ankle dorsiflexion: 5  Ankle plantar flexion: 5  Ankle inversion: 5  Ankle eversion: 5  Great toe extension: 5  Great toe flexion: 5    Comments  Nails 1 through 5 are slightly discolored and elongated with       Physical Exam   Cardiovascular:   Pulses:       Dorsalis pedis pulses are 2+ on the right side, and 2+ on the left side.        Posterior tibial pulses are 2+ on the right side, and 2+ on the left side.   Feet:   Right Foot:   Skin Integrity: Positive for dry skin. Negative for ulcer or erythema.   Left Foot:   Skin Integrity: Positive for dry skin. Negative for ulcer or erythema.       Imaging: none            Assessment:       1. Type II diabetes mellitus with neurological manifestations    2. Onychomycosis due to dermatophyte      Plan:   Type II diabetes mellitus with neurological manifestations    Onychomycosis due to dermatophyte      Follow up if symptoms worsen or fail to improve.    Procedures - - Shoe inspection. Patient instructed on proper foot hygeine. We discussed wearing proper shoe gear, daily foot inspections, never walking without protective shoe gear, never putting sharp instruments to feet, routine podiatric nail visits every 2-3 months.      - With patient's permission, nails were aggressively reduced and debrided x 10 to their soft tissue attachment mechanically and with electric , removing all offending nail and debris. Patient relates relief following the procedure. He will continue to monitor the areas daily, inspect his feet, wear protective shoe gear when ambulatory, moisturizer to maintain skin integrity and follow in this office in approximately 2-3 months, sooner p.r.n.    Check feet daily  No barefoot  Recommend vitamin B supplement  If neuropathy symptoms do not improve, recommend follow up with PCP for medication options     Counseling:     I provided patient  education verbally regarding:   Patient diagnosis, treatment options, as well as alternatives, risks, and benefits.     Fungal infection of toenails explained. Treatment options including no treatment, periodic debridement, topical medications, oral medications, and removal of the nail were discussed, as well as success rates and risks of recurrence. We agreed on periodic debridement      Counseled patient on the aspects of diabetes and how it pertains to the feet.  I explained the importance of proper diabetic foot care and how it is essential for the health of their feet.    I discussed the importance of knowing their HGA1c and that the level needs to be as close to 6 as possible.  I discussed the increase complications of high blood sugar including stroke, blindness, heart attack, kidney failure and loss of limb secondary to neuropathy and PVD.    Patient  was made aware of inspecting their feet.  Patient was told to be aware of any breaks in the skin or redness.  With neuropathy, these areas are not recognized early due to the numbness.  I discussed the lab test and NCV EMG test and also the role of the neurologist in evaluating the patient.  I discussed Diabetes, lower back issues, metabolic disorders, systemic causes, chemotherapy, viatmain defiencey, heavy metal exposure, as some of the causes.  I also explained that as much as 40% of the time we can not find a cause.  I discussed different treatments available to control the symptoms but whcih may not cure the problem.      Shoe inspection. Patient instructed on proper foot hygeine. We discussed wearing proper shoe gear, daily foot inspections, never walking without protective shoe gear, never putting sharp instruments to feet, routine podiatric nail visits every 2-3 months.        This note was created using Dragon voice recognition software that occasionally misinterpreted phrases or words.

## 2019-04-25 DIAGNOSIS — E11.59 CONTROLLED TYPE 2 DIABETES MELLITUS WITH OTHER CIRCULATORY COMPLICATION, WITHOUT LONG-TERM CURRENT USE OF INSULIN: ICD-10-CM

## 2019-04-25 RX ORDER — REPAGLINIDE 1 MG/1
TABLET ORAL
Qty: 180 TABLET | Refills: 3 | Status: SHIPPED | OUTPATIENT
Start: 2019-04-25 | End: 2020-03-05

## 2019-04-30 ENCOUNTER — EXTERNAL CHRONIC CARE MANAGEMENT (OUTPATIENT)
Dept: PRIMARY CARE CLINIC | Facility: CLINIC | Age: 79
End: 2019-04-30
Payer: MEDICARE

## 2019-04-30 PROCEDURE — 99490 CHRNC CARE MGMT STAFF 1ST 20: CPT | Mod: PBBFAC,PO | Performed by: FAMILY MEDICINE

## 2019-04-30 PROCEDURE — 99490 PR CHRONIC CARE MGMT, 1ST 20 MIN: ICD-10-PCS | Mod: S$PBB,,, | Performed by: FAMILY MEDICINE

## 2019-04-30 PROCEDURE — 99490 CHRNC CARE MGMT STAFF 1ST 20: CPT | Mod: S$PBB,,, | Performed by: FAMILY MEDICINE

## 2019-05-20 ENCOUNTER — PATIENT OUTREACH (OUTPATIENT)
Dept: ADMINISTRATIVE | Facility: HOSPITAL | Age: 79
End: 2019-05-20

## 2019-05-20 DIAGNOSIS — E11.9 DIABETES MELLITUS WITHOUT COMPLICATION: Primary | ICD-10-CM

## 2019-05-20 NOTE — LETTER
May 28, 2019    Domitila Walters  [address]  [facility] [address]             [facility]  [address]  [facility] [address]  Phone: [phone] [facility] is committed to your overall health.  To help you get the most out of each of your visits, we will review your information to make sure you are up to date on all of your recommended tests and/or procedures.       Dr. Isiah Crespo MD has found that your chart shows you may be due for a:     DIABETIC EYE EXAM   HEMOGLOBIN A1C (LAB)     If you have had any of the above done at another facility, please bring the records or information with you so that your record at [facility] will be complete.  If you would like to schedule any of these, please contact the clinic at [phone].     If you are currently taking medication, please bring it with you to your appointment for review.     Also, if you have any type of Advanced Directives, please bring them with you to your office visit so we may scan them into your chart.     Thank You,     Your [facility] Team,   MD Nelly Joseph LPN Clinical Care Coordinator   [facility]   [address] [facility] [address]   Phone [phone]   Fax [phone]

## 2019-05-20 NOTE — PROGRESS NOTES
Health Maintenance Due   Topic Date Due    Pneumococcal Vaccine (65+ Low/Medium Risk) (2 of 2 - PCV13) 01/05/2019    Hemoglobin A1c  03/25/2019    Eye Exam  06/04/2019

## 2019-05-31 ENCOUNTER — EXTERNAL CHRONIC CARE MANAGEMENT (OUTPATIENT)
Dept: PRIMARY CARE CLINIC | Facility: CLINIC | Age: 79
End: 2019-05-31
Payer: MEDICARE

## 2019-05-31 PROCEDURE — 99490 CHRNC CARE MGMT STAFF 1ST 20: CPT | Mod: PBBFAC,PO | Performed by: FAMILY MEDICINE

## 2019-05-31 PROCEDURE — 99490 CHRNC CARE MGMT STAFF 1ST 20: CPT | Mod: S$PBB,,, | Performed by: FAMILY MEDICINE

## 2019-05-31 PROCEDURE — 99490 PR CHRONIC CARE MGMT, 1ST 20 MIN: ICD-10-PCS | Mod: S$PBB,,, | Performed by: FAMILY MEDICINE

## 2019-06-03 ENCOUNTER — OFFICE VISIT (OUTPATIENT)
Dept: FAMILY MEDICINE | Facility: CLINIC | Age: 79
End: 2019-06-03
Payer: MEDICARE

## 2019-06-03 VITALS
DIASTOLIC BLOOD PRESSURE: 87 MMHG | HEART RATE: 53 BPM | HEIGHT: 73 IN | TEMPERATURE: 98 F | SYSTOLIC BLOOD PRESSURE: 132 MMHG | WEIGHT: 209.44 LBS | BODY MASS INDEX: 27.76 KG/M2

## 2019-06-03 DIAGNOSIS — E11.69 HYPERLIPIDEMIA ASSOCIATED WITH TYPE 2 DIABETES MELLITUS: ICD-10-CM

## 2019-06-03 DIAGNOSIS — E78.5 HYPERLIPIDEMIA ASSOCIATED WITH TYPE 2 DIABETES MELLITUS: ICD-10-CM

## 2019-06-03 DIAGNOSIS — I10 HYPERTENSION, WELL CONTROLLED: Primary | ICD-10-CM

## 2019-06-03 PROCEDURE — 99214 PR OFFICE/OUTPT VISIT, EST, LEVL IV, 30-39 MIN: ICD-10-PCS | Mod: S$PBB,,, | Performed by: FAMILY MEDICINE

## 2019-06-03 PROCEDURE — 99214 OFFICE O/P EST MOD 30 MIN: CPT | Mod: S$PBB,,, | Performed by: FAMILY MEDICINE

## 2019-06-03 PROCEDURE — 99999 PR PBB SHADOW E&M-EST. PATIENT-LVL III: ICD-10-PCS | Mod: PBBFAC,,, | Performed by: FAMILY MEDICINE

## 2019-06-03 PROCEDURE — 99999 PR PBB SHADOW E&M-EST. PATIENT-LVL III: CPT | Mod: PBBFAC,,, | Performed by: FAMILY MEDICINE

## 2019-06-03 PROCEDURE — 99213 OFFICE O/P EST LOW 20 MIN: CPT | Mod: PBBFAC,PO | Performed by: FAMILY MEDICINE

## 2019-06-03 NOTE — PATIENT INSTRUCTIONS
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
## 2019-06-25 ENCOUNTER — TELEPHONE (OUTPATIENT)
Dept: FAMILY MEDICINE | Facility: CLINIC | Age: 79
End: 2019-06-25

## 2019-06-25 ENCOUNTER — CLINICAL SUPPORT (OUTPATIENT)
Dept: FAMILY MEDICINE | Facility: CLINIC | Age: 79
End: 2019-06-25
Payer: MEDICARE

## 2019-06-25 VITALS — DIASTOLIC BLOOD PRESSURE: 70 MMHG | SYSTOLIC BLOOD PRESSURE: 122 MMHG | HEART RATE: 62 BPM

## 2019-06-25 PROCEDURE — 99212 OFFICE O/P EST SF 10 MIN: CPT | Mod: PBBFAC,PO

## 2019-06-25 PROCEDURE — 99999 PR PBB SHADOW E&M-EST. PATIENT-LVL II: CPT | Mod: PBBFAC,,,

## 2019-06-25 PROCEDURE — 99999 PR PBB SHADOW E&M-EST. PATIENT-LVL II: ICD-10-PCS | Mod: PBBFAC,,,

## 2019-06-25 NOTE — TELEPHONE ENCOUNTER
Patient present for nurse blood pressure check. Denies chest pain, headache, dizziness, dyspnea, or pain. No noted edema.  Patient is currently prescribed the followin. Cardizem 300 mg take one capsule by mouth daily.       Patient verbalizes taking all medications as prescribed. Blood pressure assessed manually with result of 122/70, Pulse 62. Home blood pressure log submitted for review.Patient states he is having difficulty controlling salt intake. States his wife is unable to cook regularly and he has to eat out frequently.  Patient advised to continue medication as currently prescribed, follow low sodium diet, and exercise daily as tolerated.

## 2019-06-30 ENCOUNTER — EXTERNAL CHRONIC CARE MANAGEMENT (OUTPATIENT)
Dept: PRIMARY CARE CLINIC | Facility: CLINIC | Age: 79
End: 2019-06-30
Payer: MEDICARE

## 2019-06-30 PROCEDURE — 99490 PR CHRONIC CARE MGMT, 1ST 20 MIN: ICD-10-PCS | Mod: S$PBB,,, | Performed by: FAMILY MEDICINE

## 2019-06-30 PROCEDURE — 99490 CHRNC CARE MGMT STAFF 1ST 20: CPT | Mod: S$PBB,,, | Performed by: FAMILY MEDICINE

## 2019-06-30 PROCEDURE — 99490 CHRNC CARE MGMT STAFF 1ST 20: CPT | Mod: PBBFAC,PO | Performed by: FAMILY MEDICINE

## 2019-07-31 ENCOUNTER — EXTERNAL CHRONIC CARE MANAGEMENT (OUTPATIENT)
Dept: PRIMARY CARE CLINIC | Facility: CLINIC | Age: 79
End: 2019-07-31
Payer: MEDICARE

## 2019-07-31 PROCEDURE — 99490 CHRNC CARE MGMT STAFF 1ST 20: CPT | Mod: S$PBB,,, | Performed by: FAMILY MEDICINE

## 2019-07-31 PROCEDURE — 99490 CHRNC CARE MGMT STAFF 1ST 20: CPT | Mod: PBBFAC,PO | Performed by: FAMILY MEDICINE

## 2019-07-31 PROCEDURE — 99490 PR CHRONIC CARE MGMT, 1ST 20 MIN: ICD-10-PCS | Mod: S$PBB,,, | Performed by: FAMILY MEDICINE

## 2019-08-31 ENCOUNTER — EXTERNAL CHRONIC CARE MANAGEMENT (OUTPATIENT)
Dept: PRIMARY CARE CLINIC | Facility: CLINIC | Age: 79
End: 2019-08-31
Payer: MEDICARE

## 2019-08-31 PROCEDURE — 99490 PR CHRONIC CARE MGMT, 1ST 20 MIN: ICD-10-PCS | Mod: S$PBB,,, | Performed by: FAMILY MEDICINE

## 2019-08-31 PROCEDURE — 99490 CHRNC CARE MGMT STAFF 1ST 20: CPT | Mod: S$PBB,,, | Performed by: FAMILY MEDICINE

## 2019-08-31 PROCEDURE — 99490 CHRNC CARE MGMT STAFF 1ST 20: CPT | Mod: PBBFAC,PO | Performed by: FAMILY MEDICINE

## 2019-09-12 RX ORDER — SIMVASTATIN 40 MG/1
20 TABLET, FILM COATED ORAL NIGHTLY
Qty: 90 TABLET | Refills: 4 | Status: SHIPPED | OUTPATIENT
Start: 2019-09-12 | End: 2019-12-04 | Stop reason: SINTOL

## 2019-09-30 ENCOUNTER — EXTERNAL CHRONIC CARE MANAGEMENT (OUTPATIENT)
Dept: PRIMARY CARE CLINIC | Facility: CLINIC | Age: 79
End: 2019-09-30
Payer: MEDICARE

## 2019-09-30 PROCEDURE — 99490 CHRNC CARE MGMT STAFF 1ST 20: CPT | Mod: S$PBB,,, | Performed by: FAMILY MEDICINE

## 2019-09-30 PROCEDURE — 99490 CHRNC CARE MGMT STAFF 1ST 20: CPT | Mod: PBBFAC,PO | Performed by: FAMILY MEDICINE

## 2019-09-30 PROCEDURE — 99490 PR CHRONIC CARE MGMT, 1ST 20 MIN: ICD-10-PCS | Mod: S$PBB,,, | Performed by: FAMILY MEDICINE

## 2019-10-03 ENCOUNTER — LAB VISIT (OUTPATIENT)
Dept: LAB | Facility: HOSPITAL | Age: 79
End: 2019-10-03
Attending: FAMILY MEDICINE
Payer: MEDICARE

## 2019-10-03 DIAGNOSIS — E11.9 DIABETES MELLITUS WITHOUT COMPLICATION: ICD-10-CM

## 2019-10-03 DIAGNOSIS — E11.69 HYPERLIPIDEMIA ASSOCIATED WITH TYPE 2 DIABETES MELLITUS: ICD-10-CM

## 2019-10-03 DIAGNOSIS — E78.5 HYPERLIPIDEMIA ASSOCIATED WITH TYPE 2 DIABETES MELLITUS: ICD-10-CM

## 2019-10-03 DIAGNOSIS — I10 HYPERTENSION, WELL CONTROLLED: ICD-10-CM

## 2019-10-03 LAB
ALBUMIN SERPL BCP-MCNC: 3.8 G/DL (ref 3.5–5.2)
ALP SERPL-CCNC: 57 U/L (ref 55–135)
ALT SERPL W/O P-5'-P-CCNC: 73 U/L (ref 10–44)
ANION GAP SERPL CALC-SCNC: 8 MMOL/L (ref 8–16)
AST SERPL-CCNC: 54 U/L (ref 10–40)
BASOPHILS # BLD AUTO: 0.03 K/UL (ref 0–0.2)
BASOPHILS NFR BLD: 0.4 % (ref 0–1.9)
BILIRUB SERPL-MCNC: 0.6 MG/DL (ref 0.1–1)
BUN SERPL-MCNC: 13 MG/DL (ref 8–23)
CALCIUM SERPL-MCNC: 8.9 MG/DL (ref 8.7–10.5)
CHLORIDE SERPL-SCNC: 104 MMOL/L (ref 95–110)
CHOLEST SERPL-MCNC: 109 MG/DL (ref 120–199)
CHOLEST/HDLC SERPL: 2.5 {RATIO} (ref 2–5)
CO2 SERPL-SCNC: 25 MMOL/L (ref 23–29)
CREAT SERPL-MCNC: 1.2 MG/DL (ref 0.5–1.4)
DIFFERENTIAL METHOD: ABNORMAL
EOSINOPHIL # BLD AUTO: 0.6 K/UL (ref 0–0.5)
EOSINOPHIL NFR BLD: 7.6 % (ref 0–8)
ERYTHROCYTE [DISTWIDTH] IN BLOOD BY AUTOMATED COUNT: 13.8 % (ref 11.5–14.5)
EST. GFR  (AFRICAN AMERICAN): >60 ML/MIN/1.73 M^2
EST. GFR  (NON AFRICAN AMERICAN): 56.4 ML/MIN/1.73 M^2
ESTIMATED AVG GLUCOSE: 137 MG/DL (ref 68–131)
GLUCOSE SERPL-MCNC: 109 MG/DL (ref 70–110)
HBA1C MFR BLD HPLC: 6.4 % (ref 4–5.6)
HCT VFR BLD AUTO: 47.6 % (ref 40–54)
HDLC SERPL-MCNC: 44 MG/DL (ref 40–75)
HDLC SERPL: 40.4 % (ref 20–50)
HGB BLD-MCNC: 15.1 G/DL (ref 14–18)
IMM GRANULOCYTES # BLD AUTO: 0.01 K/UL (ref 0–0.04)
IMM GRANULOCYTES NFR BLD AUTO: 0.1 % (ref 0–0.5)
LDLC SERPL CALC-MCNC: 51.2 MG/DL (ref 63–159)
LYMPHOCYTES # BLD AUTO: 2.2 K/UL (ref 1–4.8)
LYMPHOCYTES NFR BLD: 30.1 % (ref 18–48)
MCH RBC QN AUTO: 32.4 PG (ref 27–31)
MCHC RBC AUTO-ENTMCNC: 31.7 G/DL (ref 32–36)
MCV RBC AUTO: 102 FL (ref 82–98)
MONOCYTES # BLD AUTO: 0.6 K/UL (ref 0.3–1)
MONOCYTES NFR BLD: 8.2 % (ref 4–15)
NEUTROPHILS # BLD AUTO: 4 K/UL (ref 1.8–7.7)
NEUTROPHILS NFR BLD: 53.6 % (ref 38–73)
NONHDLC SERPL-MCNC: 65 MG/DL
NRBC BLD-RTO: 0 /100 WBC
PLATELET # BLD AUTO: 222 K/UL (ref 150–350)
PMV BLD AUTO: 10.8 FL (ref 9.2–12.9)
POTASSIUM SERPL-SCNC: 4 MMOL/L (ref 3.5–5.1)
PROT SERPL-MCNC: 6.7 G/DL (ref 6–8.4)
RBC # BLD AUTO: 4.66 M/UL (ref 4.6–6.2)
SODIUM SERPL-SCNC: 137 MMOL/L (ref 136–145)
TRIGL SERPL-MCNC: 69 MG/DL (ref 30–150)
WBC # BLD AUTO: 7.41 K/UL (ref 3.9–12.7)

## 2019-10-03 PROCEDURE — 36415 COLL VENOUS BLD VENIPUNCTURE: CPT | Mod: PO

## 2019-10-03 PROCEDURE — 80053 COMPREHEN METABOLIC PANEL: CPT

## 2019-10-03 PROCEDURE — 85025 COMPLETE CBC W/AUTO DIFF WBC: CPT

## 2019-10-03 PROCEDURE — 83036 HEMOGLOBIN GLYCOSYLATED A1C: CPT

## 2019-10-03 PROCEDURE — 80061 LIPID PANEL: CPT

## 2019-10-22 ENCOUNTER — OFFICE VISIT (OUTPATIENT)
Dept: HOME HEALTH SERVICES | Facility: CLINIC | Age: 79
End: 2019-10-22
Payer: MEDICARE

## 2019-10-22 VITALS
TEMPERATURE: 98 F | RESPIRATION RATE: 16 BRPM | WEIGHT: 212 LBS | HEART RATE: 79 BPM | OXYGEN SATURATION: 98 % | BODY MASS INDEX: 28.1 KG/M2 | DIASTOLIC BLOOD PRESSURE: 78 MMHG | HEIGHT: 73 IN | SYSTOLIC BLOOD PRESSURE: 156 MMHG

## 2019-10-22 DIAGNOSIS — E11.42 DIABETIC POLYNEUROPATHY ASSOCIATED WITH TYPE 2 DIABETES MELLITUS: ICD-10-CM

## 2019-10-22 DIAGNOSIS — I12.9 HYPERTENSIVE RENAL DISEASE: ICD-10-CM

## 2019-10-22 DIAGNOSIS — I34.0 NON-RHEUMATIC MITRAL REGURGITATION: ICD-10-CM

## 2019-10-22 DIAGNOSIS — Z85.828 HISTORY OF SKIN CANCER IN ADULTHOOD: ICD-10-CM

## 2019-10-22 DIAGNOSIS — K21.9 GASTROESOPHAGEAL REFLUX DISEASE WITHOUT ESOPHAGITIS: ICD-10-CM

## 2019-10-22 DIAGNOSIS — M21.942 HAND DEFORMITY, ACQUIRED, LEFT: ICD-10-CM

## 2019-10-22 DIAGNOSIS — E11.22 TYPE 2 DIABETES MELLITUS WITH STAGE 3 CHRONIC KIDNEY DISEASE, WITHOUT LONG-TERM CURRENT USE OF INSULIN: ICD-10-CM

## 2019-10-22 DIAGNOSIS — N18.30 CHRONIC KIDNEY DISEASE, STAGE III (MODERATE): Primary | ICD-10-CM

## 2019-10-22 DIAGNOSIS — I48.20 CHRONIC ATRIAL FIBRILLATION: ICD-10-CM

## 2019-10-22 DIAGNOSIS — N18.30 TYPE 2 DIABETES MELLITUS WITH STAGE 3 CHRONIC KIDNEY DISEASE, WITHOUT LONG-TERM CURRENT USE OF INSULIN: ICD-10-CM

## 2019-10-22 DIAGNOSIS — E11.69 HYPERLIPIDEMIA ASSOCIATED WITH TYPE 2 DIABETES MELLITUS: ICD-10-CM

## 2019-10-22 DIAGNOSIS — E78.5 HYPERLIPIDEMIA ASSOCIATED WITH TYPE 2 DIABETES MELLITUS: ICD-10-CM

## 2019-10-22 DIAGNOSIS — Z00.00 ENCOUNTER FOR PREVENTIVE HEALTH EXAMINATION: ICD-10-CM

## 2019-10-22 DIAGNOSIS — Z79.01 LONG TERM (CURRENT) USE OF ANTICOAGULANTS: ICD-10-CM

## 2019-10-22 PROCEDURE — G0439 PR MEDICARE ANNUAL WELLNESS SUBSEQUENT VISIT: ICD-10-PCS | Mod: S$GLB,,, | Performed by: NURSE PRACTITIONER

## 2019-10-22 PROCEDURE — G0439 PPPS, SUBSEQ VISIT: HCPCS | Mod: S$GLB,,, | Performed by: NURSE PRACTITIONER
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
## 2019-10-22 NOTE — PATIENT INSTRUCTIONS
Counseling and Referral of Other Preventative  (Italic type indicates deductible and co-insurance are waived)    Patient Name: Domitila Walters  Today's Date: 10/22/2019    Health Maintenance       Date Due Completion Date    Shingles Vaccine (1 of 2) 03/03/1988 ---    Pneumococcal Vaccine (65+ Low/Medium Risk) (2 of 2 - PCV13) 01/05/2019 1/5/2018    Eye Exam 06/04/2019 6/4/2018    Influenza Vaccine (1) 09/01/2019 9/25/2018    Urine Microalbumin 01/08/2020 1/8/2019    Hemoglobin A1c 04/03/2020 10/3/2019    Foot Exam 04/24/2020 4/24/2019    Lipid Panel 10/03/2020 10/3/2019    TETANUS VACCINE 07/13/2027 7/13/2017        No orders of the defined types were placed in this encounter.    The following information is provided to all patients.  This information is to help you find resources for any of the problems found today that may be affecting your health:                Living healthy guide: www.[facility].[place].[facility]      Understanding Diabetes: www.diabetes.org      Eating healthy: www.cdc.gov/healthyweight      [facility] home safety checklist: www.cdc.gov/steadi/patient.html      Agency on Aging: www.goea.[place].[facility]      Alcoholics anonymous (AA): www.aa.org      Physical Activity: www.marj.nih.gov/qs1moqw      Tobacco use: www.quitwithusla.org     Chronic Kidney Disease (CKD)     The role of the kidneys is to remove waste products and extra water from the blood.  When the kidneys do not work as they should, waste products begin to build up in the blood. This is called chronic kidney disease (CKD). CKD means that you have kidney damage or a decrease in kidney function lasting at least 3 months. CKD allows extra water, waste, and toxins to build up in the body. This can eventually become life-threatening. You might need dialysis or a kidney transplant to stay alive. This most severe form is called end stage renal disease.  Diabetes is the leading causes of chronic renal failure. Other causes include high blood pressure,  hardening of the arteries (atherosclerosis), lupus, inflammation of the blood vessels (vasculitis), and past viral or bacterial infections. Certain over-the-counter pain medicines can cause renal failure when taken often over a long period of time. These include aspirin, ibuprofen, and related anti-inflammatory medicines called NSAIDs (nonsteroidal anti-inflammatory drugs).  Home care  The following guidelines will help you care for yourself at home:  · If you have diabetes, talk with your healthcare provider about keeping your blood sugar under control. Ask if you need to make and changes to your diet, lifestyle, or medicines.  · If you have high blood pressure:  ¨ Take prescribed medicine to lower your blood pressure to the recommended goal of less than 130/80.  ¨ Start a regular exercise program that you enjoy. Check with your healthcare provider to be sure your planned exercise program is right for you.  ¨ Eat less salt (sodium). Your healthcare provider can tell you how much salt per day is safe for you.  · If you are overweight, talk with your healthcare provider about a weight loss plan.  · If you smoke, you must quit. Smoking makes kidney disease worse. Talk with your healthcare provider about ways to help you quit.  For more information, visit the following links:  ¨ www.smokefree.gov/sites/default/files/pdf/clearing-the-air-accessible.pdf  ¨ www.smokefree.gov  ¨ www.cancer.org/healthy/stayawayfromtobacco/guidetoquittingsmoking/  · Most people with CKD need to follow a special diet.  Be sure you understand yours. In general, you will need to limit protein, salt, potassium, and phosphorus. You also need to limit how much fluid you drink.   · CKD is a risk factor for heart disease. Talk with your healthcare provider about any other risk factors you might have and what you can do to lessen them.  · Talk with your healthcare provider about any medicines you are taking to find out if they need to be reduced or  stopped.  · Don't use the following over-the-counter medicines, or consult your healthcare provider before using:  ¨ Aspirin and NSAIDs such as ibuprofen or naproxen. Using acetaminophen for fever or pain is OK.  ¨ Laxatives and antacids containing magnesium or aluminum  ¨ Fleet or phospho soda enemas containing phosphorus  ¨ Certain stomach acid-blocking medicine such as cimetidine or ranitidine   ¨ Decongestants containing pseudoephedrine   ¨ Herbal supplements  Follow-up care  Follow up with your healthcare provider, or as advised. Contact one of the following for more information:  · American Association of Kidney Patients 443-533-3776 www.aakp.org  · National Kidney Foundation 309-237-9591 www.kidney.org  · American Kidney Fund 955-479-4901 www.kidneyfund.org  · National Kidney Disease Education Program 866-4KIDNEY www.nkdep.nih.gov  If an X-ray, ECG (cardiogram), or other diagnostic test was taken, you will be told of any new findings that may affect your care.  Call 911  Call 911 if you have any of the following:  · Severe weakness, dizziness, fainting, drowsiness, or confusion  · Chest pain or shortness of breath  · Heart beating fast, slow, or irregularly  When to seek medical advice  Call your healthcare provider right away if any of these occur:  · Nausea or vomiting  · Fever of 100.4°F (38°C) or higher, or as directed by your healthcare provider  · Unexpected weight gain or swelling in the legs, ankles, or around the eyes  · Decrease or absent urine output  Date Last Reviewed: 9/1/2016 © 2000-2017 [employer]. [address] [place] [address], [place] [address]. All rights reserved. This information is not intended as a substitute for professional medical care. Always follow your healthcare professional's instructions.

## 2019-10-31 ENCOUNTER — EXTERNAL CHRONIC CARE MANAGEMENT (OUTPATIENT)
Dept: PRIMARY CARE CLINIC | Facility: CLINIC | Age: 79
End: 2019-10-31
Payer: MEDICARE

## 2019-10-31 PROCEDURE — 99490 CHRNC CARE MGMT STAFF 1ST 20: CPT | Mod: S$PBB,,, | Performed by: FAMILY MEDICINE

## 2019-10-31 PROCEDURE — 99490 PR CHRONIC CARE MGMT, 1ST 20 MIN: ICD-10-PCS | Mod: S$PBB,,, | Performed by: FAMILY MEDICINE

## 2019-10-31 PROCEDURE — 99490 CHRNC CARE MGMT STAFF 1ST 20: CPT | Mod: PBBFAC,PO | Performed by: FAMILY MEDICINE

## 2019-11-30 ENCOUNTER — EXTERNAL CHRONIC CARE MANAGEMENT (OUTPATIENT)
Dept: PRIMARY CARE CLINIC | Facility: CLINIC | Age: 79
End: 2019-11-30
Payer: MEDICARE

## 2019-11-30 PROCEDURE — 99490 PR CHRONIC CARE MGMT, 1ST 20 MIN: ICD-10-PCS | Mod: S$PBB,,, | Performed by: FAMILY MEDICINE

## 2019-11-30 PROCEDURE — 99490 CHRNC CARE MGMT STAFF 1ST 20: CPT | Mod: S$PBB,,, | Performed by: FAMILY MEDICINE

## 2019-11-30 PROCEDURE — 99490 CHRNC CARE MGMT STAFF 1ST 20: CPT | Mod: PBBFAC,PO | Performed by: FAMILY MEDICINE

## 2019-12-04 DIAGNOSIS — R74.01 ELEVATED TRANSAMINASE LEVEL: Primary | ICD-10-CM

## 2019-12-04 RX ORDER — PRAVASTATIN SODIUM 40 MG/1
40 TABLET ORAL DAILY
Qty: 90 TABLET | Refills: 3 | Status: SHIPPED | OUTPATIENT
Start: 2019-12-04 | End: 2020-08-27 | Stop reason: SDUPTHER

## 2019-12-05 ENCOUNTER — TELEPHONE (OUTPATIENT)
Dept: FAMILY MEDICINE | Facility: CLINIC | Age: 79
End: 2019-12-05

## 2019-12-06 RX ORDER — DILTIAZEM HYDROCHLORIDE 300 MG/1
CAPSULE, COATED, EXTENDED RELEASE ORAL
Qty: 90 CAPSULE | Refills: 11 | Status: SHIPPED | OUTPATIENT
Start: 2019-12-06 | End: 2021-02-18

## 2019-12-31 ENCOUNTER — EXTERNAL CHRONIC CARE MANAGEMENT (OUTPATIENT)
Dept: PRIMARY CARE CLINIC | Facility: CLINIC | Age: 79
End: 2019-12-31
Payer: MEDICARE

## 2019-12-31 PROCEDURE — 99490 CHRNC CARE MGMT STAFF 1ST 20: CPT | Mod: PBBFAC,PO | Performed by: FAMILY MEDICINE

## 2019-12-31 PROCEDURE — 99490 CHRNC CARE MGMT STAFF 1ST 20: CPT | Mod: S$PBB,,, | Performed by: FAMILY MEDICINE

## 2019-12-31 PROCEDURE — 99490 PR CHRONIC CARE MGMT, 1ST 20 MIN: ICD-10-PCS | Mod: S$PBB,,, | Performed by: FAMILY MEDICINE

## 2020-01-27 PROBLEM — Z00.00 ENCOUNTER FOR PREVENTIVE HEALTH EXAMINATION: Status: RESOLVED | Noted: 2019-10-22 | Resolved: 2020-01-27

## 2020-01-31 ENCOUNTER — EXTERNAL CHRONIC CARE MANAGEMENT (OUTPATIENT)
Dept: PRIMARY CARE CLINIC | Facility: CLINIC | Age: 80
End: 2020-01-31
Payer: MEDICARE

## 2020-01-31 PROCEDURE — 99490 CHRNC CARE MGMT STAFF 1ST 20: CPT | Mod: S$PBB,,, | Performed by: FAMILY MEDICINE

## 2020-01-31 PROCEDURE — 99490 PR CHRONIC CARE MGMT, 1ST 20 MIN: ICD-10-PCS | Mod: S$PBB,,, | Performed by: FAMILY MEDICINE

## 2020-01-31 PROCEDURE — 99490 CHRNC CARE MGMT STAFF 1ST 20: CPT | Mod: PBBFAC,PO | Performed by: FAMILY MEDICINE

## 2020-02-29 ENCOUNTER — EXTERNAL CHRONIC CARE MANAGEMENT (OUTPATIENT)
Dept: PRIMARY CARE CLINIC | Facility: CLINIC | Age: 80
End: 2020-02-29
Payer: MEDICARE

## 2020-02-29 PROCEDURE — 99490 CHRNC CARE MGMT STAFF 1ST 20: CPT | Mod: PBBFAC,PO | Performed by: FAMILY MEDICINE

## 2020-02-29 PROCEDURE — 99490 PR CHRONIC CARE MGMT, 1ST 20 MIN: ICD-10-PCS | Mod: S$PBB,,, | Performed by: FAMILY MEDICINE

## 2020-02-29 PROCEDURE — 99490 CHRNC CARE MGMT STAFF 1ST 20: CPT | Mod: S$PBB,,, | Performed by: FAMILY MEDICINE

## 2020-03-02 DIAGNOSIS — M79.2 PERIPHERAL NEUROPATHIC PAIN: ICD-10-CM

## 2020-03-02 RX ORDER — FOLIC ACID-PYRIDOXINE-CYANOCOBALAMIN TAB 2.5-25-2 MG 2.5-25-2 MG
TAB ORAL
Qty: 90 TABLET | Refills: 4 | Status: SHIPPED | OUTPATIENT
Start: 2020-03-02 | End: 2021-02-18

## 2020-03-04 DIAGNOSIS — E11.59 CONTROLLED TYPE 2 DIABETES MELLITUS WITH OTHER CIRCULATORY COMPLICATION, WITHOUT LONG-TERM CURRENT USE OF INSULIN: ICD-10-CM

## 2020-03-05 RX ORDER — REPAGLINIDE 1 MG/1
TABLET ORAL
Qty: 180 TABLET | Refills: 0 | Status: SHIPPED | OUTPATIENT
Start: 2020-03-05 | End: 2020-06-02

## 2020-03-31 ENCOUNTER — EXTERNAL CHRONIC CARE MANAGEMENT (OUTPATIENT)
Dept: PRIMARY CARE CLINIC | Facility: CLINIC | Age: 80
End: 2020-03-31
Payer: MEDICARE

## 2020-03-31 PROCEDURE — 99490 PR CHRONIC CARE MGMT, 1ST 20 MIN: ICD-10-PCS | Mod: S$PBB,,, | Performed by: FAMILY MEDICINE

## 2020-03-31 PROCEDURE — 99490 CHRNC CARE MGMT STAFF 1ST 20: CPT | Mod: PBBFAC,PO | Performed by: FAMILY MEDICINE

## 2020-03-31 PROCEDURE — 99490 CHRNC CARE MGMT STAFF 1ST 20: CPT | Mod: S$PBB,,, | Performed by: FAMILY MEDICINE

## 2020-04-30 ENCOUNTER — EXTERNAL CHRONIC CARE MANAGEMENT (OUTPATIENT)
Dept: PRIMARY CARE CLINIC | Facility: CLINIC | Age: 80
End: 2020-04-30
Payer: MEDICARE

## 2020-04-30 PROCEDURE — 99490 PR CHRONIC CARE MGMT, 1ST 20 MIN: ICD-10-PCS | Mod: S$PBB,,, | Performed by: FAMILY MEDICINE

## 2020-04-30 PROCEDURE — 99490 CHRNC CARE MGMT STAFF 1ST 20: CPT | Mod: S$PBB,,, | Performed by: FAMILY MEDICINE

## 2020-04-30 PROCEDURE — 99490 CHRNC CARE MGMT STAFF 1ST 20: CPT | Mod: PBBFAC,PO | Performed by: FAMILY MEDICINE

## 2020-05-05 ENCOUNTER — PATIENT MESSAGE (OUTPATIENT)
Dept: ADMINISTRATIVE | Facility: HOSPITAL | Age: 80
End: 2020-05-05

## 2020-05-26 DIAGNOSIS — I48.20 CHRONIC ATRIAL FIBRILLATION: ICD-10-CM

## 2020-05-31 ENCOUNTER — EXTERNAL CHRONIC CARE MANAGEMENT (OUTPATIENT)
Dept: PRIMARY CARE CLINIC | Facility: CLINIC | Age: 80
End: 2020-05-31
Payer: MEDICARE

## 2020-05-31 PROCEDURE — 99490 CHRNC CARE MGMT STAFF 1ST 20: CPT | Mod: PBBFAC,PO | Performed by: FAMILY MEDICINE

## 2020-05-31 PROCEDURE — 99490 PR CHRONIC CARE MGMT, 1ST 20 MIN: ICD-10-PCS | Mod: S$PBB,,, | Performed by: FAMILY MEDICINE

## 2020-05-31 PROCEDURE — 99490 CHRNC CARE MGMT STAFF 1ST 20: CPT | Mod: S$PBB,,, | Performed by: FAMILY MEDICINE

## 2020-06-02 ENCOUNTER — OFFICE VISIT (OUTPATIENT)
Dept: FAMILY MEDICINE | Facility: CLINIC | Age: 80
End: 2020-06-02
Payer: MEDICARE

## 2020-06-02 ENCOUNTER — CLINICAL SUPPORT (OUTPATIENT)
Dept: FAMILY MEDICINE | Facility: CLINIC | Age: 80
End: 2020-06-02
Payer: MEDICARE

## 2020-06-02 ENCOUNTER — LAB VISIT (OUTPATIENT)
Dept: LAB | Facility: HOSPITAL | Age: 80
End: 2020-06-02
Attending: FAMILY MEDICINE
Payer: MEDICARE

## 2020-06-02 VITALS
SYSTOLIC BLOOD PRESSURE: 138 MMHG | WEIGHT: 206.81 LBS | HEIGHT: 73 IN | DIASTOLIC BLOOD PRESSURE: 76 MMHG | TEMPERATURE: 98 F | OXYGEN SATURATION: 96 % | HEART RATE: 56 BPM | BODY MASS INDEX: 27.41 KG/M2

## 2020-06-02 DIAGNOSIS — E11.59 CONTROLLED TYPE 2 DIABETES MELLITUS WITH OTHER CIRCULATORY COMPLICATION, WITHOUT LONG-TERM CURRENT USE OF INSULIN: ICD-10-CM

## 2020-06-02 DIAGNOSIS — I10 HYPERTENSION, WELL CONTROLLED: ICD-10-CM

## 2020-06-02 DIAGNOSIS — I48.20 CHRONIC ATRIAL FIBRILLATION: ICD-10-CM

## 2020-06-02 DIAGNOSIS — E11.8 TYPE 2 DIABETES MELLITUS WITH COMPLICATION, WITHOUT LONG-TERM CURRENT USE OF INSULIN: ICD-10-CM

## 2020-06-02 DIAGNOSIS — E11.8 TYPE 2 DIABETES MELLITUS WITH COMPLICATION, WITHOUT LONG-TERM CURRENT USE OF INSULIN: Primary | ICD-10-CM

## 2020-06-02 PROCEDURE — 99214 PR OFFICE/OUTPT VISIT, EST, LEVL IV, 30-39 MIN: ICD-10-PCS | Mod: S$PBB,,, | Performed by: FAMILY MEDICINE

## 2020-06-02 PROCEDURE — 83036 HEMOGLOBIN GLYCOSYLATED A1C: CPT

## 2020-06-02 PROCEDURE — 92250 DIABETIC EYE SCREENING PHOTO: ICD-10-PCS | Mod: 26,S$PBB,, | Performed by: OPTOMETRIST

## 2020-06-02 PROCEDURE — 92250 FUNDUS PHOTOGRAPHY W/I&R: CPT | Mod: 26,S$PBB,, | Performed by: OPTOMETRIST

## 2020-06-02 PROCEDURE — 36415 COLL VENOUS BLD VENIPUNCTURE: CPT | Mod: PO

## 2020-06-02 PROCEDURE — 92250 FUNDUS PHOTOGRAPHY W/I&R: CPT | Mod: PBBFAC,PO

## 2020-06-02 PROCEDURE — 99999 PR PBB SHADOW E&M-EST. PATIENT-LVL IV: CPT | Mod: PBBFAC,,, | Performed by: FAMILY MEDICINE

## 2020-06-02 PROCEDURE — 99214 OFFICE O/P EST MOD 30 MIN: CPT | Mod: PBBFAC,PO,25 | Performed by: FAMILY MEDICINE

## 2020-06-02 PROCEDURE — 80061 LIPID PANEL: CPT

## 2020-06-02 PROCEDURE — 99211 OFF/OP EST MAY X REQ PHY/QHP: CPT | Mod: PBBFAC,27,PO

## 2020-06-02 PROCEDURE — 99214 OFFICE O/P EST MOD 30 MIN: CPT | Mod: S$PBB,,, | Performed by: FAMILY MEDICINE

## 2020-06-02 PROCEDURE — 99999 PR PBB SHADOW E&M-EST. PATIENT-LVL I: CPT | Mod: PBBFAC,,,

## 2020-06-02 PROCEDURE — 99999 PR PBB SHADOW E&M-EST. PATIENT-LVL IV: ICD-10-PCS | Mod: PBBFAC,,, | Performed by: FAMILY MEDICINE

## 2020-06-02 PROCEDURE — 80048 BASIC METABOLIC PNL TOTAL CA: CPT

## 2020-06-02 PROCEDURE — 99999 PR PBB SHADOW E&M-EST. PATIENT-LVL I: ICD-10-PCS | Mod: PBBFAC,,,

## 2020-06-02 RX ORDER — REPAGLINIDE 1 MG/1
TABLET ORAL
Qty: 180 TABLET | Refills: 0
Start: 2020-06-02 | End: 2020-06-08

## 2020-06-02 RX ORDER — PHENOL/SODIUM PHENOLATE
20 AEROSOL, SPRAY (ML) MUCOUS MEMBRANE DAILY
COMMUNITY
Start: 2020-06-02 | End: 2020-12-02

## 2020-06-02 NOTE — PROGRESS NOTES
Domitila Walters is a 80 y.o. male here for a diabetic eye screening with non-dilated fundus photos per Dr. Crespo    Patient cooperative YES  Small pupils Yes  Last eye exam: 6/4/18    For exam results, see Encounter Report.

## 2020-06-02 NOTE — PATIENT INSTRUCTIONS

## 2020-06-03 LAB
ANION GAP SERPL CALC-SCNC: 8 MMOL/L (ref 8–16)
BUN SERPL-MCNC: 13 MG/DL (ref 8–23)
CALCIUM SERPL-MCNC: 9.3 MG/DL (ref 8.7–10.5)
CHLORIDE SERPL-SCNC: 104 MMOL/L (ref 95–110)
CHOLEST SERPL-MCNC: 172 MG/DL (ref 120–199)
CHOLEST/HDLC SERPL: 3.4 {RATIO} (ref 2–5)
CO2 SERPL-SCNC: 27 MMOL/L (ref 23–29)
CREAT SERPL-MCNC: 1.2 MG/DL (ref 0.5–1.4)
EST. GFR  (AFRICAN AMERICAN): >60 ML/MIN/1.73 M^2
EST. GFR  (NON AFRICAN AMERICAN): 56 ML/MIN/1.73 M^2
ESTIMATED AVG GLUCOSE: 114 MG/DL (ref 68–131)
GLUCOSE SERPL-MCNC: 64 MG/DL (ref 70–110)
HBA1C MFR BLD HPLC: 5.6 % (ref 4–5.6)
HDLC SERPL-MCNC: 50 MG/DL (ref 40–75)
HDLC SERPL: 29.1 % (ref 20–50)
LDLC SERPL CALC-MCNC: 98.8 MG/DL (ref 63–159)
NONHDLC SERPL-MCNC: 122 MG/DL
POTASSIUM SERPL-SCNC: 4.9 MMOL/L (ref 3.5–5.1)
SODIUM SERPL-SCNC: 139 MMOL/L (ref 136–145)
TRIGL SERPL-MCNC: 116 MG/DL (ref 30–150)

## 2020-06-08 DIAGNOSIS — E11.59 CONTROLLED TYPE 2 DIABETES MELLITUS WITH OTHER CIRCULATORY COMPLICATION, WITHOUT LONG-TERM CURRENT USE OF INSULIN: ICD-10-CM

## 2020-06-08 DIAGNOSIS — E11.8 TYPE 2 DIABETES MELLITUS WITH COMPLICATION, WITHOUT LONG-TERM CURRENT USE OF INSULIN: ICD-10-CM

## 2020-06-08 RX ORDER — REPAGLINIDE 1 MG/1
TABLET ORAL
Qty: 180 TABLET | Refills: 3 | Status: SHIPPED | OUTPATIENT
Start: 2020-06-08 | End: 2021-05-18

## 2020-06-15 NOTE — PROGRESS NOTES
Subjective:       Patient ID: Domitila Walters is a 80 y.o. male.    Chief Complaint: Annual Exam  HT well controlled. Patient w/out chest ,no dyspnea.  Diabetes II well, BS better controlled.  Family crisis, no anxiety but w/temper . He has poor self controlled. No toxins.  Hypertension  This is a chronic problem. The current episode started more than 1 year ago. The problem has been resolved since onset. The problem is uncontrolled. Pertinent negatives include no chest pain, headaches, neck pain, palpitations or shortness of breath. There are no associated agents to hypertension. Risk factors for coronary artery disease include male gender and sedentary lifestyle. Past treatments include calcium channel blockers. The current treatment provides significant improvement. Compliance problems include diet.      Review of Systems   Constitutional: Negative for activity change, fatigue and unexpected weight change.   HENT: Negative for hearing loss, rhinorrhea and trouble swallowing.    Eyes: Positive for visual disturbance. Negative for discharge.   Respiratory: Negative for chest tightness, shortness of breath and wheezing.    Cardiovascular: Negative for chest pain, palpitations and leg swelling.   Gastrointestinal: Negative for abdominal pain, blood in stool, constipation, diarrhea and vomiting.   Endocrine: Negative for polydipsia and polyuria.   Genitourinary: Positive for urgency. Negative for difficulty urinating and hematuria.   Musculoskeletal: Negative for arthralgias, joint swelling and neck pain.   Neurological: Positive for weakness. Negative for dizziness, syncope, light-headedness and headaches.   Psychiatric/Behavioral: Negative for confusion and dysphoric mood.       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   Constitutional:       General: He is not in acute distress.     Appearance: He is well-developed. He is not diaphoretic.   HENT:      Head: Normocephalic and atraumatic.      Right Ear: External ear normal.      Left Ear: External ear normal.      Nose: Nose normal.      Mouth/Throat:      Pharynx: No oropharyngeal exudate.   Eyes:      General: No scleral icterus.        Right eye: No discharge.         Left eye: No discharge.      Conjunctiva/sclera: Conjunctivae normal.      Pupils: Pupils are equal, round, and reactive to light.   Neck:      Musculoskeletal: Normal range of motion and neck supple.      Thyroid: No thyromegaly.      Vascular: No JVD.      Trachea: No tracheal deviation.   Cardiovascular:      Rate and Rhythm: Normal rate.      Heart sounds: Normal heart sounds. No murmur.   Pulmonary:      Effort: Pulmonary effort is normal. No respiratory distress.      Breath sounds: Normal breath sounds. No wheezing or rales.   Abdominal:      General: Bowel sounds are normal. There is no distension.      Palpations: Abdomen is soft. There is no mass.      Tenderness: There is no abdominal tenderness. There is no guarding or rebound.   Musculoskeletal:         General: No tenderness.      Comments:        Lymphadenopathy:      Cervical: No cervical adenopathy.   Skin:     General: Skin is warm  and dry.      Coloration: Skin is not pale.      Findings: No erythema or rash.   Neurological:      Mental Status: He is alert and oriented to person, place, and time.      Cranial Nerves: No cranial nerve deficit.      Coordination: Coordination normal.   Psychiatric:         Behavior: Behavior normal.         Thought Content: Thought content normal.         Judgment: Judgment normal.         Lab Results   Component Value Date    WBC 7.41 10/03/2019    HGB 15.1 10/03/2019    HCT 47.6 10/03/2019     10/03/2019    CHOL 172 06/02/2020    TRIG 116 06/02/2020    HDL 50 06/02/2020    ALT 73 (H) 10/03/2019    AST 54 (H) 10/03/2019     06/02/2020    K 4.9 06/02/2020     06/02/2020    CREATININE 1.2 06/02/2020    BUN 13 06/02/2020    CO2 27 06/02/2020    TSH 1.462 05/31/2018    PSA 2.2 01/29/2016    INR 1.0 09/28/2017    HGBA1C 5.6 06/02/2020     The ASCVD Risk score (Onur DC Jr., et al., 2013) failed to calculate for the following reasons:    The 2013 ASCVD risk score is only valid for ages 40 to 79    Assessment:       1. Type 2 diabetes mellitus with complication, without long-term current use of insulin    2. BMI 28.0-28.9,adult    3. Chronic atrial fibrillation    4. Controlled type 2 diabetes mellitus with other circulatory complication, without long-term current use of insulin    5. Hypertension, well controlled        Plan:       Type 2 diabetes mellitus with complication, without long-term current use of insulin  -     Microalbumin/creatinine urine ratio; Future; Expected date: 06/02/2020  -     Diabetic Eye Screening Photo; Future  -     Basic metabolic panel; Future; Expected date: 06/02/2020  -     Lipid Panel; Future; Expected date: 06/02/2020  -     Hemoglobin A1C; Future; Expected date: 06/02/2020  -     Discontinue: repaglinide (PRANDIN) 1 MG tablet; 1/2 tab ac lunch 1 tab ac dinner  Dispense: 180 tablet; Refill: 0  -     Ambulatory referral/consult to Ophthalmology; Future; Expected  date: 06/09/2020  -     Hypertension Digital Medicine (HDMP) Enrollment Order  -     Diabetes Digital Medicine (DDMP) Enrollment Order  -     Diabetes Digital Medicine (DDMP): Assign Onboarding Questionnaires    BMI 28.0-28.9,adult    Chronic atrial fibrillation  -     Basic metabolic panel; Future; Expected date: 06/02/2020    Controlled type 2 diabetes mellitus with other circulatory complication, without long-term current use of insulin  -     Discontinue: repaglinide (PRANDIN) 1 MG tablet; 1/2 tab ac lunch 1 tab ac dinner  Dispense: 180 tablet; Refill: 0    Hypertension, well controlled    Other orders  -     omeprazole 20 mg TbEC; Take 20 mg by mouth once daily.      Patient readiness: acceptance and barriers:social stressors    During the course of the visit the patient was educated and counseled about the following:     Diabetes:  Discussed general issues about diabetes pathophysiology and management.  Hypertension:   Screening for causes of secondary hypertension: GFR (chronic kidney disease).  Regular aerobic exercise.  Check blood pressures daily and record.    Goals: Diabetes: Maintain Hemoglobin A1C below 7, Hypertension: Reduce Blood Pressure and Obesity: Reduce calorie intake and BMI    Did patient meet goals/outcomes: Yes    The following self management tools provided: blood pressure log  excercise log    Patient Instructions (the written plan) was given to the patient/family.     Time spent with patient: 25-30 minutes    Barriers to medications present (yes )    Adverse reactions to current medications (yes)    Over the counter medications reviewed (Yes)        40 minutes spent counseling patient on diet, exercise, and weight loss.  This has been fully explained to the patient, who indicates understanding.    Discussed health maintenance guidelines appropriate for age.

## 2020-06-25 ENCOUNTER — PES CALL (OUTPATIENT)
Dept: ADMINISTRATIVE | Facility: CLINIC | Age: 80
End: 2020-06-25

## 2020-06-30 ENCOUNTER — EXTERNAL CHRONIC CARE MANAGEMENT (OUTPATIENT)
Dept: PRIMARY CARE CLINIC | Facility: CLINIC | Age: 80
End: 2020-06-30
Payer: MEDICARE

## 2020-06-30 PROCEDURE — 99490 CHRNC CARE MGMT STAFF 1ST 20: CPT | Mod: S$PBB,,, | Performed by: FAMILY MEDICINE

## 2020-06-30 PROCEDURE — 99490 CHRNC CARE MGMT STAFF 1ST 20: CPT | Mod: PBBFAC,PO | Performed by: FAMILY MEDICINE

## 2020-06-30 PROCEDURE — 99490 PR CHRONIC CARE MGMT, 1ST 20 MIN: ICD-10-PCS | Mod: S$PBB,,, | Performed by: FAMILY MEDICINE

## 2020-07-13 ENCOUNTER — PATIENT OUTREACH (OUTPATIENT)
Dept: ADMINISTRATIVE | Facility: OTHER | Age: 80
End: 2020-07-13

## 2020-07-13 NOTE — PROGRESS NOTES
Chart was reviewed for overdue Proactive [facility] Encounters (KARENA)  topics  Updates were requested from care everywhere

## 2020-07-16 ENCOUNTER — OFFICE VISIT (OUTPATIENT)
Dept: OPTOMETRY | Facility: CLINIC | Age: 80
End: 2020-07-16
Payer: MEDICARE

## 2020-07-16 DIAGNOSIS — Z96.1 PSEUDOPHAKIA: ICD-10-CM

## 2020-07-16 DIAGNOSIS — E11.9 DIABETES MELLITUS WITHOUT OPHTHALMIC MANIFESTATIONS: Primary | ICD-10-CM

## 2020-07-16 DIAGNOSIS — H35.89 RPE MOTTLING OF MACULA: ICD-10-CM

## 2020-07-16 DIAGNOSIS — H52.7 REFRACTIVE ERROR: ICD-10-CM

## 2020-07-16 PROCEDURE — 92015 DETERMINE REFRACTIVE STATE: CPT | Mod: ,,, | Performed by: OPTOMETRIST

## 2020-07-16 PROCEDURE — 92004 PR EYE EXAM, NEW PATIENT,COMPREHESV: ICD-10-PCS | Mod: S$PBB,,, | Performed by: OPTOMETRIST

## 2020-07-16 PROCEDURE — 99999 PR PBB SHADOW E&M-EST. PATIENT-LVL III: CPT | Mod: PBBFAC,,, | Performed by: OPTOMETRIST

## 2020-07-16 PROCEDURE — 99213 OFFICE O/P EST LOW 20 MIN: CPT | Mod: PBBFAC,PO | Performed by: OPTOMETRIST

## 2020-07-16 PROCEDURE — 92015 PR REFRACTION: ICD-10-PCS | Mod: ,,, | Performed by: OPTOMETRIST

## 2020-07-16 PROCEDURE — 92004 COMPRE OPH EXAM NEW PT 1/>: CPT | Mod: S$PBB,,, | Performed by: OPTOMETRIST

## 2020-07-16 PROCEDURE — 99999 PR PBB SHADOW E&M-EST. PATIENT-LVL III: ICD-10-PCS | Mod: PBBFAC,,, | Performed by: OPTOMETRIST

## 2020-07-16 NOTE — PROGRESS NOTES
HPI     Here for Diabetic eye exam. PCP happy with BG control. States he is   noticing problems with small print gradually over the past year. Denies   eye pain  Does not use any eye gtts currently.     Hemoglobin A1C       Date                     Value               Ref Range             Status                06/02/2020               5.6                 4.0 - 5.6 %           Final                  10/03/2019               6.4 (H)             4.0 - 5.6 %           Final                  09/25/2018               6.6 (H)             4.0 - 5.6 %           Final                  Last edited by Briseyda Ibrahim on 7/16/2020  9:09 AM. (History)            Assessment /Plan     For exam results, see Encounter Report.    Diabetes mellitus without ophthalmic manifestations  -     Ambulatory referral/consult to Ophthalmology    Pseudophakia    Refractive error    RPE mottling of macula      DM type 2 w/o ocular retinopathy OU. No CSME. Discussed possible ocular affects of uncontrolled blood sugar with patient. Recommended continued strong blood sugar control and continued care with PCP. Monitor yearly.     S/p cataract extraction with good results. Dispensed updated spectacle Rx. Discussed various spectacle lens options. Discussed adaptation period to new specs.  Demonstrated new spec Rx vs current specs in phoropter with patient satisfaction.    RPE macula mottling OU. Start otc AREDS vitamins, sunglasses outdoors. Pt denies smoking. Monitor yearly, sooner if any changes.      RTC in 1 year for comprehensive eye exam, or sooner prn.

## 2020-07-16 NOTE — LETTER
July 16, 2020      Isiah Crespo MD  [address]  [place] [address]           [facility] - Opt[facility]  [address] [place] [address] [address]  [place] [address]  Phone: [phone]          Patient: Domitila Walters   MR Number: [id number]   YOB: 1940   Date of Visit: 7/16/2020       Dear Dr. Isiah Crespo:    Thank you for referring Domitila Walters to me for evaluation. Attached you will find relevant portions of my assessment and plan of care.    If you have questions, please do not hesitate to call me. I look forward to following Domitila Walters along with you.    Sincerely,    Tye Armas, OD    Enclosure  CC:  No Recipients    If you would like to receive this communication electronically, please contact [email or web address] or [phone] to request more information on [facility] access.    For providers and/or their staff who would like to refer a patient to [facility], please contact us through our one-stop-shop provider referral line, [facility], at [phone].    If you feel you have received this communication in error or would no longer like to receive these types of communications, please e-mail [email or web address]

## 2020-07-31 ENCOUNTER — EXTERNAL CHRONIC CARE MANAGEMENT (OUTPATIENT)
Dept: PRIMARY CARE CLINIC | Facility: CLINIC | Age: 80
End: 2020-07-31
Payer: MEDICARE

## 2020-07-31 PROCEDURE — 99490 PR CHRONIC CARE MGMT, 1ST 20 MIN: ICD-10-PCS | Mod: S$PBB,,, | Performed by: FAMILY MEDICINE

## 2020-07-31 PROCEDURE — 99490 CHRNC CARE MGMT STAFF 1ST 20: CPT | Mod: S$PBB,,, | Performed by: FAMILY MEDICINE

## 2020-07-31 PROCEDURE — 99490 CHRNC CARE MGMT STAFF 1ST 20: CPT | Mod: PBBFAC,PO | Performed by: FAMILY MEDICINE

## 2020-08-26 ENCOUNTER — PES CALL (OUTPATIENT)
Dept: ADMINISTRATIVE | Facility: CLINIC | Age: 80
End: 2020-08-26

## 2020-08-31 ENCOUNTER — EXTERNAL CHRONIC CARE MANAGEMENT (OUTPATIENT)
Dept: PRIMARY CARE CLINIC | Facility: CLINIC | Age: 80
End: 2020-08-31
Payer: MEDICARE

## 2020-08-31 PROCEDURE — 99490 PR CHRONIC CARE MGMT, 1ST 20 MIN: ICD-10-PCS | Mod: S$PBB,,, | Performed by: FAMILY MEDICINE

## 2020-08-31 PROCEDURE — 99490 CHRNC CARE MGMT STAFF 1ST 20: CPT | Mod: PBBFAC,PO | Performed by: FAMILY MEDICINE

## 2020-08-31 PROCEDURE — 99490 CHRNC CARE MGMT STAFF 1ST 20: CPT | Mod: S$PBB,,, | Performed by: FAMILY MEDICINE

## 2020-09-29 ENCOUNTER — PATIENT MESSAGE (OUTPATIENT)
Dept: OTHER | Facility: OTHER | Age: 80
End: 2020-09-29

## 2020-09-30 ENCOUNTER — EXTERNAL CHRONIC CARE MANAGEMENT (OUTPATIENT)
Dept: PRIMARY CARE CLINIC | Facility: CLINIC | Age: 80
End: 2020-09-30
Payer: MEDICARE

## 2020-09-30 PROCEDURE — 99490 CHRNC CARE MGMT STAFF 1ST 20: CPT | Mod: PBBFAC,PO | Performed by: FAMILY MEDICINE

## 2020-09-30 PROCEDURE — 99490 CHRNC CARE MGMT STAFF 1ST 20: CPT | Mod: S$PBB,,, | Performed by: FAMILY MEDICINE

## 2020-09-30 PROCEDURE — 99490 PR CHRONIC CARE MGMT, 1ST 20 MIN: ICD-10-PCS | Mod: S$PBB,,, | Performed by: FAMILY MEDICINE

## 2020-10-16 ENCOUNTER — TELEPHONE (OUTPATIENT)
Dept: FAMILY MEDICINE | Facility: CLINIC | Age: 80
End: 2020-10-16

## 2020-10-16 DIAGNOSIS — E11.22 TYPE 2 DIABETES MELLITUS WITH STAGE 3A CHRONIC KIDNEY DISEASE, WITHOUT LONG-TERM CURRENT USE OF INSULIN: Primary | ICD-10-CM

## 2020-10-16 DIAGNOSIS — E11.69 HYPERLIPIDEMIA ASSOCIATED WITH TYPE 2 DIABETES MELLITUS: ICD-10-CM

## 2020-10-16 DIAGNOSIS — E78.5 HYPERLIPIDEMIA ASSOCIATED WITH TYPE 2 DIABETES MELLITUS: ICD-10-CM

## 2020-10-16 DIAGNOSIS — N18.31 TYPE 2 DIABETES MELLITUS WITH STAGE 3A CHRONIC KIDNEY DISEASE, WITHOUT LONG-TERM CURRENT USE OF INSULIN: Primary | ICD-10-CM

## 2020-10-16 NOTE — TELEPHONE ENCOUNTER
Patient rebooked from Dr Crespo 12-2-20 to Ms Burroughs.   Patient would like to do labs prior if needed.  Need orders.   Can be booked anytime. Patient will see on my [facility].

## 2020-10-22 ENCOUNTER — PES CALL (OUTPATIENT)
Dept: ADMINISTRATIVE | Facility: CLINIC | Age: 80
End: 2020-10-22

## 2020-10-31 ENCOUNTER — EXTERNAL CHRONIC CARE MANAGEMENT (OUTPATIENT)
Dept: PRIMARY CARE CLINIC | Facility: CLINIC | Age: 80
End: 2020-10-31
Payer: MEDICARE

## 2020-10-31 PROCEDURE — 99490 PR CHRONIC CARE MGMT, 1ST 20 MIN: ICD-10-PCS | Mod: S$PBB,,, | Performed by: FAMILY MEDICINE

## 2020-10-31 PROCEDURE — 99490 CHRNC CARE MGMT STAFF 1ST 20: CPT | Mod: S$PBB,,, | Performed by: FAMILY MEDICINE

## 2020-10-31 PROCEDURE — 99490 CHRNC CARE MGMT STAFF 1ST 20: CPT | Mod: PBBFAC,PO | Performed by: FAMILY MEDICINE

## 2020-11-11 ENCOUNTER — PES CALL (OUTPATIENT)
Dept: ADMINISTRATIVE | Facility: CLINIC | Age: 80
End: 2020-11-11

## 2020-11-16 DIAGNOSIS — I48.20 CHRONIC ATRIAL FIBRILLATION: ICD-10-CM

## 2020-11-16 RX ORDER — APIXABAN 5 MG/1
TABLET, FILM COATED ORAL
Qty: 180 TABLET | Refills: 3 | Status: SHIPPED | OUTPATIENT
Start: 2020-11-16 | End: 2021-08-13 | Stop reason: SDUPTHER

## 2020-11-20 ENCOUNTER — PES CALL (OUTPATIENT)
Dept: ADMINISTRATIVE | Facility: CLINIC | Age: 80
End: 2020-11-20

## 2020-11-27 ENCOUNTER — PES CALL (OUTPATIENT)
Dept: ADMINISTRATIVE | Facility: CLINIC | Age: 80
End: 2020-11-27

## 2020-11-30 ENCOUNTER — LAB VISIT (OUTPATIENT)
Dept: LAB | Facility: HOSPITAL | Age: 80
End: 2020-11-30
Attending: PHYSICIAN ASSISTANT
Payer: MEDICARE

## 2020-11-30 ENCOUNTER — EXTERNAL CHRONIC CARE MANAGEMENT (OUTPATIENT)
Dept: PRIMARY CARE CLINIC | Facility: CLINIC | Age: 80
End: 2020-11-30
Payer: MEDICARE

## 2020-11-30 DIAGNOSIS — E11.69 HYPERLIPIDEMIA ASSOCIATED WITH TYPE 2 DIABETES MELLITUS: ICD-10-CM

## 2020-11-30 DIAGNOSIS — N18.31 TYPE 2 DIABETES MELLITUS WITH STAGE 3A CHRONIC KIDNEY DISEASE, WITHOUT LONG-TERM CURRENT USE OF INSULIN: ICD-10-CM

## 2020-11-30 DIAGNOSIS — E11.22 TYPE 2 DIABETES MELLITUS WITH STAGE 3A CHRONIC KIDNEY DISEASE, WITHOUT LONG-TERM CURRENT USE OF INSULIN: ICD-10-CM

## 2020-11-30 DIAGNOSIS — E78.5 HYPERLIPIDEMIA ASSOCIATED WITH TYPE 2 DIABETES MELLITUS: ICD-10-CM

## 2020-11-30 LAB
ALBUMIN SERPL BCP-MCNC: 3.7 G/DL (ref 3.5–5.2)
ALP SERPL-CCNC: 63 U/L (ref 55–135)
ALT SERPL W/O P-5'-P-CCNC: 22 U/L (ref 10–44)
ANION GAP SERPL CALC-SCNC: 9 MMOL/L (ref 8–16)
AST SERPL-CCNC: 18 U/L (ref 10–40)
BILIRUB SERPL-MCNC: 0.6 MG/DL (ref 0.1–1)
BUN SERPL-MCNC: 17 MG/DL (ref 8–23)
CALCIUM SERPL-MCNC: 8.8 MG/DL (ref 8.7–10.5)
CHLORIDE SERPL-SCNC: 105 MMOL/L (ref 95–110)
CO2 SERPL-SCNC: 27 MMOL/L (ref 23–29)
CREAT SERPL-MCNC: 1.1 MG/DL (ref 0.5–1.4)
EST. GFR  (AFRICAN AMERICAN): >60 ML/MIN/1.73 M^2
EST. GFR  (NON AFRICAN AMERICAN): >60 ML/MIN/1.73 M^2
ESTIMATED AVG GLUCOSE: 117 MG/DL (ref 68–131)
GLUCOSE SERPL-MCNC: 133 MG/DL (ref 70–110)
HBA1C MFR BLD HPLC: 5.7 % (ref 4–5.6)
POTASSIUM SERPL-SCNC: 4.4 MMOL/L (ref 3.5–5.1)
PROT SERPL-MCNC: 7 G/DL (ref 6–8.4)
SODIUM SERPL-SCNC: 141 MMOL/L (ref 136–145)

## 2020-11-30 PROCEDURE — 83036 HEMOGLOBIN GLYCOSYLATED A1C: CPT

## 2020-11-30 PROCEDURE — 36415 COLL VENOUS BLD VENIPUNCTURE: CPT | Mod: PO

## 2020-11-30 PROCEDURE — 99490 PR CHRONIC CARE MGMT, 1ST 20 MIN: ICD-10-PCS | Mod: S$PBB,,, | Performed by: FAMILY MEDICINE

## 2020-11-30 PROCEDURE — 99490 CHRNC CARE MGMT STAFF 1ST 20: CPT | Mod: S$PBB,,, | Performed by: FAMILY MEDICINE

## 2020-11-30 PROCEDURE — 99490 CHRNC CARE MGMT STAFF 1ST 20: CPT | Mod: PBBFAC,PO | Performed by: FAMILY MEDICINE

## 2020-11-30 PROCEDURE — 80053 COMPREHEN METABOLIC PANEL: CPT

## 2020-12-02 ENCOUNTER — OFFICE VISIT (OUTPATIENT)
Dept: FAMILY MEDICINE | Facility: CLINIC | Age: 80
End: 2020-12-02
Payer: MEDICARE

## 2020-12-02 VITALS
TEMPERATURE: 97 F | HEIGHT: 73 IN | RESPIRATION RATE: 18 BRPM | HEART RATE: 67 BPM | SYSTOLIC BLOOD PRESSURE: 116 MMHG | BODY MASS INDEX: 27.82 KG/M2 | WEIGHT: 209.88 LBS | OXYGEN SATURATION: 95 % | DIASTOLIC BLOOD PRESSURE: 64 MMHG

## 2020-12-02 DIAGNOSIS — I48.20 CHRONIC ATRIAL FIBRILLATION: ICD-10-CM

## 2020-12-02 DIAGNOSIS — E11.69 HYPERLIPIDEMIA ASSOCIATED WITH TYPE 2 DIABETES MELLITUS: Primary | ICD-10-CM

## 2020-12-02 DIAGNOSIS — E78.5 HYPERLIPIDEMIA ASSOCIATED WITH TYPE 2 DIABETES MELLITUS: Primary | ICD-10-CM

## 2020-12-02 DIAGNOSIS — Z28.39 IMMUNIZATION DEFICIENCY: ICD-10-CM

## 2020-12-02 DIAGNOSIS — K21.9 GASTROESOPHAGEAL REFLUX DISEASE WITHOUT ESOPHAGITIS: ICD-10-CM

## 2020-12-02 DIAGNOSIS — E11.42 DIABETIC POLYNEUROPATHY ASSOCIATED WITH TYPE 2 DIABETES MELLITUS: ICD-10-CM

## 2020-12-02 DIAGNOSIS — I34.0 NON-RHEUMATIC MITRAL REGURGITATION: ICD-10-CM

## 2020-12-02 PROCEDURE — G0009 ADMIN PNEUMOCOCCAL VACCINE: HCPCS | Mod: PBBFAC,PO

## 2020-12-02 PROCEDURE — G0008 ADMIN INFLUENZA VIRUS VAC: HCPCS | Mod: PBBFAC,PO

## 2020-12-02 PROCEDURE — 99214 PR OFFICE/OUTPT VISIT, EST, LEVL IV, 30-39 MIN: ICD-10-PCS | Mod: S$PBB,,, | Performed by: PHYSICIAN ASSISTANT

## 2020-12-02 PROCEDURE — 99999 PR PBB SHADOW E&M-EST. PATIENT-LVL IV: ICD-10-PCS | Mod: PBBFAC,,, | Performed by: PHYSICIAN ASSISTANT

## 2020-12-02 PROCEDURE — 99214 OFFICE O/P EST MOD 30 MIN: CPT | Mod: S$PBB,,, | Performed by: PHYSICIAN ASSISTANT

## 2020-12-02 PROCEDURE — 99999 PR PBB SHADOW E&M-EST. PATIENT-LVL IV: CPT | Mod: PBBFAC,,, | Performed by: PHYSICIAN ASSISTANT

## 2020-12-02 PROCEDURE — 90694 VACC AIIV4 NO PRSRV 0.5ML IM: CPT | Mod: PBBFAC,PO

## 2020-12-02 PROCEDURE — 99214 OFFICE O/P EST MOD 30 MIN: CPT | Mod: PBBFAC,PO | Performed by: PHYSICIAN ASSISTANT

## 2020-12-02 NOTE — PROGRESS NOTES
Subjective:       Patient ID: Domitila Walters is a 80 y.o. male.    Chief Complaint: Follow-up (6 month follow up )    Mr. Walters is a 80 year old male with hyperlipidemia, diabetes, and atrial fibrillation. The patient recently had labs that revealed a slight increase in blood sugar levels. The patient reports overall he is feeling well. He is due to update his foot exam. He is also due to update his flu and pnuemonia shots.     Review of patient's allergies indicates:  No Known Allergies      Current Outpatient Medications:     diltiaZEM (CARDIZEM CD) 300 MG 24 hr capsule, TAKE ONE CAPSULE BY MOUTH EVERY DAY, Disp: 90 capsule, Rfl: 11    ELIQUIS 5 mg Tab, TAKE ONE TABLET BY MOUTH TWICE DAILY, Disp: 180 tablet, Rfl: 3    FOLBIC 2.5-25-2 mg Tab, TAKE ONE TABLET BY MOUTH DAILY, Disp: 90 tablet, Rfl: 4    omeprazole (PRILOSEC) 20 MG capsule, TAKE ONE CAPSULE BY MOUTH ONCE DAILY, Disp: 90 capsule, Rfl: 3    pravastatin (PRAVACHOL) 40 MG tablet, TAKE ONE TABLET BY MOUTH ONCE DAILY, Disp: 90 tablet, Rfl: 3    repaglinide (PRANDIN) 1 MG tablet, TAKE ONE TABLET BY MOUTH TWICE DAILY BEFORE MEALS, Disp: 180 tablet, Rfl: 3    cholecalciferol, vitamin D3, 1,000 unit capsule, Take 1 capsule (1,000 Units total) by mouth once daily. (Patient not taking: Reported on 12/2/2020), Disp: 90 capsule, Rfl: 11    Lab Results   Component Value Date    WBC 7.41 10/03/2019    HGB 15.1 10/03/2019    HCT 47.6 10/03/2019     10/03/2019    CHOL 172 06/02/2020    TRIG 116 06/02/2020    HDL 50 06/02/2020    ALT 22 11/30/2020    AST 18 11/30/2020     11/30/2020    K 4.4 11/30/2020     11/30/2020    CREATININE 1.1 11/30/2020    BUN 17 11/30/2020    CO2 27 11/30/2020    TSH 1.462 05/31/2018    PSA 2.2 01/29/2016    INR 1.0 09/28/2017    HGBA1C 5.7 (H) 11/30/2020       Review of Systems   Constitutional: Negative for activity change, appetite change and fever.   HENT: Negative for postnasal drip, rhinorrhea and sinus pressure.     Eyes: Negative for visual disturbance.   Respiratory: Negative for cough and shortness of breath.    Cardiovascular: Negative for chest pain.   Gastrointestinal: Negative for abdominal distention and abdominal pain.   Genitourinary: Negative for difficulty urinating and dysuria.   Musculoskeletal: Negative for arthralgias and myalgias.   Neurological: Negative for headaches.   Hematological: Negative for adenopathy.   Psychiatric/Behavioral: The patient is not nervous/anxious.        Objective:      Physical Exam  Vitals signs reviewed.   Constitutional:       General: He is not in acute distress.     Appearance: Normal appearance. He is well-developed. He is not diaphoretic.   HENT:      Head: Normocephalic and atraumatic.      Nose: Nose normal.   Neck:      Musculoskeletal: Normal range of motion and neck supple.      Thyroid: No thyromegaly.      Vascular: No JVD.      Trachea: No tracheal deviation.   Cardiovascular:      Rate and Rhythm: Normal rate. Rhythm irregular.      Pulses:           Dorsalis pedis pulses are 2+ on the right side and 2+ on the left side.        Posterior tibial pulses are 2+ on the right side and 2+ on the left side.      Heart sounds: Normal heart sounds. No murmur.   Pulmonary:      Effort: Pulmonary effort is normal. No respiratory distress.      Breath sounds: Normal breath sounds. No wheezing or rales.   Abdominal:      General: Bowel sounds are normal. There is no distension.      Palpations: Abdomen is soft. There is no mass.      Tenderness: There is no abdominal tenderness. There is no guarding or rebound.   Musculoskeletal:         General: No tenderness.      Comments:        Feet:      Right foot:      Protective Sensation: 10 sites tested. 10 sites sensed.      Skin integrity: Dry skin present. No ulcer or blister.      Toenail Condition: Right toenails are abnormally thick.      Left foot:      Protective Sensation: 10 sites tested. 10 sites sensed.      Skin integrity:  Dry skin present. No ulcer or blister.      Toenail Condition: Left toenails are abnormally thick.   Lymphadenopathy:      Cervical: No cervical adenopathy.   Skin:     General: Skin is warm and dry.      Coloration: Skin is not pale.      Findings: No erythema or rash.   Neurological:      Mental Status: He is alert and oriented to person, place, and time.      Cranial Nerves: No cranial nerve deficit.      Coordination: Coordination normal.   Psychiatric:         Behavior: Behavior normal.         Thought Content: Thought content normal.         Judgment: Judgment normal.         Assessment:       1. Hyperlipidemia associated with type 2 diabetes mellitus    2. Non-rheumatic mitral regurgitation    3. Diabetic polyneuropathy associated with type 2 diabetes mellitus    4. Gastroesophageal reflux disease without esophagitis    5. Immunization deficiency    6. Chronic atrial fibrillation, onset in his 30s, CHADS-VAS score 3        Plan:       Domitila was seen today for follow-up.    Diagnoses and all orders for this visit:    Hyperlipidemia associated with type 2 diabetes mellitus  -     Hemoglobin A1C; Future  -     Lipid Panel; Future  -     Comprehensive Metabolic Panel; Future  -     CBC Auto Differential; Future  High fiber diet  Continue current medication  Non-rheumatic mitral regurgitation  Follow up with cardiology  Diabetic polyneuropathy associated with type 2 diabetes mellitus  -     Hemoglobin A1C; Future  -     Lipid Panel; Future  -     Comprehensive Metabolic Panel; Future    Gastroesophageal reflux disease without esophagitis  -     Comprehensive Metabolic Panel; Future  -     CBC Auto Differential; Future  Stable on current medication  Immunization deficiency  -     (In Office Administered) Pneumococcal Conjugate Vaccine (13 Valent) (IM)    Chronic atrial fibrillation, onset in his 30s, CHADS-VAS score 3  Stable on current medications  Follow up with cardiology  Other orders  -     Influenza -  Quadrivalent (Adjuvanted)    Fasting labs in 6 months  Follow up after labs.

## 2020-12-08 ENCOUNTER — PES CALL (OUTPATIENT)
Dept: ADMINISTRATIVE | Facility: CLINIC | Age: 80
End: 2020-12-08

## 2020-12-11 ENCOUNTER — PATIENT MESSAGE (OUTPATIENT)
Dept: OTHER | Facility: OTHER | Age: 80
End: 2020-12-11

## 2020-12-31 ENCOUNTER — EXTERNAL CHRONIC CARE MANAGEMENT (OUTPATIENT)
Dept: PRIMARY CARE CLINIC | Facility: CLINIC | Age: 80
End: 2020-12-31
Payer: MEDICARE

## 2020-12-31 PROCEDURE — 99490 CHRNC CARE MGMT STAFF 1ST 20: CPT | Mod: PBBFAC,PO | Performed by: FAMILY MEDICINE

## 2020-12-31 PROCEDURE — 99490 PR CHRONIC CARE MGMT, 1ST 20 MIN: ICD-10-PCS | Mod: S$PBB,,, | Performed by: FAMILY MEDICINE

## 2020-12-31 PROCEDURE — 99490 CHRNC CARE MGMT STAFF 1ST 20: CPT | Mod: S$PBB,,, | Performed by: FAMILY MEDICINE

## 2021-01-31 ENCOUNTER — EXTERNAL CHRONIC CARE MANAGEMENT (OUTPATIENT)
Dept: PRIMARY CARE CLINIC | Facility: CLINIC | Age: 81
End: 2021-01-31
Payer: MEDICARE

## 2021-01-31 PROCEDURE — 99490 CHRNC CARE MGMT STAFF 1ST 20: CPT | Mod: PBBFAC,PO | Performed by: FAMILY MEDICINE

## 2021-01-31 PROCEDURE — 99490 CHRNC CARE MGMT STAFF 1ST 20: CPT | Mod: S$PBB,,, | Performed by: FAMILY MEDICINE

## 2021-01-31 PROCEDURE — 99490 PR CHRONIC CARE MGMT, 1ST 20 MIN: ICD-10-PCS | Mod: S$PBB,,, | Performed by: FAMILY MEDICINE

## 2021-02-04 ENCOUNTER — PES CALL (OUTPATIENT)
Dept: ADMINISTRATIVE | Facility: CLINIC | Age: 81
End: 2021-02-04

## 2021-02-11 ENCOUNTER — IMMUNIZATION (OUTPATIENT)
Dept: PRIMARY CARE CLINIC | Facility: CLINIC | Age: 81
End: 2021-02-11
Payer: MEDICARE

## 2021-02-11 DIAGNOSIS — Z23 NEED FOR VACCINATION: Primary | ICD-10-CM

## 2021-02-11 PROCEDURE — 91300 COVID-19, MRNA, LNP-S, PF, 30 MCG/0.3 ML DOSE VACCINE: ICD-10-PCS | Mod: S$GLB,,, | Performed by: FAMILY MEDICINE

## 2021-02-11 PROCEDURE — 0001A COVID-19, MRNA, LNP-S, PF, 30 MCG/0.3 ML DOSE VACCINE: ICD-10-PCS | Mod: S$GLB,,, | Performed by: FAMILY MEDICINE

## 2021-02-11 PROCEDURE — 91300 COVID-19, MRNA, LNP-S, PF, 30 MCG/0.3 ML DOSE VACCINE: CPT | Mod: S$GLB,,, | Performed by: FAMILY MEDICINE

## 2021-02-11 PROCEDURE — 0001A COVID-19, MRNA, LNP-S, PF, 30 MCG/0.3 ML DOSE VACCINE: CPT | Mod: S$GLB,,, | Performed by: FAMILY MEDICINE

## 2021-02-28 ENCOUNTER — EXTERNAL CHRONIC CARE MANAGEMENT (OUTPATIENT)
Dept: PRIMARY CARE CLINIC | Facility: CLINIC | Age: 81
End: 2021-02-28
Payer: MEDICARE

## 2021-02-28 PROCEDURE — 99490 CHRNC CARE MGMT STAFF 1ST 20: CPT | Mod: PBBFAC,PO | Performed by: FAMILY MEDICINE

## 2021-02-28 PROCEDURE — 99490 PR CHRONIC CARE MGMT, 1ST 20 MIN: ICD-10-PCS | Mod: S$PBB,,, | Performed by: FAMILY MEDICINE

## 2021-02-28 PROCEDURE — 99490 CHRNC CARE MGMT STAFF 1ST 20: CPT | Mod: S$PBB,,, | Performed by: FAMILY MEDICINE

## 2021-03-04 ENCOUNTER — IMMUNIZATION (OUTPATIENT)
Dept: PRIMARY CARE CLINIC | Facility: CLINIC | Age: 81
End: 2021-03-04
Payer: MEDICARE

## 2021-03-04 DIAGNOSIS — Z23 NEED FOR VACCINATION: Primary | ICD-10-CM

## 2021-03-04 PROCEDURE — 91300 COVID-19, MRNA, LNP-S, PF, 30 MCG/0.3 ML DOSE VACCINE: ICD-10-PCS | Mod: S$GLB,,, | Performed by: FAMILY MEDICINE

## 2021-03-04 PROCEDURE — 91300 COVID-19, MRNA, LNP-S, PF, 30 MCG/0.3 ML DOSE VACCINE: CPT | Mod: S$GLB,,, | Performed by: FAMILY MEDICINE

## 2021-03-04 PROCEDURE — 0002A COVID-19, MRNA, LNP-S, PF, 30 MCG/0.3 ML DOSE VACCINE: CPT | Mod: CV19,S$GLB,, | Performed by: FAMILY MEDICINE

## 2021-03-04 PROCEDURE — 0002A COVID-19, MRNA, LNP-S, PF, 30 MCG/0.3 ML DOSE VACCINE: ICD-10-PCS | Mod: CV19,S$GLB,, | Performed by: FAMILY MEDICINE

## 2021-03-10 ENCOUNTER — OFFICE VISIT (OUTPATIENT)
Dept: HOME HEALTH SERVICES | Facility: CLINIC | Age: 81
End: 2021-03-10
Payer: MEDICARE

## 2021-03-10 VITALS
BODY MASS INDEX: 27.83 KG/M2 | TEMPERATURE: 98 F | HEART RATE: 81 BPM | DIASTOLIC BLOOD PRESSURE: 102 MMHG | SYSTOLIC BLOOD PRESSURE: 153 MMHG | HEIGHT: 73 IN | WEIGHT: 210 LBS

## 2021-03-10 DIAGNOSIS — E11.42 DIABETIC POLYNEUROPATHY ASSOCIATED WITH TYPE 2 DIABETES MELLITUS: ICD-10-CM

## 2021-03-10 DIAGNOSIS — Z00.00 ENCOUNTER FOR PREVENTIVE HEALTH EXAMINATION: Primary | ICD-10-CM

## 2021-03-10 DIAGNOSIS — K21.9 GASTROESOPHAGEAL REFLUX DISEASE WITHOUT ESOPHAGITIS: ICD-10-CM

## 2021-03-10 DIAGNOSIS — E11.22 TYPE 2 DIABETES MELLITUS WITH STAGE 3A CHRONIC KIDNEY DISEASE, WITHOUT LONG-TERM CURRENT USE OF INSULIN: ICD-10-CM

## 2021-03-10 DIAGNOSIS — E11.69 HYPERLIPIDEMIA ASSOCIATED WITH TYPE 2 DIABETES MELLITUS: ICD-10-CM

## 2021-03-10 DIAGNOSIS — N18.31 TYPE 2 DIABETES MELLITUS WITH STAGE 3A CHRONIC KIDNEY DISEASE, WITHOUT LONG-TERM CURRENT USE OF INSULIN: ICD-10-CM

## 2021-03-10 DIAGNOSIS — E78.5 HYPERLIPIDEMIA ASSOCIATED WITH TYPE 2 DIABETES MELLITUS: ICD-10-CM

## 2021-03-10 DIAGNOSIS — I15.2 HYPERTENSION ASSOCIATED WITH DIABETES: ICD-10-CM

## 2021-03-10 DIAGNOSIS — N18.30 STAGE 3 CHRONIC KIDNEY DISEASE, UNSPECIFIED WHETHER STAGE 3A OR 3B CKD: ICD-10-CM

## 2021-03-10 DIAGNOSIS — I48.20 CHRONIC ATRIAL FIBRILLATION: ICD-10-CM

## 2021-03-10 DIAGNOSIS — E11.59 HYPERTENSION ASSOCIATED WITH DIABETES: ICD-10-CM

## 2021-03-10 PROCEDURE — G0439 PR MEDICARE ANNUAL WELLNESS SUBSEQUENT VISIT: ICD-10-PCS | Mod: S$GLB,,, | Performed by: NURSE PRACTITIONER

## 2021-03-10 PROCEDURE — G0439 PPPS, SUBSEQ VISIT: HCPCS | Mod: S$GLB,,, | Performed by: NURSE PRACTITIONER

## 2021-03-10 RX ORDER — FERROUS SULFATE, DRIED 160(50) MG
1 TABLET, EXTENDED RELEASE ORAL 2 TIMES DAILY WITH MEALS
COMMUNITY

## 2021-03-31 ENCOUNTER — EXTERNAL CHRONIC CARE MANAGEMENT (OUTPATIENT)
Dept: PRIMARY CARE CLINIC | Facility: CLINIC | Age: 81
End: 2021-03-31
Payer: MEDICARE

## 2021-03-31 PROCEDURE — 99490 CHRNC CARE MGMT STAFF 1ST 20: CPT | Mod: PBBFAC,PO | Performed by: FAMILY MEDICINE

## 2021-03-31 PROCEDURE — 99490 PR CHRONIC CARE MGMT, 1ST 20 MIN: ICD-10-PCS | Mod: S$PBB,,, | Performed by: FAMILY MEDICINE

## 2021-03-31 PROCEDURE — 99490 CHRNC CARE MGMT STAFF 1ST 20: CPT | Mod: S$PBB,,, | Performed by: FAMILY MEDICINE

## 2021-04-30 ENCOUNTER — EXTERNAL CHRONIC CARE MANAGEMENT (OUTPATIENT)
Dept: PRIMARY CARE CLINIC | Facility: CLINIC | Age: 81
End: 2021-04-30
Payer: MEDICARE

## 2021-04-30 PROCEDURE — 99490 CHRNC CARE MGMT STAFF 1ST 20: CPT | Mod: PBBFAC,PO | Performed by: FAMILY MEDICINE

## 2021-04-30 PROCEDURE — 99490 PR CHRONIC CARE MGMT, 1ST 20 MIN: ICD-10-PCS | Mod: S$PBB,,, | Performed by: FAMILY MEDICINE

## 2021-04-30 PROCEDURE — 99490 CHRNC CARE MGMT STAFF 1ST 20: CPT | Mod: S$PBB,,, | Performed by: FAMILY MEDICINE

## 2021-05-17 DIAGNOSIS — E11.59 CONTROLLED TYPE 2 DIABETES MELLITUS WITH OTHER CIRCULATORY COMPLICATION, WITHOUT LONG-TERM CURRENT USE OF INSULIN: ICD-10-CM

## 2021-05-17 DIAGNOSIS — E11.8 TYPE 2 DIABETES MELLITUS WITH COMPLICATION, WITHOUT LONG-TERM CURRENT USE OF INSULIN: ICD-10-CM

## 2021-05-18 RX ORDER — REPAGLINIDE 1 MG/1
TABLET ORAL
Qty: 180 TABLET | Refills: 3 | Status: SHIPPED | OUTPATIENT
Start: 2021-05-18 | End: 2022-05-06

## 2021-05-18 RX ORDER — OMEPRAZOLE 20 MG/1
CAPSULE, DELAYED RELEASE ORAL
Qty: 90 CAPSULE | Refills: 3 | Status: SHIPPED | OUTPATIENT
Start: 2021-05-18 | End: 2022-05-06

## 2021-05-31 ENCOUNTER — EXTERNAL CHRONIC CARE MANAGEMENT (OUTPATIENT)
Dept: PRIMARY CARE CLINIC | Facility: CLINIC | Age: 81
End: 2021-05-31
Payer: MEDICARE

## 2021-05-31 PROCEDURE — 99490 CHRNC CARE MGMT STAFF 1ST 20: CPT | Mod: S$PBB,,, | Performed by: FAMILY MEDICINE

## 2021-05-31 PROCEDURE — 99490 CHRNC CARE MGMT STAFF 1ST 20: CPT | Mod: PBBFAC,PO | Performed by: FAMILY MEDICINE

## 2021-05-31 PROCEDURE — 99490 PR CHRONIC CARE MGMT, 1ST 20 MIN: ICD-10-PCS | Mod: S$PBB,,, | Performed by: FAMILY MEDICINE

## 2021-06-30 ENCOUNTER — EXTERNAL CHRONIC CARE MANAGEMENT (OUTPATIENT)
Dept: PRIMARY CARE CLINIC | Facility: CLINIC | Age: 81
End: 2021-06-30
Payer: MEDICARE

## 2021-06-30 PROCEDURE — 99490 CHRNC CARE MGMT STAFF 1ST 20: CPT | Mod: S$PBB,,, | Performed by: FAMILY MEDICINE

## 2021-06-30 PROCEDURE — 99490 CHRNC CARE MGMT STAFF 1ST 20: CPT | Mod: PBBFAC,PO | Performed by: FAMILY MEDICINE

## 2021-06-30 PROCEDURE — 99490 PR CHRONIC CARE MGMT, 1ST 20 MIN: ICD-10-PCS | Mod: S$PBB,,, | Performed by: FAMILY MEDICINE

## 2021-07-31 ENCOUNTER — EXTERNAL CHRONIC CARE MANAGEMENT (OUTPATIENT)
Dept: PRIMARY CARE CLINIC | Facility: CLINIC | Age: 81
End: 2021-07-31
Payer: MEDICARE

## 2021-07-31 PROCEDURE — 99490 PR CHRONIC CARE MGMT, 1ST 20 MIN: ICD-10-PCS | Mod: S$PBB,,, | Performed by: FAMILY MEDICINE

## 2021-07-31 PROCEDURE — 99490 CHRNC CARE MGMT STAFF 1ST 20: CPT | Mod: S$PBB,,, | Performed by: FAMILY MEDICINE

## 2021-07-31 PROCEDURE — 99490 CHRNC CARE MGMT STAFF 1ST 20: CPT | Mod: PBBFAC,PO | Performed by: FAMILY MEDICINE

## 2021-08-04 ENCOUNTER — PATIENT MESSAGE (OUTPATIENT)
Dept: ADMINISTRATIVE | Facility: HOSPITAL | Age: 81
End: 2021-08-04

## 2021-08-31 ENCOUNTER — EXTERNAL CHRONIC CARE MANAGEMENT (OUTPATIENT)
Dept: PRIMARY CARE CLINIC | Facility: CLINIC | Age: 81
End: 2021-08-31
Payer: MEDICARE

## 2021-08-31 PROCEDURE — 99490 PR CHRONIC CARE MGMT, 1ST 20 MIN: ICD-10-PCS | Mod: S$PBB,,, | Performed by: FAMILY MEDICINE

## 2021-08-31 PROCEDURE — 99490 CHRNC CARE MGMT STAFF 1ST 20: CPT | Mod: S$PBB,,, | Performed by: FAMILY MEDICINE

## 2021-08-31 PROCEDURE — 99490 CHRNC CARE MGMT STAFF 1ST 20: CPT | Mod: PBBFAC,PO | Performed by: FAMILY MEDICINE

## 2021-09-30 ENCOUNTER — EXTERNAL CHRONIC CARE MANAGEMENT (OUTPATIENT)
Dept: PRIMARY CARE CLINIC | Facility: CLINIC | Age: 81
End: 2021-09-30
Payer: MEDICARE

## 2021-09-30 PROCEDURE — 99490 CHRNC CARE MGMT STAFF 1ST 20: CPT | Mod: S$PBB,,, | Performed by: FAMILY MEDICINE

## 2021-09-30 PROCEDURE — 99490 PR CHRONIC CARE MGMT, 1ST 20 MIN: ICD-10-PCS | Mod: S$PBB,,, | Performed by: FAMILY MEDICINE

## 2021-09-30 PROCEDURE — 99490 CHRNC CARE MGMT STAFF 1ST 20: CPT | Mod: PBBFAC,PO | Performed by: FAMILY MEDICINE

## 2021-10-31 ENCOUNTER — EXTERNAL CHRONIC CARE MANAGEMENT (OUTPATIENT)
Dept: PRIMARY CARE CLINIC | Facility: CLINIC | Age: 81
End: 2021-10-31
Payer: MEDICARE

## 2021-10-31 PROCEDURE — 99490 CHRNC CARE MGMT STAFF 1ST 20: CPT | Mod: PBBFAC,PO | Performed by: FAMILY MEDICINE

## 2021-10-31 PROCEDURE — 99490 PR CHRONIC CARE MGMT, 1ST 20 MIN: ICD-10-PCS | Mod: S$PBB,,, | Performed by: FAMILY MEDICINE

## 2021-10-31 PROCEDURE — 99490 CHRNC CARE MGMT STAFF 1ST 20: CPT | Mod: S$PBB,,, | Performed by: FAMILY MEDICINE

## 2021-11-30 ENCOUNTER — EXTERNAL CHRONIC CARE MANAGEMENT (OUTPATIENT)
Dept: PRIMARY CARE CLINIC | Facility: CLINIC | Age: 81
End: 2021-11-30
Payer: MEDICARE

## 2021-11-30 PROCEDURE — 99490 PR CHRONIC CARE MGMT, 1ST 20 MIN: ICD-10-PCS | Mod: S$PBB,,, | Performed by: FAMILY MEDICINE

## 2021-11-30 PROCEDURE — 99490 CHRNC CARE MGMT STAFF 1ST 20: CPT | Mod: PBBFAC,PO | Performed by: FAMILY MEDICINE

## 2021-11-30 PROCEDURE — 99490 CHRNC CARE MGMT STAFF 1ST 20: CPT | Mod: S$PBB,,, | Performed by: FAMILY MEDICINE

## 2022-02-14 ENCOUNTER — PES CALL (OUTPATIENT)
Dept: ADMINISTRATIVE | Facility: CLINIC | Age: 82
End: 2022-02-14
Payer: MEDICARE

## 2022-03-22 ENCOUNTER — OFFICE VISIT (OUTPATIENT)
Dept: FAMILY MEDICINE | Facility: CLINIC | Age: 82
End: 2022-03-22
Payer: MEDICARE

## 2022-03-22 VITALS
HEART RATE: 62 BPM | OXYGEN SATURATION: 95 % | RESPIRATION RATE: 16 BRPM | TEMPERATURE: 98 F | HEIGHT: 73 IN | DIASTOLIC BLOOD PRESSURE: 78 MMHG | SYSTOLIC BLOOD PRESSURE: 136 MMHG | WEIGHT: 209.88 LBS | BODY MASS INDEX: 27.82 KG/M2

## 2022-03-22 DIAGNOSIS — K21.9 GASTROESOPHAGEAL REFLUX DISEASE WITHOUT ESOPHAGITIS: ICD-10-CM

## 2022-03-22 DIAGNOSIS — I48.20 CHRONIC ATRIAL FIBRILLATION: ICD-10-CM

## 2022-03-22 DIAGNOSIS — B35.1 ONYCHOMYCOSIS: ICD-10-CM

## 2022-03-22 DIAGNOSIS — I15.2 HYPERTENSION ASSOCIATED WITH DIABETES: ICD-10-CM

## 2022-03-22 DIAGNOSIS — H35.3211 EXUDATIVE AGE-RELATED MACULAR DEGENERATION, RIGHT EYE, WITH ACTIVE CHOROIDAL NEOVASCULARIZATION: ICD-10-CM

## 2022-03-22 DIAGNOSIS — E11.69 HYPERLIPIDEMIA ASSOCIATED WITH TYPE 2 DIABETES MELLITUS: ICD-10-CM

## 2022-03-22 DIAGNOSIS — E11.59 HYPERTENSION ASSOCIATED WITH DIABETES: ICD-10-CM

## 2022-03-22 DIAGNOSIS — E78.5 HYPERLIPIDEMIA ASSOCIATED WITH TYPE 2 DIABETES MELLITUS: ICD-10-CM

## 2022-03-22 DIAGNOSIS — R82.90 BAD ODOR OF URINE: ICD-10-CM

## 2022-03-22 DIAGNOSIS — E11.42 DIABETIC POLYNEUROPATHY ASSOCIATED WITH TYPE 2 DIABETES MELLITUS: Primary | ICD-10-CM

## 2022-03-22 PROCEDURE — 99999 PR PBB SHADOW E&M-EST. PATIENT-LVL V: CPT | Mod: PBBFAC,,, | Performed by: PHYSICIAN ASSISTANT

## 2022-03-22 PROCEDURE — 99214 PR OFFICE/OUTPT VISIT, EST, LEVL IV, 30-39 MIN: ICD-10-PCS | Mod: S$PBB,,, | Performed by: PHYSICIAN ASSISTANT

## 2022-03-22 PROCEDURE — 99214 OFFICE O/P EST MOD 30 MIN: CPT | Mod: S$PBB,,, | Performed by: PHYSICIAN ASSISTANT

## 2022-03-22 PROCEDURE — 99999 PR PBB SHADOW E&M-EST. PATIENT-LVL V: ICD-10-PCS | Mod: PBBFAC,,, | Performed by: PHYSICIAN ASSISTANT

## 2022-03-22 PROCEDURE — G0008 ADMIN INFLUENZA VIRUS VAC: HCPCS | Mod: PBBFAC,PO

## 2022-03-22 PROCEDURE — 99215 OFFICE O/P EST HI 40 MIN: CPT | Mod: PBBFAC,PO,25 | Performed by: PHYSICIAN ASSISTANT

## 2022-03-22 NOTE — PROGRESS NOTES
Subjective:       Patient ID: Domitila Walters is a 82 y.o. male.    Chief Complaint: Annual Exam    Mr. Walters is a 82 year old patient with DM, HTN, and chronic afib. The patient is here today for routine follow up; he has not had labs in over a year. The patient is due for a flu shot and agrees to receive this. The patient does complain of strong odor of urine that resolves when he drinks a lot of water. The patient is due for foot exam today. The patient is followed closely by eye doctor, Dr. Caruso.    Review of patient's allergies indicates:  No Known Allergies      Current Outpatient Medications:     beta-carotene,A,-vits C,E/mins (OCUVITE ORAL), Take by mouth., Disp: , Rfl:     calcium-vitamin D3 (OS-BROOKLYNN 500 + D3) 500 mg-5 mcg (200 unit) per tablet, Take 1 tablet by mouth 2 (two) times daily with meals., Disp: , Rfl:     cholecalciferol, vitamin D3, 1,000 unit capsule, Take 1 capsule (1,000 Units total) by mouth once daily., Disp: 90 capsule, Rfl: 11    diltiaZEM (CARDIZEM CD) 300 MG 24 hr capsule, TAKE ONE CAPSULE BY MOUTH EVERY DAY, Disp: 90 capsule, Rfl: 11    ELIQUIS 5 mg Tab, TAKE ONE TABLET BY MOUTH TWICE DAILY, Disp: 180 tablet, Rfl: 3    FOLBIC 2.5-25-2 mg Tab, TAKE ONE TABLET BY MOUTH DAILY, Disp: 90 tablet, Rfl: 4    omeprazole (PRILOSEC) 20 MG capsule, TAKE ONE CAPSULE BY MOUTH ONCE DAILY, Disp: 90 capsule, Rfl: 3    pravastatin (PRAVACHOL) 40 MG tablet, TAKE ONE TABLET BY MOUTH ONCE DAILY, Disp: 90 tablet, Rfl: 3    repaglinide (PRANDIN) 1 MG tablet, TAKE ONE TABLET BY MOUTH TWICE DAILY BEFORE MEALS, Disp: 180 tablet, Rfl: 3    Lab Results   Component Value Date    WBC 7.41 10/03/2019    HGB 15.1 10/03/2019    HCT 47.6 10/03/2019     10/03/2019    CHOL 172 06/02/2020    TRIG 116 06/02/2020    HDL 50 06/02/2020    ALT 22 11/30/2020    AST 18 11/30/2020     11/30/2020    K 4.4 11/30/2020     11/30/2020    CREATININE 1.1 11/30/2020    BUN 17 11/30/2020    CO2 27  11/30/2020    TSH 1.462 05/31/2018    PSA 2.2 01/29/2016    INR 1.0 09/28/2017    HGBA1C 5.7 (H) 11/30/2020       Review of Systems   Constitutional: Negative for activity change, appetite change and fever.   HENT: Negative for postnasal drip, rhinorrhea and sinus pressure.    Eyes: Negative for visual disturbance.   Respiratory: Negative for cough and shortness of breath.    Cardiovascular: Negative for chest pain.   Gastrointestinal: Negative for abdominal distention and abdominal pain.   Genitourinary: Negative for difficulty urinating and dysuria.        Urine odor   Musculoskeletal: Negative for arthralgias and myalgias.   Neurological: Negative for headaches.   Hematological: Negative for adenopathy.   Psychiatric/Behavioral: The patient is not nervous/anxious.        Objective:      Physical Exam  Constitutional:       Appearance: Normal appearance.   HENT:      Head: Normocephalic and atraumatic.   Eyes:      Conjunctiva/sclera: Conjunctivae normal.   Cardiovascular:      Rate and Rhythm: Normal rate. Rhythm irregular.      Pulses:           Dorsalis pedis pulses are 1+ on the right side and 1+ on the left side.        Posterior tibial pulses are 1+ on the right side and 1+ on the left side.   Pulmonary:      Effort: Pulmonary effort is normal. No respiratory distress.      Breath sounds: Normal breath sounds. No wheezing.   Abdominal:      General: There is no distension.      Palpations: There is no mass.      Tenderness: There is no abdominal tenderness.   Musculoskeletal:      Right lower leg: No edema.      Left lower leg: No edema.   Feet:      Right foot:      Protective Sensation: 8 sites tested. 8 sites sensed.      Skin integrity: Dry skin present. No ulcer or blister.      Toenail Condition: Right toenails are abnormally thick. Fungal disease present.     Left foot:      Protective Sensation: 8 sites tested. 8 sites sensed.      Skin integrity: Dry skin present. No ulcer or blister.      Toenail  Condition: Left toenails are abnormally thick. Fungal disease present.  Lymphadenopathy:      Cervical: No cervical adenopathy.   Skin:     Findings: No erythema.   Neurological:      Mental Status: He is alert and oriented to person, place, and time.   Psychiatric:         Behavior: Behavior normal.         Assessment:       1. Diabetic polyneuropathy associated with type 2 diabetes mellitus    2. Hypertension associated with diabetes    3. Chronic atrial fibrillation, onset in his 30s, CHADS-VAS score 3    4. Hyperlipidemia associated with type 2 diabetes mellitus    5. Gastroesophageal reflux disease without esophagitis    6. Exudative age-related macular degeneration, right eye, with active choroidal neovascularization    7. Bad odor of urine    8. Onychomycosis        Plan:   Domitila was seen today for annual exam.    Diagnoses and all orders for this visit:    Diabetic polyneuropathy associated with type 2 diabetes mellitus  -     Hemoglobin A1C; Future  -     Microalbumin/Creatinine Ratio, Urine; Future  -     Comprehensive Metabolic Panel; Future  -     CBC Auto Differential; Future  -     Lipid Panel; Future    Hypertension associated with diabetes  -     Hemoglobin A1C; Future  -     Microalbumin/Creatinine Ratio, Urine; Future  -     Comprehensive Metabolic Panel; Future  -     CBC Auto Differential; Future  -     Lipid Panel; Future  Controlled  Low salt diet  Continue current medication  Chronic atrial fibrillation, onset in his 30s, CHADS-VAS score 3  Stable  Continue current medication  Hyperlipidemia associated with type 2 diabetes mellitus  -     Comprehensive Metabolic Panel; Future  -     Lipid Panel; Future  High fiber diet  Gastroesophageal reflux disease without esophagitis  -     Comprehensive Metabolic Panel; Future  -     CBC Auto Differential; Future    Exudative age-related macular degeneration, right eye, with active choroidal neovascularization  Follow up with ophthalmology   Bad odor of  urine  -     Urinalysis; Future  -     Urine culture; Future    Onychomycosis  -     Ambulatory referral/consult to Podiatry; Future    Other orders  -     Influenza - Quadrivalent (Adjuvanted)      Patient will be notified when labs return and further recommendations will be given at that time.

## 2022-03-23 ENCOUNTER — TELEPHONE (OUTPATIENT)
Dept: FAMILY MEDICINE | Facility: CLINIC | Age: 82
End: 2022-03-23
Payer: MEDICARE

## 2022-03-23 ENCOUNTER — LAB VISIT (OUTPATIENT)
Dept: LAB | Facility: HOSPITAL | Age: 82
End: 2022-03-23
Attending: PHYSICIAN ASSISTANT
Payer: MEDICARE

## 2022-03-23 DIAGNOSIS — E11.69 HYPERLIPIDEMIA ASSOCIATED WITH TYPE 2 DIABETES MELLITUS: ICD-10-CM

## 2022-03-23 DIAGNOSIS — E78.5 HYPERLIPIDEMIA ASSOCIATED WITH TYPE 2 DIABETES MELLITUS: ICD-10-CM

## 2022-03-23 DIAGNOSIS — K21.9 GASTROESOPHAGEAL REFLUX DISEASE WITHOUT ESOPHAGITIS: ICD-10-CM

## 2022-03-23 DIAGNOSIS — E11.42 DIABETIC POLYNEUROPATHY ASSOCIATED WITH TYPE 2 DIABETES MELLITUS: ICD-10-CM

## 2022-03-23 DIAGNOSIS — E11.59 HYPERTENSION ASSOCIATED WITH DIABETES: ICD-10-CM

## 2022-03-23 DIAGNOSIS — I15.2 HYPERTENSION ASSOCIATED WITH DIABETES: ICD-10-CM

## 2022-03-23 LAB
ALBUMIN SERPL BCP-MCNC: 4 G/DL (ref 3.5–5.2)
ALP SERPL-CCNC: 69 U/L (ref 55–135)
ALT SERPL W/O P-5'-P-CCNC: 31 U/L (ref 10–44)
ANION GAP SERPL CALC-SCNC: 8 MMOL/L (ref 8–16)
AST SERPL-CCNC: 24 U/L (ref 10–40)
BASOPHILS # BLD AUTO: 0.04 K/UL (ref 0–0.2)
BASOPHILS NFR BLD: 0.5 % (ref 0–1.9)
BILIRUB SERPL-MCNC: 1 MG/DL (ref 0.1–1)
BUN SERPL-MCNC: 13 MG/DL (ref 8–23)
CALCIUM SERPL-MCNC: 9.2 MG/DL (ref 8.7–10.5)
CHLORIDE SERPL-SCNC: 104 MMOL/L (ref 95–110)
CHOLEST SERPL-MCNC: 149 MG/DL (ref 120–199)
CHOLEST/HDLC SERPL: 3.1 {RATIO} (ref 2–5)
CO2 SERPL-SCNC: 28 MMOL/L (ref 23–29)
CREAT SERPL-MCNC: 1.2 MG/DL (ref 0.5–1.4)
DIFFERENTIAL METHOD: ABNORMAL
EOSINOPHIL # BLD AUTO: 0.7 K/UL (ref 0–0.5)
EOSINOPHIL NFR BLD: 8.8 % (ref 0–8)
ERYTHROCYTE [DISTWIDTH] IN BLOOD BY AUTOMATED COUNT: 13.3 % (ref 11.5–14.5)
EST. GFR  (AFRICAN AMERICAN): >60 ML/MIN/1.73 M^2
EST. GFR  (NON AFRICAN AMERICAN): 55.2 ML/MIN/1.73 M^2
ESTIMATED AVG GLUCOSE: 128 MG/DL (ref 68–131)
GLUCOSE SERPL-MCNC: 127 MG/DL (ref 70–110)
HBA1C MFR BLD: 6.1 % (ref 4–5.6)
HCT VFR BLD AUTO: 46.9 % (ref 40–54)
HDLC SERPL-MCNC: 48 MG/DL (ref 40–75)
HDLC SERPL: 32.2 % (ref 20–50)
HGB BLD-MCNC: 15.2 G/DL (ref 14–18)
IMM GRANULOCYTES # BLD AUTO: 0.02 K/UL (ref 0–0.04)
IMM GRANULOCYTES NFR BLD AUTO: 0.3 % (ref 0–0.5)
LDLC SERPL CALC-MCNC: 81.4 MG/DL (ref 63–159)
LYMPHOCYTES # BLD AUTO: 1.8 K/UL (ref 1–4.8)
LYMPHOCYTES NFR BLD: 22.7 % (ref 18–48)
MCH RBC QN AUTO: 31.7 PG (ref 27–31)
MCHC RBC AUTO-ENTMCNC: 32.4 G/DL (ref 32–36)
MCV RBC AUTO: 98 FL (ref 82–98)
MONOCYTES # BLD AUTO: 0.7 K/UL (ref 0.3–1)
MONOCYTES NFR BLD: 8.5 % (ref 4–15)
NEUTROPHILS # BLD AUTO: 4.6 K/UL (ref 1.8–7.7)
NEUTROPHILS NFR BLD: 59.2 % (ref 38–73)
NONHDLC SERPL-MCNC: 101 MG/DL
NRBC BLD-RTO: 0 /100 WBC
PLATELET # BLD AUTO: 220 K/UL (ref 150–450)
PMV BLD AUTO: 11.1 FL (ref 9.2–12.9)
POTASSIUM SERPL-SCNC: 4.8 MMOL/L (ref 3.5–5.1)
PROT SERPL-MCNC: 7.1 G/DL (ref 6–8.4)
RBC # BLD AUTO: 4.79 M/UL (ref 4.6–6.2)
SODIUM SERPL-SCNC: 140 MMOL/L (ref 136–145)
TRIGL SERPL-MCNC: 98 MG/DL (ref 30–150)
WBC # BLD AUTO: 7.74 K/UL (ref 3.9–12.7)

## 2022-03-23 PROCEDURE — 36415 COLL VENOUS BLD VENIPUNCTURE: CPT | Mod: PO | Performed by: PHYSICIAN ASSISTANT

## 2022-03-23 PROCEDURE — 80061 LIPID PANEL: CPT | Performed by: PHYSICIAN ASSISTANT

## 2022-03-23 PROCEDURE — 83036 HEMOGLOBIN GLYCOSYLATED A1C: CPT | Performed by: PHYSICIAN ASSISTANT

## 2022-03-23 PROCEDURE — 80053 COMPREHEN METABOLIC PANEL: CPT | Performed by: PHYSICIAN ASSISTANT

## 2022-03-23 PROCEDURE — 85025 COMPLETE CBC W/AUTO DIFF WBC: CPT | Performed by: PHYSICIAN ASSISTANT

## 2022-03-23 NOTE — TELEPHONE ENCOUNTER
Called and spoke to pt about setting up AWV  Declines for now but will call back if changes his mind

## 2022-03-24 NOTE — PATIENT INSTRUCTIONS
Your Diabetes Foot Care Program    Every day you depend on your feet to keep you moving. But when you have diabetes, your feet need special care. Even a small foot problem can become very serious. So dont take your feet for granted. By working with your diabetes healthcare team, you can learn how to protect your feet and keep them healthy.  Evaluating your feet  An evaluation helps your healthcare provider check the condition of your feet. The evaluation includes a review of your diabetes history and overall health. It may also include a foot exam, X-rays, or other tests. These can help show problems beneath the skin that you cant see or feel.  Medical history  You will be asked about your overall health and any history of foot problems. Youll also discuss your diabetes history, such as whether your blood sugar level has changed over time. It also includes questions about sensations of pain, tingling, pins and needles, or numbness. Your healthcare provider will also want to know if you have high blood pressure and heart disease, or if you smoke. Be sure to mention any medicines (including over-the-counter), supplements, or herbal remedies you take.  Foot exam  A foot exam checks the condition of different parts of your foot. First, your skin and nails are examined for any signs of infection. Blood flow is checked by feeling for the pulses in each foot. You may also have tests to study the nerves in the foot. These include using a small filament (wire) to see how sensitive your feet are. In certain cases, you will be asked to walk a short distance to check for bone, joint, and muscle problems.  Diagnostic tests  If needed, your healthcare provider will suggest certain tests to learn more about your feet. These include:  Doppler tests to measure blood flow in the feet and lower leg.  X-rays, which can show bone or joint problems.  Other imaging tests, such as an MRI (magnetic resonance imaging), bone scan, and CT  (computed tomography) scan. These can help show bone infections.  Other tests, such as vascular tests, which study the blood flow in your feet and legs. You may also have nerve studies to learn how sensitive your feet are.  Creating a foot care program  Based on the evaluation, your healthcare provider will create a foot care program for you. Your program may be as simple as starting a daily self-care routine and changing the types of shoes your wear. It may also involve treating minor foot problems, such as a corn or blister. In some cases, surgery will be needed to treat an infection or mechanical problems, such as hammer toes.  Preventing problems  When you have diabetes, its easier to prevent problems than to treat them later on. So see your healthcare team for regular checkups and foot care. Your healthcare team can also help you learn more about caring for your feet at home. For example, you may be told to avoid walking barefoot. Or you may be told that special footwear is needed to protect your feet.  Have regular checkups  Foot problems can develop quickly. So be sure to follow your healthcare teams schedule for regular checkups. During office visits, take off your shoes and socks as soon as you get in the exam room. Ask your healthcare provider to examine your feet for problems. This will make it easier to find and treat small skin irritations before they get worse. Regular checkups can also help keep track of the blood flow and feeling in your feet. If you have neuropathy (lack of feeling in your feet), you will need to have checkups more often.  Learn about self-care  The more you know about diabetes and your feet, the easier it will be to prevent problems. Members of your healthcare team can teach you how to inspect your feet and teach you to look for warning signs. They can also give you other foot care tips. During office visits, be sure to ask any questions you have.  Date Last Reviewed: 7/1/2016  ©  3727-2705 [employer]. [address] [place] [address], [place] [address]. All rights reserved. This information is not intended as a substitute for professional medical care. Always follow your healthcare professional's instructions.       Nail Fungal Infection  A nail fungal infection changes the way fingernails and toenails look. They may thicken, discolor, change shape, or split. This condition is hard to treat because nails grow slowly and have limited blood supply. The infection often comes back after treatment.  There are 2 types of medicines used to treat this condition:  Topical anti-fungal medicines. These are applied to the surface of the skin and nail area. These medicines are not very effective because they cant get deep into the nail.  Oral antifungal medicines. These medicines work better because they go into the nail from the inside out. But the infection may still come back. It may take 9 to 12 months for your nail to look normal again. This means you are cured. You can repeat treatment if needed. Most people take these medicines without any problems. It is rare to stop therapy because of side effects. But your healthcare provider may give you some monitoring tests. Talk about possible side effects with your provider before starting treatment.  If medicines fail, the nail can be removed surgically or chemically. These methods physically remove the fungus from the body. This helps medical treatment be more effective.  Home care  Use medicines exactly as directed for as long as directed. Treating a fungal infection can take longer than other kinds of infections.  Smoking is a risk factor for fungal infection. This is one more reason to quit.  Wear absorbent socks, and shoes that let your feet breathe. Sweaty feet increase your risk of fungal infection. They also make an existing infection harder to treat.  Use footwear when in damp public places like swimming pools, gyms, and shower rooms. This  will help you avoid the fungus that grows there.  Don't share nail clippers or scissors with others.  Follow-up care  Follow up with your healthcare provider, or as advised.  When to seek medical advice  Call your healthcare provider right away if any of these occur:  Skin by the nail becomes red, swollen, painful, or drains pus (a creamy yellow or white liquid)  Side effects from oral anti-fungal medicines  Date Last Reviewed: 8/1/2016  © 6935-6472 [employer]. [address] [place] [address]. All rights reserved. This information is not intended as a substitute for professional medical care. Always follow your healthcare professional's instructions.

## 2022-03-28 ENCOUNTER — OFFICE VISIT (OUTPATIENT)
Dept: PODIATRY | Facility: CLINIC | Age: 82
End: 2022-03-28
Payer: MEDICARE

## 2022-03-28 VITALS — BODY MASS INDEX: 27.7 KG/M2 | HEIGHT: 73 IN | WEIGHT: 209 LBS | HEART RATE: 81 BPM | OXYGEN SATURATION: 97 %

## 2022-03-28 DIAGNOSIS — B35.3 TINEA PEDIS OF RIGHT FOOT: ICD-10-CM

## 2022-03-28 DIAGNOSIS — I87.2 VENOUS INSUFFICIENCY OF BOTH LOWER EXTREMITIES: ICD-10-CM

## 2022-03-28 DIAGNOSIS — L60.2 OG (ONYCHOGRYPHOSIS): ICD-10-CM

## 2022-03-28 DIAGNOSIS — L60.3 ONYCHODYSTROPHY: ICD-10-CM

## 2022-03-28 DIAGNOSIS — E11.42 DIABETIC POLYNEUROPATHY ASSOCIATED WITH TYPE 2 DIABETES MELLITUS: Primary | ICD-10-CM

## 2022-03-28 DIAGNOSIS — E11.9 ENCOUNTER FOR DIABETIC FOOT EXAM: ICD-10-CM

## 2022-03-28 PROCEDURE — 11721 ROUTINE FOOT CARE: ICD-10-PCS | Mod: Q9,S$GLB,, | Performed by: PODIATRIST

## 2022-03-28 PROCEDURE — 99214 OFFICE O/P EST MOD 30 MIN: CPT | Mod: 25,S$GLB,, | Performed by: PODIATRIST

## 2022-03-28 PROCEDURE — 99214 PR OFFICE/OUTPT VISIT, EST, LEVL IV, 30-39 MIN: ICD-10-PCS | Mod: 25,S$GLB,, | Performed by: PODIATRIST

## 2022-03-28 PROCEDURE — 11721 DEBRIDE NAIL 6 OR MORE: CPT | Mod: Q9,S$GLB,, | Performed by: PODIATRIST

## 2022-03-28 RX ORDER — CLOTRIMAZOLE AND BETAMETHASONE DIPROPIONATE 10; .64 MG/G; MG/G
CREAM TOPICAL 2 TIMES DAILY
Qty: 45 G | Refills: 2 | Status: SHIPPED | OUTPATIENT
Start: 2022-03-28 | End: 2022-12-15 | Stop reason: ALTCHOICE
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
## 2022-05-02 DIAGNOSIS — M79.2 PERIPHERAL NEUROPATHIC PAIN: ICD-10-CM

## 2022-05-02 NOTE — TELEPHONE ENCOUNTER
Refill Routing Note   Medication(s) are not appropriate for processing by [facility] for the following reason(s):      - Non-participating provider    [facility] action(s):  Route          Medication reconciliation completed: No     Appointments  past 12m or future 3m with PCP    Date Provider   Last Visit   6/2/2020 Isiah Crespo MD   Next Visit   Visit date not found Isiah Crespo MD   ED visits in past 90 days: 0        Note composed:2:06 PM 05/02/2022

## 2022-05-03 RX ORDER — DILTIAZEM HYDROCHLORIDE 300 MG/1
CAPSULE, EXTENDED RELEASE ORAL
Qty: 90 CAPSULE | Refills: 0 | Status: SHIPPED | OUTPATIENT
Start: 2022-05-03 | End: 2022-07-29

## 2022-05-03 RX ORDER — FOLIC ACID-PYRIDOXINE-CYANOCOBALAMIN TAB 2.5-25-2 MG 2.5-25-2 MG
TAB ORAL
Qty: 90 TABLET | Refills: 0 | Status: SHIPPED | OUTPATIENT
Start: 2022-05-03 | End: 2022-07-29

## 2022-05-06 RX ORDER — OMEPRAZOLE 20 MG/1
CAPSULE, DELAYED RELEASE ORAL
Qty: 90 CAPSULE | Refills: 3 | Status: SHIPPED | OUTPATIENT
Start: 2022-05-06 | End: 2023-04-25

## 2022-05-06 NOTE — TELEPHONE ENCOUNTER
No new care gaps identified.  [facility] Embedded Care Gaps. Reference number: [id number]. 5/06/2022   9:03:03 AM CDT

## 2022-05-06 NOTE — TELEPHONE ENCOUNTER
Refill Routing Note   Medication(s) are not appropriate for processing by [facility] for the following reason(s):      - Patient has not been seen in over 15 months by PCP    [facility] action(s):  Defer          Medication reconciliation completed: No     Appointments  past 12m or future 3m with PCP    Date Provider   Last Visit   6/2/2020 Isiah Crespo MD   Next Visit   8/8/2022 Isiah Crespo MD   ED visits in past 90 days: 0        Note composed:1:22 PM 05/06/2022

## 2022-07-26 DIAGNOSIS — M79.2 PERIPHERAL NEUROPATHIC PAIN: ICD-10-CM

## 2022-07-26 NOTE — TELEPHONE ENCOUNTER
Care Due:                  Date            Visit Type   Department     Provider  --------------------------------------------------------------------------------    Last Visit: None Found      None         None Found                              EP -                              PRIMARY      [facility]  Next Visit: 08-      CARE ([facility])   MEDICINE       Isiah Crespo                                                            Last  Test          Frequency    Reason                     Performed    Due Date  --------------------------------------------------------------------------------    HBA1C.......  6 months...  repaglinide..............  03- 09-    [facility] Embedded Care Gaps. Reference number: [id number]. 7/26/2022   8:23:07 AM CDT

## 2022-07-29 RX ORDER — FOLIC ACID-PYRIDOXINE-CYANOCOBALAMIN TAB 2.5-25-2 MG 2.5-25-2 MG
TAB ORAL
Qty: 90 TABLET | Refills: 3 | Status: SHIPPED | OUTPATIENT
Start: 2022-07-29 | End: 2023-04-25

## 2022-07-29 RX ORDER — DILTIAZEM HYDROCHLORIDE 300 MG/1
CAPSULE, EXTENDED RELEASE ORAL
Qty: 90 CAPSULE | Refills: 3 | Status: SHIPPED | OUTPATIENT
Start: 2022-07-29 | End: 2023-04-25

## 2022-08-08 ENCOUNTER — OFFICE VISIT (OUTPATIENT)
Dept: FAMILY MEDICINE | Facility: CLINIC | Age: 82
End: 2022-08-08
Payer: MEDICARE

## 2022-08-08 VITALS
WEIGHT: 207.44 LBS | HEART RATE: 60 BPM | HEIGHT: 73 IN | OXYGEN SATURATION: 95 % | DIASTOLIC BLOOD PRESSURE: 78 MMHG | TEMPERATURE: 98 F | SYSTOLIC BLOOD PRESSURE: 122 MMHG | BODY MASS INDEX: 27.49 KG/M2

## 2022-08-08 DIAGNOSIS — E78.5 HYPERLIPIDEMIA ASSOCIATED WITH TYPE 2 DIABETES MELLITUS: ICD-10-CM

## 2022-08-08 DIAGNOSIS — I48.20 CHRONIC ATRIAL FIBRILLATION: ICD-10-CM

## 2022-08-08 DIAGNOSIS — E11.69 HYPERLIPIDEMIA ASSOCIATED WITH TYPE 2 DIABETES MELLITUS: ICD-10-CM

## 2022-08-08 DIAGNOSIS — E11.59 HYPERTENSION ASSOCIATED WITH DIABETES: ICD-10-CM

## 2022-08-08 DIAGNOSIS — I34.0 NON-RHEUMATIC MITRAL REGURGITATION: ICD-10-CM

## 2022-08-08 DIAGNOSIS — E11.42 DIABETIC POLYNEUROPATHY ASSOCIATED WITH TYPE 2 DIABETES MELLITUS: Primary | ICD-10-CM

## 2022-08-08 DIAGNOSIS — I15.2 HYPERTENSION ASSOCIATED WITH DIABETES: ICD-10-CM

## 2022-08-08 DIAGNOSIS — M21.942 HAND DEFORMITY, ACQUIRED, LEFT: ICD-10-CM

## 2022-08-08 PROCEDURE — 99999 PR PBB SHADOW E&M-EST. PATIENT-LVL IV: CPT | Mod: PBBFAC,,, | Performed by: FAMILY MEDICINE

## 2022-08-08 PROCEDURE — 99214 OFFICE O/P EST MOD 30 MIN: CPT | Mod: PBBFAC,PO | Performed by: FAMILY MEDICINE

## 2022-08-08 PROCEDURE — 99214 OFFICE O/P EST MOD 30 MIN: CPT | Mod: S$PBB,,, | Performed by: FAMILY MEDICINE

## 2022-08-08 PROCEDURE — 99214 PR OFFICE/OUTPT VISIT, EST, LEVL IV, 30-39 MIN: ICD-10-PCS | Mod: S$PBB,,, | Performed by: FAMILY MEDICINE

## 2022-08-08 PROCEDURE — 99999 PR PBB SHADOW E&M-EST. PATIENT-LVL IV: ICD-10-PCS | Mod: PBBFAC,,, | Performed by: FAMILY MEDICINE

## 2022-08-08 RX ORDER — ALBUTEROL SULFATE 90 UG/1
AEROSOL, METERED RESPIRATORY (INHALATION)
COMMUNITY
Start: 2022-06-08 | End: 2023-03-16 | Stop reason: ALTCHOICE

## 2022-08-08 RX ORDER — LANCETS 26 GAUGE
1 EACH MISCELLANEOUS 2 TIMES DAILY WITH MEALS
Qty: 1 EACH | Refills: 0 | Status: SHIPPED | OUTPATIENT
Start: 2022-08-08 | End: 2023-10-03

## 2022-08-08 RX ORDER — GABAPENTIN 100 MG/1
100 CAPSULE ORAL 3 TIMES DAILY
COMMUNITY
Start: 2022-08-05 | End: 2022-12-15 | Stop reason: ALTCHOICE

## 2022-08-08 RX ORDER — DEXTROSE 4 G
1 TABLET,CHEWABLE ORAL 2 TIMES DAILY WITH MEALS
Qty: 1 EACH | Refills: 0 | Status: SHIPPED | OUTPATIENT
Start: 2022-08-08 | End: 2023-10-03

## 2022-08-08 RX ORDER — LANCETS
1 EACH MISCELLANEOUS 2 TIMES DAILY WITH MEALS
Qty: 100 EACH | Refills: 11 | Status: SHIPPED | OUTPATIENT
Start: 2022-08-08

## 2022-08-08 NOTE — PROGRESS NOTES
Subjective:       Patient ID: Domitila Walters is a 82 y.o. male.    Chief Complaint: Follow-up    Mr. Walters is a 82 year old patient with DM, HTN, and chronic afib. No polyuria, polydipsia, blurry vision, chest pain, dyspnea or claudication.  No foot burning, numbness or pain. Taking medication compliantly without noted sided effects. Follows diet fairly well. Home glucose monitoring in the range of 110.  Has seen diabetic educator. Last eye exam within the year was reportedly negative. Last foot exam negative within the past year.   The patient is due for foot exam today. The patient is followed closely by eye doctor, Dr. Caruso.    Follow-up  Pertinent negatives include no abdominal pain, arthralgias, chest pain, coughing, fever, headaches or myalgias.     Review of patient's allergies indicates:  No Known Allergies      Current Outpatient Medications:     beta-carotene,A,-vits C,E/mins (OCUVITE ORAL), Take by mouth., Disp: , Rfl:     calcium-vitamin D3 (OS-BROOKLYNN 500 + D3) 500 mg-5 mcg (200 unit) per tablet, Take 1 tablet by mouth 2 (two) times daily with meals., Disp: , Rfl:     CARTIA  mg 24 hr capsule, TAKE ONE CAPSULE BY MOUTH EVERY DAY, Disp: 90 capsule, Rfl: 3    cholecalciferol, vitamin D3, 1,000 unit capsule, Take 1 capsule (1,000 Units total) by mouth once daily., Disp: 90 capsule, Rfl: 11    clotrimazole-betamethasone 1-0.05% (LOTRISONE) cream, Apply topically 2 (two) times daily., Disp: 45 g, Rfl: 2    ELIQUIS 5 mg Tab, TAKE ONE TABLET BY MOUTH TWICE DAILY, Disp: 180 tablet, Rfl: 3    FOLBIC 2.5-25-2 mg Tab, TAKE ONE TABLET BY MOUTH DAILY, Disp: 90 tablet, Rfl: 3    gabapentin (NEURONTIN) 100 MG capsule, Take 100 mg by mouth 3 (three) times daily., Disp: , Rfl:     omeprazole (PRILOSEC) 20 MG capsule, TAKE ONE CAPSULE BY MOUTH ONCE DAILY, Disp: 90 capsule, Rfl: 3    pravastatin (PRAVACHOL) 40 MG tablet, TAKE ONE TABLET BY MOUTH ONCE DAILY, Disp: 90 tablet, Rfl: 3    repaglinide  (PRANDIN) 1 MG tablet, TAKE ONE TABLET BY MOUTH TWICE DAILY BEFORE MEALS, Disp: 180 tablet, Rfl: 3    albuterol (PROVENTIL/VENTOLIN HFA) 90 mcg/actuation inhaler, SMARTSI-2 Puff(s) Via Inhaler Every 4 Hours PRN, Disp: , Rfl:     blood sugar diagnostic Strp, 1 strip by Misc.(Non-Drug; Combo Route) route 2 (two) times daily with meals., Disp: 100 strip, Rfl: 11    blood-glucose meter Misc, 1 Device by Misc.(Non-Drug; Combo Route) route 2 (two) times daily with meals., Disp: 1 each, Rfl: 0    lancets Misc, 1 each by Misc.(Non-Drug; Combo Route) route 2 (two) times daily with meals., Disp: 100 each, Rfl: 11    lancing device with lancets Kit, 1 Device by Misc.(Non-Drug; Combo Route) route 2 (two) times daily with meals., Disp: 1 each, Rfl: 0    Lab Results   Component Value Date    WBC 7.74 2022    HGB 15.2 2022    HCT 46.9 2022     2022    CHOL 149 2022    TRIG 98 2022    HDL 48 2022    ALT 31 2022    AST 24 2022     2022    K 4.8 2022     2022    CREATININE 1.2 2022    BUN 13 2022    CO2 28 2022    TSH 1.462 2018    PSA 2.2 2016    INR 1.0 2017    HGBA1C 6.1 (H) 2022       Review of Systems   Constitutional: Negative for activity change, appetite change and fever.   HENT: Negative for postnasal drip, rhinorrhea and sinus pressure.    Eyes: Negative for visual disturbance.   Respiratory: Negative for cough and shortness of breath.    Cardiovascular: Negative for chest pain.   Gastrointestinal: Negative for abdominal distention and abdominal pain.   Genitourinary: Negative for difficulty urinating and dysuria.        Urine odor   Musculoskeletal: Negative for arthralgias and myalgias.   Neurological: Negative for headaches.   Hematological: Negative for adenopathy.   Psychiatric/Behavioral: The patient is not nervous/anxious.        Objective:      Physical Exam  Constitutional:        Appearance: Normal appearance.   HENT:      Head: Normocephalic and atraumatic.   Eyes:      Conjunctiva/sclera: Conjunctivae normal.   Cardiovascular:      Rate and Rhythm: Normal rate. Rhythm irregular.      Pulses:           Dorsalis pedis pulses are 1+ on the right side and 1+ on the left side.        Posterior tibial pulses are 1+ on the right side and 1+ on the left side.   Pulmonary:      Effort: Pulmonary effort is normal. No respiratory distress.      Breath sounds: Normal breath sounds. No wheezing.   Abdominal:      General: There is no distension.      Palpations: There is no mass.      Tenderness: There is no abdominal tenderness.   Musculoskeletal:      Right lower leg: No edema.      Left lower leg: No edema.   Feet:      Right foot:      Protective Sensation: 8 sites tested. 8 sites sensed.      Skin integrity: Dry skin present. No ulcer or blister.      Toenail Condition: Right toenails are abnormally thick. Fungal disease present.     Left foot:      Protective Sensation: 8 sites tested. 8 sites sensed.      Skin integrity: Dry skin present. No ulcer or blister.      Toenail Condition: Left toenails are abnormally thick. Fungal disease present.  Lymphadenopathy:      Cervical: No cervical adenopathy.   Skin:     Findings: No erythema.   Neurological:      Mental Status: He is alert and oriented to person, place, and time.   Psychiatric:         Behavior: Behavior normal.         Assessment:       1. Diabetic polyneuropathy associated with type 2 diabetes mellitus        Plan:   Domitila was seen today for annual exam.    Diagnoses and all orders for this visit:    Diabetic polyneuropathy associated with type 2 diabetes mellitus  -     Hemoglobin A1C; Future  -     Microalbumin/Creatinine Ratio, Urine; Future  -     Comprehensive Metabolic Panel; Future  -     CBC Auto Differential; Future  -     Lipid Panel; Future    Hypertension associated with diabetes  -     Hemoglobin A1C; Future  -      Microalbumin/Creatinine Ratio, Urine; Future  -     Comprehensive Metabolic Panel; Future  -     CBC Auto Differential; Future  -     Lipid Panel; Future  Controlled  Low salt diet  Continue current medication  Chronic atrial fibrillation, onset in his 30s, CHADS-VAS score 3  Stable, Keren Dr Galvan  Continue current medication  Hyperlipidemia associated with type 2 diabetes mellitus  -     Comprehensive Metabolic Panel; Future  -     Lipid Panel; Future  High fiber diet  Bad odor of urine  -     Urinalysis; Future  -     Urine culture; Future    Orders Placed This Encounter   Procedures    HEMOGLOBIN A1C     Standing Status:   Future     Standing Expiration Date:   10/7/2023    MICROALBUMIN / CREATININE RATIO URINE     Standing Status:   Future     Standing Expiration Date:   10/7/2023     Order Specific Question:   Specimen Source     Answer:   Urine    Ambulatory referral/consult to Diabetes Education     Standing Status:   Future     Standing Expiration Date:   8/8/2023     Referral Priority:   Routine     Referral Type:   Consultation     Referral Reason:   Specialty Services Required     Requested Specialty:   Diabetes     Number of Visits Requested:   1     Expiration Date:   8/8/2023       Patient will be notified when labs return and further recommendations will be given at that time.     Discussed health maintenance guidelines appropriate for age.

## 2022-12-15 ENCOUNTER — OFFICE VISIT (OUTPATIENT)
Dept: FAMILY MEDICINE | Facility: CLINIC | Age: 82
End: 2022-12-15
Payer: MEDICARE

## 2022-12-15 VITALS
TEMPERATURE: 98 F | WEIGHT: 208.75 LBS | HEIGHT: 73 IN | SYSTOLIC BLOOD PRESSURE: 130 MMHG | OXYGEN SATURATION: 95 % | BODY MASS INDEX: 27.67 KG/M2 | DIASTOLIC BLOOD PRESSURE: 82 MMHG | HEART RATE: 70 BPM

## 2022-12-15 DIAGNOSIS — E11.59 CONTROLLED TYPE 2 DIABETES MELLITUS WITH OTHER CIRCULATORY COMPLICATION, WITHOUT LONG-TERM CURRENT USE OF INSULIN: ICD-10-CM

## 2022-12-15 DIAGNOSIS — E11.8 TYPE 2 DIABETES MELLITUS WITH COMPLICATION, WITHOUT LONG-TERM CURRENT USE OF INSULIN: ICD-10-CM

## 2022-12-15 PROCEDURE — 99999 PR PBB SHADOW E&M-EST. PATIENT-LVL V: CPT | Mod: PBBFAC,,, | Performed by: FAMILY MEDICINE

## 2022-12-15 PROCEDURE — 99215 OFFICE O/P EST HI 40 MIN: CPT | Mod: PBBFAC,PO | Performed by: FAMILY MEDICINE

## 2022-12-15 PROCEDURE — 99214 PR OFFICE/OUTPT VISIT, EST, LEVL IV, 30-39 MIN: ICD-10-PCS | Mod: S$PBB,,, | Performed by: FAMILY MEDICINE

## 2022-12-15 PROCEDURE — 99214 OFFICE O/P EST MOD 30 MIN: CPT | Mod: S$PBB,,, | Performed by: FAMILY MEDICINE

## 2022-12-15 PROCEDURE — 99999 PR PBB SHADOW E&M-EST. PATIENT-LVL V: ICD-10-PCS | Mod: PBBFAC,,, | Performed by: FAMILY MEDICINE

## 2022-12-15 RX ORDER — REPAGLINIDE 0.5 MG/1
0.5 TABLET ORAL
Qty: 180 TABLET | Refills: 3 | Status: SHIPPED | OUTPATIENT
Start: 2022-12-15 | End: 2023-06-16 | Stop reason: ALTCHOICE

## 2023-01-04 NOTE — PROGRESS NOTES
Subjective:       Patient ID: Domitila Walters is a 82 y.o. male.    Chief Complaint: Follow-up (No concerns)    Mr. Walters is a 82 year old patient with DM, HTN, and chronic afib. No polyuria, polydipsia, blurry vision, chest pain, dyspnea or claudication.  No foot burning, numbness or pain. Taking medication compliantly without noted sided effects. Follows diet fairly well. Home glucose monitoring in the range of 110.  Has seen diabetic educator. Last eye exam within the year was reportedly negative. Last foot exam negative within the past year.Lab Results       Component                Value               Date                       HGBA1C                   6.1 (H)             2022               T. The patient is followed closely by eye doctor, Dr. Caruso.    Follow-up  This is a chronic problem. Associated symptoms include arthralgias. Pertinent negatives include no abdominal pain, chest pain, coughing, fever, headaches or myalgias.   Review of patient's allergies indicates:  No Known Allergies      Current Outpatient Medications:     ELIQUIS 5 mg Tab, TAKE ONE TABLET BY MOUTH TWICE DAILY, Disp: 180 tablet, Rfl: 3    FOLBIC 2.5-25-2 mg Tab, TAKE ONE TABLET BY MOUTH DAILY, Disp: 90 tablet, Rfl: 3    omeprazole (PRILOSEC) 20 MG capsule, TAKE ONE CAPSULE BY MOUTH ONCE DAILY, Disp: 90 capsule, Rfl: 3    pravastatin (PRAVACHOL) 40 MG tablet, TAKE ONE TABLET BY MOUTH ONCE DAILY, Disp: 90 tablet, Rfl: 3    albuterol (PROVENTIL/VENTOLIN HFA) 90 mcg/actuation inhaler, SMARTSI-2 Puff(s) Via Inhaler Every 4 Hours PRN, Disp: , Rfl:     beta-carotene,A,-vits C,E/mins (OCUVITE ORAL), Take by mouth., Disp: , Rfl:     blood sugar diagnostic Strp, 1 strip by Misc.(Non-Drug; Combo Route) route 2 (two) times daily with meals., Disp: 100 strip, Rfl: 11    blood-glucose meter Misc, 1 Device by Misc.(Non-Drug; Combo Route) route 2 (two) times daily with meals., Disp: 1 each, Rfl: 0    calcium-vitamin D3 (OS-BROOKLYNN 500 + D3) 500  mg-5 mcg (200 unit) per tablet, Take 1 tablet by mouth 2 (two) times daily with meals., Disp: , Rfl:     CARTIA  mg 24 hr capsule, TAKE ONE CAPSULE BY MOUTH EVERY DAY, Disp: 90 capsule, Rfl: 3    cholecalciferol, vitamin D3, 1,000 unit capsule, Take 1 capsule (1,000 Units total) by mouth once daily., Disp: 90 capsule, Rfl: 11    lancets Misc, 1 each by Misc.(Non-Drug; Combo Route) route 2 (two) times daily with meals., Disp: 100 each, Rfl: 11    lancing device with lancets Kit, 1 Device by Misc.(Non-Drug; Combo Route) route 2 (two) times daily with meals., Disp: 1 each, Rfl: 0    repaglinide (PRANDIN) 0.5 MG tablet, Take 1 tablet (0.5 mg total) by mouth 2 (two) times daily before meals., Disp: 180 tablet, Rfl: 3    Lab Results   Component Value Date    WBC 7.74 03/23/2022    HGB 15.2 03/23/2022    HCT 46.9 03/23/2022     03/23/2022    CHOL 149 03/23/2022    TRIG 98 03/23/2022    HDL 48 03/23/2022    ALT 31 03/23/2022    AST 24 03/23/2022     03/23/2022    K 4.8 03/23/2022     03/23/2022    CREATININE 1.2 03/23/2022    BUN 13 03/23/2022    CO2 28 03/23/2022    TSH 1.462 05/31/2018    PSA 2.2 01/29/2016    INR 1.0 09/28/2017    HGBA1C 6.1 (H) 03/23/2022       Review of Systems   Constitutional:  Negative for activity change, appetite change and fever.   HENT:  Negative for postnasal drip, rhinorrhea and sinus pressure.    Eyes:  Positive for visual disturbance.   Respiratory:  Negative for cough and shortness of breath.    Cardiovascular:  Negative for chest pain.   Gastrointestinal:  Negative for abdominal distention and abdominal pain.   Genitourinary:  Positive for urgency. Negative for difficulty urinating and dysuria.        Urine odor   Musculoskeletal:  Positive for arthralgias and back pain. Negative for myalgias.   Neurological:  Negative for headaches.   Hematological:  Negative for adenopathy.   Psychiatric/Behavioral:  The patient is not nervous/anxious.      Objective:       Physical Exam  Constitutional:       Appearance: Normal appearance.   HENT:      Head: Normocephalic and atraumatic.   Eyes:      Conjunctiva/sclera: Conjunctivae normal.   Cardiovascular:      Rate and Rhythm: Normal rate. Rhythm irregular.      Pulses:           Dorsalis pedis pulses are 1+ on the right side and 1+ on the left side.        Posterior tibial pulses are 1+ on the right side and 1+ on the left side.   Pulmonary:      Effort: Pulmonary effort is normal. No respiratory distress.      Breath sounds: Normal breath sounds. No wheezing.   Abdominal:      General: There is no distension.      Palpations: There is no mass.      Tenderness: There is no abdominal tenderness.   Musculoskeletal:      Right lower leg: No edema.      Left lower leg: No edema.   Feet:      Right foot:      Protective Sensation: 8 sites tested.  8 sites sensed.      Skin integrity: Dry skin present. No ulcer or blister.      Toenail Condition: Right toenails are abnormally thick. Fungal disease present.     Left foot:      Protective Sensation: 8 sites tested.  8 sites sensed.      Skin integrity: Dry skin present. No ulcer or blister.      Toenail Condition: Left toenails are abnormally thick. Fungal disease present.  Lymphadenopathy:      Cervical: No cervical adenopathy.   Skin:     Findings: No erythema.   Neurological:      Mental Status: He is alert and oriented to person, place, and time.   Psychiatric:         Behavior: Behavior normal.       Assessment:       1. Controlled type 2 diabetes mellitus with other circulatory complication, without long-term current use of insulin    2. Type 2 diabetes mellitus with complication, without long-term current use of insulin        Plan:   Domitila was seen today for annual exam.  Controlled type 2 diabetes mellitus with other circulatory complication, without long-term current use of insulin  -     repaglinide (PRANDIN) 0.5 MG tablet; Take 1 tablet (0.5 mg total) by mouth 2 (two) times  daily before meals.  Dispense: 180 tablet; Refill: 3    Type 2 diabetes mellitus with complication, without long-term current use of insulin  -     repaglinide (PRANDIN) 0.5 MG tablet; Take 1 tablet (0.5 mg total) by mouth 2 (two) times daily before meals.  Dispense: 180 tablet; Refill: 3  -     Ambulatory referral/consult to Diabetes Education; Future; Expected date: 12/22/2022  -     Microalbumin/Creatinine Ratio, Urine; Future; Expected date: 12/15/2022  -     Basic Metabolic Panel; Future; Expected date: 03/15/2023  -     Hemoglobin A1c; Future; Expected date: 12/15/2022      Review plate method  Review foods in home  Discuss food labels  Refer to exercise program  Assist with medical visit preparation  Review patient results  Review patient education about results  Review chronic disease interventions (see specific disease intervention)  Assess knowledge level  Provide and discuss information/education material  Encourage communication with physician  Monitor compliance  Educate on risk of non-compliance  Give food and resource list  Review patient education material (see patient instructions)  Review patient education material (see patient instructions)  Refer to exercise program  Review stress management techniques  Review patient education material (see patient instructions)  Assess support system  Discuss stress management techniques  Review normal ranges for labs    Diagnoses and all orders for this visit:    Diabetic polyneuropathy associated with type 2 diabetes mellitus  -     Hemoglobin A1C; Future  -     Microalbumin/Creatinine Ratio, Urine; Future  -     Comprehensive Metabolic Panel; Future  -     CBC Auto Differential; Future  -     Lipid Panel; Future    Hypertension associated with diabetes  -     Hemoglobin A1C; Future  -     Microalbumin/Creatinine Ratio, Urine; Future  -     Comprehensive Metabolic Panel; Future  -     CBC Auto Differential; Future  -     Lipid Panel; Future  Controlled  Low  salt diet  Continue current medication  Chronic atrial fibrillation, onset in his 30s, CHADS-VAS score 3  Stable, Keren Dr Galvan  Continue current medication  Hyperlipidemia associated with type 2 diabetes mellitus  -     Comprehensive Metabolic Panel; Future  -     Lipid Panel; Future  High fiber diet  Bad odor of urine  -     Urinalysis; Future  -     Urine culture; Future    Orders Placed This Encounter   Procedures    Microalbumin/Creatinine Ratio, Urine     Standing Status:   Future     Standing Expiration Date:   2/13/2024     Order Specific Question:   Specimen Source     Answer:   Urine    Basic Metabolic Panel     Standing Status:   Future     Standing Expiration Date:   2/13/2024    Hemoglobin A1c     Standing Status:   Future     Standing Expiration Date:   2/13/2024    Ambulatory referral/consult to Diabetes Education     Standing Status:   Future     Standing Expiration Date:   12/15/2023     Referral Priority:   Routine     Referral Type:   Consultation     Referral Reason:   Specialty Services Required     Requested Specialty:   Diabetes     Number of Visits Requested:   1     Expiration Date:   12/15/2023       Patient will be notified when labs return and further recommendations will be given at that time.     Discussed health maintenance guidelines appropriate for age.  Discussed health maintenance guidelines appropriate for age.

## 2023-01-09 ENCOUNTER — TELEPHONE (OUTPATIENT)
Dept: DIABETES | Facility: CLINIC | Age: 83
End: 2023-01-09
Payer: MEDICARE

## 2023-01-10 ENCOUNTER — CLINICAL SUPPORT (OUTPATIENT)
Dept: DIABETES | Facility: CLINIC | Age: 83
End: 2023-01-10
Payer: MEDICARE

## 2023-01-10 VITALS — BODY MASS INDEX: 26.84 KG/M2 | HEIGHT: 73 IN | WEIGHT: 202.5 LBS

## 2023-01-10 DIAGNOSIS — E11.8 TYPE 2 DIABETES MELLITUS WITH COMPLICATION, WITHOUT LONG-TERM CURRENT USE OF INSULIN: ICD-10-CM

## 2023-01-10 PROCEDURE — 99999 PR PBB SHADOW E&M-EST. PATIENT-LVL II: CPT | Mod: PBBFAC,,, | Performed by: DIETITIAN, REGISTERED

## 2023-01-10 PROCEDURE — 99999 PR PBB SHADOW E&M-EST. PATIENT-LVL II: ICD-10-PCS | Mod: PBBFAC,,, | Performed by: DIETITIAN, REGISTERED

## 2023-01-10 PROCEDURE — G0108 DIAB MANAGE TRN  PER INDIV: HCPCS | Mod: PBBFAC,PO | Performed by: DIETITIAN, REGISTERED

## 2023-01-10 PROCEDURE — 99212 OFFICE O/P EST SF 10 MIN: CPT | Mod: PBBFAC,PO | Performed by: DIETITIAN, REGISTERED

## 2023-01-10 NOTE — PROGRESS NOTES
"Diabetes Care Specialist Progress Note  Author: Dorene Segura RD  Date: 1/10/2023    Program Intake  Reason for Diabetes Program Visit:: Initial Diabetes Assessment  Current diabetes risk level:: low  In the last 12 months, have you:: none  Permission to speak with others about care:: yes (Wife Zuleyka)    Lab Results   Component Value Date    HGBA1C 6.1 (H) 03/23/2022       Clinical    Problem Review  Reviewed Problem List with Patient: yes  Active comorbidities affecting diabetes self-care.: yes  Comorbidities: Hypertension, Chronic Kidney Disease, Cardiovascular Disease  Reviewed health maintenance: yes    Clinical Assessment  Current Diabetes Treatment: Oral Medication (.25 mg Prandin BID before meals.)  Have you ever experienced hypoglycemia (low blood sugar)?: no  Have you ever experienced hyperglycemia (high blood sugar)?: yes  In the last month, how often have you experienced high blood sugar?: other (see comments) (Patient reports he once saw his blood glucose "shoot up to 220.")  Are you able to tell when your blood sugar is high?: No (comment)  Have you ever been hospitalized because your blood sugar was high?: no    Medication Information  How do you obtain your medications?: Patient drives  How many days a week do you miss your medications?: 2 (Patient was having difficulty remebering to take Prandin so far in advance before meals.)  Do you use a pill box or medication chart to help you manage your medications?: No  Do you sometimes have difficulty refilling your medications?: No  Medication adherence impacting ability to self-manage diabetes?: No (Believes he is on track now that he will take it 30 minutes before a meal.)    Labs  Do you have regular lab work to monitor your medications?: Yes  Type of Regular Lab Work: A1c  Where do you get your labs drawn?: [facility]  Lab Compliance Barriers: No    Nutritional Status  Diet: Regular  Meal Plan 24 Hour Recall: Breakfast, Lunch, Dinner  Meal Plan 24 Hour " Recall - Breakfast: eggs, sausage, cervantes, grits, toast, fruit juice  Meal Plan 24 Hour Recall - Lunch: protein, vegetable, side dish  Meal Plan 24 Hour Recall - Dinner: sandwich  Change in appetite?: No  Dentation:: Intact  Recent Changes in Weight: Weight Loss (6 lbs weight loss between 12/15 and 1/10.  May be discrempancy between scales.)  Was weight loss intentional or unintentional?: Unintentional  Current nutritional status an area of need that is impacting patient's ability to self-manage diabetes?: No    Additional Social History    Support  Does anyone support you with your diabetes care?: yes  Who supports you?: spouse, self  Who takes you to your medical appointments?: self  Does the current support meet the patient's needs?: Yes  Is Support an area impacting ability to self-manage diabetes?: No    Access to Mass Media & Technology  Does the patient have access to any of the following devices or technologies?: Internet Access  Media or technology needs impacting ability to self-manage diabetes?: No    Cognitive/Behavioral Health  Alert and Oriented: Yes  Difficulty Thinking: No  Requires Prompting: No  Requires assistance for routine expression?: No  Cognitive or behavioral barriers impacting ability to self-manage diabetes?: No    Culture/Yarsanism  Culture or Christian beliefs that may impact ability to access healthcare: No    Communication  Language preference: English  Hearing Problems: No  Vision Problems: Yes  Vision problem type:: Decreased Vision  Vision Assistance: Glasses  Communication needs impacting ability to self-manage diabetes?: No    Health Literacy  Preferred Learning Method: Face to Face  How often do you need to have someone help you read instructions, pamphlets, or written material from your doctor or pharmacy?: Never  Health literacy needs impacting ability to self-manage diabetes?: No      Diabetes Self-Management Skills Assessment    Diabetes Disease Process/Treatment  Options  Diabetes Disease Process/Treatment Options: Skills Assessment Completed: No  Deferred due to:: Time  Area of need?: No    Nutrition/Healthy Eating  Challenges to healthy eating:: portion control, snacking between meals and at night  Method of carbohydrate measurement:: no method  Patient can identify foods that impact blood sugar.: yes  Patient-identified foods:: starches (bread, pasta, rice, cereal), fruit/fruit juice, sweets, soda  Nutrition/Healthy Eating Skills Assessment Completed:: Yes  Assessment indicates:: Instruction Needed  Area of need?: Yes    Physical Activity/Exercise  Physical Activity/Exercise Skills Assessment Completed: : No  Deffered due to:: Time    Medications  Patient is able to describe current diabetes management routine.: yes  Diabetes management routine:: oral medications  Patient is able to identify current diabetes medications, dosages, and appropriate timing of medications.: no  Patient understands the purpose of the medications taken for diabetes.: yes  Patient reports problems or concerns with current medication regimen.: yes  Medication regimen problems/concerns:: other (see comments) (Concerned with remembering dose 30 minutes ahead of meal.)  Medication Skills Assessment Completed:: Yes  Assessment indicates:: Instruction Needed  Area of need?: Yes    Home Blood Glucose Monitoring  Patient states that blood sugar is checked at home daily.: yes  Monitoring Method:: home glucometer  How often do you check your blood sugar?: Once a day  When do you check your blood sugar?: Other, Before breakfast (Trying to remember to follow Dr. Crespo's BG log schedule to check blood glucose levels.)  When you check what is your typical blood sugar range? : 100-130 mg/dl fasting  Blood glucose logs:: yes, encouraged to keep logs, encouraged to bring logs to provider visits  Blood glucose logs reviewed today?: no  Home Blood Glucose Monitoring Skills Assessment Completed: : Yes  Assessment  indicates:: Instruction Needed  Area of need?: Yes    Acute Complications  Patient is able to identify types of acute complications: Yes  Patient Identified:: Hyperglycemia  Patient is able to state the basic meaning of hypoglycemia?: No  Able to state the blood sugar range for hypoglycemia?: no (see comments)  Patient can identify general symptoms of hypoglycemia: no (see comments)  Able to state proper treatment of hypoglycemia?: no (see comments)  Patient is able to state the basic meaning of hyperglycemia?: No  Able to state the blood sugar range for hyperglycemia?: no (see comments)  Patient able to state proper treatment of hyperglycemia?: no (see comments)  Patient able to verbalize sick day plan?: no  Acute Complications Skills Assessment Completed: : Yes  Assessment indicates:: Instruction Needed  Area of need?: Yes    Chronic Complications  Chronic Complications Skills Assessment Completed: : No  Deferred due to:: Other (comment)    Psychosocial/Coping  Psychosocial/Coping Skills Assessment Completed: : No  Deffered due to:: Time    Assessment Summary and Plan    Based on today's diabetes care assessment, the following areas of need were identified:      Social 1/10/2023   Support No   Access to Mass Media/Tech No   Cognitive/Behavioral Health No   Culture/Catholic No   Communication No   Health Literacy No        Clinical 1/10/2023   Medication Adherence No   Lab Compliance No   Nutritional Status No        Diabetes Self-Management Skills 1/10/2023   Diabetes Disease Process/Treatment Options No   Nutrition/Healthy Eating Yes: see care plan   Medication Yes: see care plan   Home Blood Glucose Monitoring Yes: see care plan   Acute Complications Yes: see care plan      Today's interventions were provided through individual discussion, instruction, and written materials were provided.      Patient verbalized understanding of instruction and written materials.  Pt was able to return back demonstration of  instructions today. Patient understood key points, needs reinforcement and further instruction.     Diabetes Self-Management Care Plan:    Today's Diabetes Self-Management Care Plan was developed with Domitila's input. Domitila has agreed to work toward the following goal(s) to improve his/her overall diabetes control.      Care Plan: Diabetes Management   Updates made since 12/11/2022 12:00 AM        Problem: Healthy Eating         Long-Range Goal: Eat 3 meals daily with 3-4 servings of Carbohydrate per meal or follow plate method.    Start Date: 1/10/2023   Priority: Low   Barriers: No Barriers Identified        Task: Reviewed the sources and role of Carbohydrate, Protein, and Fat and how each nutrient impacts blood sugar. Completed 1/10/2023        Task: Provided visual examples using dry measuring cups, food models, and other familiar objects such as computer mouse, deck or cards, tennis ball etc. to help with visualization of portions. Completed 1/10/2023        Task: Discussed strategies for choosing healthier menu options when dining out. Completed 1/10/2023        Task: Recommended replacing beverages containing high sugar content with noncaloric/sugar free options and/or water. Completed 1/10/2023        Task: Provided Sample plate method and reviewed the use of the plate to estimate amounts of carbohydrate per meal. Completed 1/10/2023        Problem: Acute Complications         Long-Range Goal: Patient agrees to identify and manage signs and symptoms of low blood sugar (hypoglycemia) by keeping a log of events and using proper treatment.    Start Date: 1/10/2023   Priority: High   Barriers: No Barriers Identified        Task: Reviewed proper treatment of hypoglycemia with the rule of 15--patient to eat 15g simple carbohydrate (4 glucose tablets, 1 glucose gel, 5 pieces hard candy, ½ cup fruit juice, ½ can regular soda, etc) and wait 15 minutes and recheck home glucose. Completed 1/10/2023        Task: Reviewed  common causes and precautions to help prevent hyper/hypoglycemic events. Completed 1/10/2023        Task: Reviewed signs and symptoms of hypoglycemia, what range is considered to be hypoglycemia, and when to seek further medical attention. Completed 1/10/2023        Problem: Blood Glucose Self-Monitoring         Goal: Patient agrees to check and record blood sugars 2 times per day following Dr. Crespo's schedule for testing and when experiencing symptoms of hypoglycemia.    Priority: Low   Barriers: No Barriers Identified        Task: Reviewed the importance of self-monitoring blood glucose and keeping logs. Completed 1/10/2023        Task: Instructed on goal blood glucose to be  mg/dl before meals and less than 180 mg/dl 2 hours after meals. Completed 1/10/2023        Problem: Medications         Long-Range Goal: Patient Agrees to take Prandin twice daily 20-30 minutes before meals.  Patient eats two meals daily.    Start Date: 1/10/2023   Priority: High   Barriers: Other (comments)   Note:    Patient reported memory as issue to taking medication properly.   Patient was instructed by his two daughters to take medication 1 hour ahead of meals.  He reports it is easier to remember 30 minutes ahead of time as prescribed by Dr. Crespo, but it is still a challenge. Explained if he took medication 20 minutes ahead of time it would still be efficacious.           Task: Reviewed with patient all current diabetes medications and provided basic review of the purpose, dosage, frequency, side effects, and storage of both oral and injectable diabetes medications. Completed 1/10/2023        Task: Discussed guidelines for preventing, detecting and treating hypoglycemia and hyperglycemia and reviewed the importance of meal and medication timing with diabetes mediations for prevention of hypoglycemia and maximum drug benefit. Completed 1/10/2023          Follow Up Plan     Follow up if symptoms worsen or fail to improve.  Patient  provided with direct phone line for questions or to make follow up appointment.  Patient reports he understands he can make some adjustments to his diet to improve overall health.  Guidelines provided to minimize BG spikes.  Discussed in depth concern for hypoglycemia including S/S and rule of 15/15 to treat it. Discussed how to properly take Prandin 30 minutes before meals per Dr. Crespo.  Asked patient to adhere to the blood glucose log guidelines set forth by Dr. Crespo.  Wife in attendance.      Today's care plan and follow up schedule was discussed with patient.  Domitila verbalized understanding of the care plan, goals, and agrees to follow up plan.        The patient was encouraged to communicate with his/her health care provider/physician and care team regarding his/her condition(s) and treatment.  I provided the patient with my contact information today and encouraged to contact me via phone or [facility] Patient Portal as needed.     Length of Visit   Total Time: 60 Minutes

## 2023-03-09 ENCOUNTER — LAB VISIT (OUTPATIENT)
Dept: LAB | Facility: HOSPITAL | Age: 83
End: 2023-03-09
Attending: FAMILY MEDICINE
Payer: MEDICARE

## 2023-03-09 DIAGNOSIS — E11.8 TYPE 2 DIABETES MELLITUS WITH COMPLICATION, WITHOUT LONG-TERM CURRENT USE OF INSULIN: ICD-10-CM

## 2023-03-09 LAB
ANION GAP SERPL CALC-SCNC: 12 MMOL/L (ref 8–16)
BUN SERPL-MCNC: 23 MG/DL (ref 8–23)
CALCIUM SERPL-MCNC: 9 MG/DL (ref 8.7–10.5)
CHLORIDE SERPL-SCNC: 103 MMOL/L (ref 95–110)
CO2 SERPL-SCNC: 23 MMOL/L (ref 23–29)
CREAT SERPL-MCNC: 1 MG/DL (ref 0.5–1.4)
EST. GFR  (NO RACE VARIABLE): >60 ML/MIN/1.73 M^2
ESTIMATED AVG GLUCOSE: 123 MG/DL (ref 68–131)
GLUCOSE SERPL-MCNC: 103 MG/DL (ref 70–110)
HBA1C MFR BLD: 5.9 % (ref 4–5.6)
POTASSIUM SERPL-SCNC: 4.2 MMOL/L (ref 3.5–5.1)
SODIUM SERPL-SCNC: 138 MMOL/L (ref 136–145)

## 2023-03-09 PROCEDURE — 83036 HEMOGLOBIN GLYCOSYLATED A1C: CPT | Performed by: FAMILY MEDICINE

## 2023-03-09 PROCEDURE — 36415 COLL VENOUS BLD VENIPUNCTURE: CPT | Mod: PO | Performed by: FAMILY MEDICINE

## 2023-03-09 PROCEDURE — 80048 BASIC METABOLIC PNL TOTAL CA: CPT | Performed by: FAMILY MEDICINE

## 2023-03-10 ENCOUNTER — PATIENT MESSAGE (OUTPATIENT)
Dept: FAMILY MEDICINE | Facility: CLINIC | Age: 83
End: 2023-03-10
Payer: MEDICARE

## 2023-03-10 NOTE — TELEPHONE ENCOUNTER
Congratulations.He is heartily congratulated on an excellent job with lifestyle changes and successful management of their medical conditions.

## 2023-03-16 ENCOUNTER — OFFICE VISIT (OUTPATIENT)
Dept: FAMILY MEDICINE | Facility: CLINIC | Age: 83
End: 2023-03-16
Payer: MEDICARE

## 2023-03-16 VITALS
WEIGHT: 205.69 LBS | BODY MASS INDEX: 27.26 KG/M2 | HEIGHT: 73 IN | DIASTOLIC BLOOD PRESSURE: 76 MMHG | SYSTOLIC BLOOD PRESSURE: 132 MMHG | OXYGEN SATURATION: 96 % | TEMPERATURE: 98 F | HEART RATE: 55 BPM

## 2023-03-16 DIAGNOSIS — M21.942 HAND DEFORMITY, ACQUIRED, LEFT: ICD-10-CM

## 2023-03-16 DIAGNOSIS — N18.31 TYPE 2 DIABETES MELLITUS WITH STAGE 3A CHRONIC KIDNEY DISEASE, WITHOUT LONG-TERM CURRENT USE OF INSULIN: ICD-10-CM

## 2023-03-16 DIAGNOSIS — E11.59 CONTROLLED TYPE 2 DIABETES MELLITUS WITH OTHER CIRCULATORY COMPLICATION, WITHOUT LONG-TERM CURRENT USE OF INSULIN: Primary | ICD-10-CM

## 2023-03-16 DIAGNOSIS — E11.42 DIABETIC POLYNEUROPATHY ASSOCIATED WITH TYPE 2 DIABETES MELLITUS: ICD-10-CM

## 2023-03-16 DIAGNOSIS — E11.22 TYPE 2 DIABETES MELLITUS WITH STAGE 3A CHRONIC KIDNEY DISEASE, WITHOUT LONG-TERM CURRENT USE OF INSULIN: ICD-10-CM

## 2023-03-16 DIAGNOSIS — I15.2 HYPERTENSION ASSOCIATED WITH DIABETES: ICD-10-CM

## 2023-03-16 DIAGNOSIS — E78.5 HYPERLIPIDEMIA ASSOCIATED WITH TYPE 2 DIABETES MELLITUS: ICD-10-CM

## 2023-03-16 DIAGNOSIS — E11.59 HYPERTENSION ASSOCIATED WITH DIABETES: ICD-10-CM

## 2023-03-16 DIAGNOSIS — I48.20 CHRONIC ATRIAL FIBRILLATION: ICD-10-CM

## 2023-03-16 DIAGNOSIS — E11.69 HYPERLIPIDEMIA ASSOCIATED WITH TYPE 2 DIABETES MELLITUS: ICD-10-CM

## 2023-03-16 PROCEDURE — 99214 PR OFFICE/OUTPT VISIT, EST, LEVL IV, 30-39 MIN: ICD-10-PCS | Mod: S$PBB,,, | Performed by: NURSE PRACTITIONER

## 2023-03-16 PROCEDURE — 99999 PR PBB SHADOW E&M-EST. PATIENT-LVL III: CPT | Mod: PBBFAC,,, | Performed by: NURSE PRACTITIONER

## 2023-03-16 PROCEDURE — 99213 OFFICE O/P EST LOW 20 MIN: CPT | Mod: PBBFAC,PO | Performed by: NURSE PRACTITIONER

## 2023-03-16 PROCEDURE — 99999 PR PBB SHADOW E&M-EST. PATIENT-LVL III: ICD-10-PCS | Mod: PBBFAC,,, | Performed by: NURSE PRACTITIONER

## 2023-03-16 PROCEDURE — 99214 OFFICE O/P EST MOD 30 MIN: CPT | Mod: S$PBB,,, | Performed by: NURSE PRACTITIONER
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
## 2023-03-16 NOTE — PATIENT INSTRUCTIONS
Figueroa Ramesh,     If you are due for any health screening(s) below please notify me so we can arrange them to be ordered and scheduled to maintain your health. Most healthy patients complete it. Don't lose out on improving your health.     All of your core healthy metrics are met.

## 2023-04-27 ENCOUNTER — TELEPHONE (OUTPATIENT)
Dept: ADMINISTRATIVE | Facility: CLINIC | Age: 83
End: 2023-04-27
Payer: MEDICARE

## 2023-06-13 ENCOUNTER — LAB VISIT (OUTPATIENT)
Dept: LAB | Facility: HOSPITAL | Age: 83
End: 2023-06-13
Attending: NURSE PRACTITIONER
Payer: MEDICARE

## 2023-06-13 DIAGNOSIS — I15.2 HYPERTENSION ASSOCIATED WITH DIABETES: ICD-10-CM

## 2023-06-13 DIAGNOSIS — E11.59 CONTROLLED TYPE 2 DIABETES MELLITUS WITH OTHER CIRCULATORY COMPLICATION, WITHOUT LONG-TERM CURRENT USE OF INSULIN: ICD-10-CM

## 2023-06-13 DIAGNOSIS — E11.59 HYPERTENSION ASSOCIATED WITH DIABETES: ICD-10-CM

## 2023-06-13 DIAGNOSIS — E78.5 HYPERLIPIDEMIA ASSOCIATED WITH TYPE 2 DIABETES MELLITUS: ICD-10-CM

## 2023-06-13 DIAGNOSIS — E11.69 HYPERLIPIDEMIA ASSOCIATED WITH TYPE 2 DIABETES MELLITUS: ICD-10-CM

## 2023-06-13 LAB
ALBUMIN SERPL BCP-MCNC: 3.8 G/DL (ref 3.5–5.2)
ALP SERPL-CCNC: 69 U/L (ref 55–135)
ALT SERPL W/O P-5'-P-CCNC: 19 U/L (ref 10–44)
ANION GAP SERPL CALC-SCNC: 10 MMOL/L (ref 8–16)
AST SERPL-CCNC: 19 U/L (ref 10–40)
BILIRUB SERPL-MCNC: 0.7 MG/DL (ref 0.1–1)
BUN SERPL-MCNC: 16 MG/DL (ref 8–23)
CALCIUM SERPL-MCNC: 9 MG/DL (ref 8.7–10.5)
CHLORIDE SERPL-SCNC: 105 MMOL/L (ref 95–110)
CHOLEST SERPL-MCNC: 161 MG/DL (ref 120–199)
CHOLEST/HDLC SERPL: 3.2 {RATIO} (ref 2–5)
CO2 SERPL-SCNC: 26 MMOL/L (ref 23–29)
CREAT SERPL-MCNC: 1.2 MG/DL (ref 0.5–1.4)
EST. GFR  (NO RACE VARIABLE): 59.3 ML/MIN/1.73 M^2
ESTIMATED AVG GLUCOSE: 120 MG/DL (ref 68–131)
GLUCOSE SERPL-MCNC: 128 MG/DL (ref 70–110)
HBA1C MFR BLD: 5.8 % (ref 4–5.6)
HDLC SERPL-MCNC: 50 MG/DL (ref 40–75)
HDLC SERPL: 31.1 % (ref 20–50)
LDLC SERPL CALC-MCNC: 97.8 MG/DL (ref 63–159)
NONHDLC SERPL-MCNC: 111 MG/DL
POTASSIUM SERPL-SCNC: 4.8 MMOL/L (ref 3.5–5.1)
PROT SERPL-MCNC: 6.6 G/DL (ref 6–8.4)
SODIUM SERPL-SCNC: 141 MMOL/L (ref 136–145)
TRIGL SERPL-MCNC: 66 MG/DL (ref 30–150)

## 2023-06-13 PROCEDURE — 36415 COLL VENOUS BLD VENIPUNCTURE: CPT | Mod: PO | Performed by: NURSE PRACTITIONER

## 2023-06-13 PROCEDURE — 80061 LIPID PANEL: CPT | Performed by: NURSE PRACTITIONER

## 2023-06-13 PROCEDURE — 80053 COMPREHEN METABOLIC PANEL: CPT | Performed by: NURSE PRACTITIONER

## 2023-06-13 PROCEDURE — 83036 HEMOGLOBIN GLYCOSYLATED A1C: CPT | Performed by: NURSE PRACTITIONER

## 2023-06-16 ENCOUNTER — OFFICE VISIT (OUTPATIENT)
Dept: FAMILY MEDICINE | Facility: CLINIC | Age: 83
End: 2023-06-16
Payer: MEDICARE

## 2023-06-16 VITALS
OXYGEN SATURATION: 96 % | WEIGHT: 204.38 LBS | BODY MASS INDEX: 27.09 KG/M2 | SYSTOLIC BLOOD PRESSURE: 132 MMHG | DIASTOLIC BLOOD PRESSURE: 78 MMHG | TEMPERATURE: 98 F | HEIGHT: 73 IN | HEART RATE: 57 BPM

## 2023-06-16 DIAGNOSIS — E11.59 CONTROLLED TYPE 2 DIABETES MELLITUS WITH OTHER CIRCULATORY COMPLICATION, WITHOUT LONG-TERM CURRENT USE OF INSULIN: Primary | ICD-10-CM

## 2023-06-16 DIAGNOSIS — I48.20 CHRONIC ATRIAL FIBRILLATION: ICD-10-CM

## 2023-06-16 DIAGNOSIS — E78.5 HYPERLIPIDEMIA ASSOCIATED WITH TYPE 2 DIABETES MELLITUS: ICD-10-CM

## 2023-06-16 DIAGNOSIS — E11.69 HYPERLIPIDEMIA ASSOCIATED WITH TYPE 2 DIABETES MELLITUS: ICD-10-CM

## 2023-06-16 PROCEDURE — 99999 PR PBB SHADOW E&M-EST. PATIENT-LVL IV: CPT | Mod: PBBFAC,,, | Performed by: FAMILY MEDICINE

## 2023-06-16 PROCEDURE — 99214 OFFICE O/P EST MOD 30 MIN: CPT | Mod: PBBFAC,PO | Performed by: FAMILY MEDICINE

## 2023-06-16 PROCEDURE — 99214 OFFICE O/P EST MOD 30 MIN: CPT | Mod: S$PBB,,, | Performed by: FAMILY MEDICINE

## 2023-06-16 PROCEDURE — 99999 PR PBB SHADOW E&M-EST. PATIENT-LVL IV: ICD-10-PCS | Mod: PBBFAC,,, | Performed by: FAMILY MEDICINE

## 2023-06-16 PROCEDURE — 99214 PR OFFICE/OUTPT VISIT, EST, LEVL IV, 30-39 MIN: ICD-10-PCS | Mod: S$PBB,,, | Performed by: FAMILY MEDICINE

## 2023-06-16 RX ORDER — ACARBOSE 25 MG/1
25 TABLET ORAL 2 TIMES DAILY WITH MEALS
Qty: 180 TABLET | Refills: 3 | Status: SHIPPED | OUTPATIENT
Start: 2023-06-16 | End: 2024-03-09

## 2023-06-16 NOTE — PATIENT INSTRUCTIONS
DASH diet for Hypertension and Healthy Eating  Provided by the [facility]    Healthy Lifestyle   Nutrition and healthy eating  The DASH diet emphasizes portion size, eating a variety of foods and getting the right amount of nutrients. Discover how DASH can improve your health and lower your blood pressure.  By [facility] Staff   DASH stands for Dietary Approaches to Stop Hypertension. The DASH diet is a lifelong approach to healthy eating that's designed to help treat or prevent high blood pressure (hypertension). The DASH diet encourages you to reduce the sodium in your diet and eat a variety of foods rich in nutrients that help lower blood pressure, such as potassium, calcium and magnesium.  By following the DASH diet, you may be able to reduce your blood pressure by a few points in just two weeks. Over time, your systolic blood pressure could drop by eight to 14 points, which can make a significant difference in your health risks.  Because the DASH diet is a healthy way of eating, it offers health benefits besides just lowering blood pressure. The DASH diet is also in line with dietary recommendations to prevent osteoporosis, cancer, heart disease, stroke and diabetes.  The DASH diet emphasizes vegetables, fruits and low-fat dairy foods -- and moderate amounts of whole grains, fish, poultry and nuts.  In addition to the standard DASH diet, there is also a lower sodium version of the diet. You can choose the version of the diet that meets your health needs:  Standard DASH diet. You can consume up to 2,300 milligrams (mg) of sodium a day.   Lower sodium DASH diet. You can consume up to 1,500 mg of sodium a day.  Both versions of the DASH diet aim to reduce the amount of sodium in your diet compared with what you might get in a typical American diet, which can amount to a whopping 3,400 mg of sodium a day or more.  The standard DASH diet meets the recommendation from the Dietary Guidelines for Americans to keep  "daily sodium intake to less than 2,300 mg a day.  The American Heart Association recommends 1,500 mg a day of sodium as an upper limit for all adults. If you aren't sure what sodium level is right for you, talk to your doctor.  Both versions of the DASH diet include lots of whole grains, fruits, vegetables and low-fat dairy products. The DASH diet also includes some fish, poultry and legumes, and encourages a small amount of nuts and seeds a few times a week.   You can eat red meat, sweets and fats in small amounts. The DASH diet is low in saturated fat, cholesterol and total fat.  Here's a look at the recommended servings from each food group for the 2,[id number]-f-day DASH diet.  Grains: 6 to 8 servings a day  Grains include bread, cereal, rice and pasta. Examples of one serving of grains include 1 slice whole-wheat bread, 1 ounce dry cereal, or 1/2 cup cooked cereal, rice or pasta.  Focus on whole grains because they have more fiber and nutrients than do refined grains. For instance, use brown rice instead of white rice, whole-wheat pasta instead of regular pasta and whole-grain bread instead of white bread. Look for products labeled "100 percent whole grain" or "100 percent whole wheat."   Grains are naturally low in fat. Keep them this way by avoiding butter, cream and cheese sauces.  Vegetables: 4 to 5 servings a day  Tomatoes, carrots, broccoli, sweet potatoes, greens and other vegetables are full of fiber, vitamins, and such minerals as potassium and magnesium. Examples of one serving include 1 cup raw leafy green vegetables or 1/2 cup cut-up raw or cooked vegetables.  Don't think of vegetables only as side dishes -- a hearty blend of vegetables served over brown rice or whole-wheat noodles can serve as the main dish for a meal.   Fresh and frozen vegetables are both good choices. When buying frozen and canned vegetables, choose those labeled as low sodium or without added salt.   To increase the number of " servings you fit in daily, be creative. In a stir-[place], for instance, cut the amount of meat in half and double up on the vegetables.  Fruits: 4 to 5 servings a day  Many fruits need little preparation to become a healthy part of a meal or snack. Like vegetables, they're packed with fiber, potassium and magnesium and are typically low in fat -- coconuts are an exception. Examples of one serving include one medium fruit, 1/2 cup fresh, frozen or canned fruit, or 4 ounces of juice.  Have a piece of fruit with meals and one as a snack, then round out your day with a dessert of fresh fruits topped with a dollop of low-fat yogurt.   Leave on edible peels whenever possible. The peels of apples, pears and most fruits with pits add interesting texture to recipes and contain healthy nutrients and fiber.   Remember that citrus fruits and juices, such as grapefruit, can interact with certain medications, so check with your doctor or pharmacist to see if they're OK for you.   If you choose canned fruit or juice, make sure no sugar is added.  Dairy: 2 to 3 servings a day  Milk, yogurt, cheese and other dairy products are major sources of calcium, vitamin D and protein. But the key is to make sure that you choose dairy products that are low fat or fat-free because otherwise they can be a major source of fat -- and most of it is saturated. Examples of one serving include 1 cup skim or 1 percent milk, 1 cup low fat yogurt, or 1 1/2 ounces part-skim cheese.  Low-fat or fat-free frozen yogurt can help you boost the amount of dairy products you eat while offering a sweet treat. Add fruit for a healthy twist.   If you have trouble digesting dairy products, choose lactose-free products or consider taking an over-the-counter product that contains the enzyme lactase, which can reduce or prevent the symptoms of lactose intolerance.   Go easy on regular and even fat-free cheeses because they are typically high in sodium.  Lean meat, poultry  and fish: 6 servings or fewer a day  Meat can be a rich source of protein, B vitamins, iron and zinc. Choose lean varieties and aim for no more than 6 ounces a day. Cutting back on your meat portion will allow room for more vegetables.  Trim away skin and fat from poultry and meat and then bake, broil, grill or roast instead of frying in fat.   Eat heart-healthy fish, such as salmon, herring and tuna. These types of fish are high in omega-3 fatty acids, which can help lower your total cholesterol.  Nuts, seeds and legumes: 4 to 5 servings a week  Almonds, sunflower seeds, kidney beans, peas, lentils and other foods in this family are good sources of magnesium, potassium and protein. They're also full of fiber and phytochemicals, which are plant compounds that may protect against some cancers and cardiovascular disease.  Serving sizes are small and are intended to be consumed only a few times a week because these foods are high in calories. Examples of one serving include 1/3 cup nuts, 2 tablespoons seeds, or 1/2 cup cooked beans or peas.   Nuts sometimes get a bad rap because of their fat content, but they contain healthy types of fat -- monounsaturated fat and omega-3 fatty acids. They're high in calories, however, so eat them in moderation. Try adding them to stir-fries, salads or cereals.   Soybean-based products, such as tofu and tempeh, can be a good alternative to meat because they contain all of the amino acids your body needs to make a complete protein, just like meat.  Fats and oils: 2 to 3 servings a day  Fat helps your body absorb essential vitamins and helps your body's immune system. But too much fat increases your risk of heart disease, diabetes and obesity. The DASH diet strives for a healthy balance by limiting total fat to less than 30 percent of daily calories from fat, with a focus on the healthier monounsaturated fats.  Examples of one serving include 1 teaspoon soft margarine, 1 tablespoon  mayonnaise or 2 tablespoons salad dressing.  Saturated fat and trans fat are the main dietary culprits in increasing your risk of coronary artery disease. DASH helps keep your daily saturated fat to less than 6 percent of your total calories by limiting use of meat, butter, cheese, whole milk, cream and eggs in your diet, along with foods made from lard, solid shortenings, and palm and coconut oils.   Avoid trans fat, commonly found in such processed foods as crackers, baked goods and fried items.   Read food labels on margarine and salad dressing so that you can choose those that are lowest in saturated fat and free of trans fat.  Sweets: 5 servings or fewer a week  You don't have to banish sweets entirely while following the DASH diet -- just go easy on them. Examples of one serving include 1 tablespoon sugar, jelly or jam, 1/2 cup sorbet, or 1 cup lemonade.  When you eat sweets, choose those that are fat-free or low-fat, such as sorbets, fruit ices, jelly beans, hard candy, estrellita crackers or low-fat cookies.   Artificial sweeteners such as aspartame (NutraSweet, Equal) and sucralose (Splenda) may help satisfy your sweet tooth while sparing the sugar. But remember that you still must use them sensibly. It's OK to swap a diet cola for a regular cola, but not in place of a more nutritious beverage such as low-fat milk or even plain water.   Cut back on added sugar, which has no nutritional value but can pack on calories.  Drinking too much alcohol can increase blood pressure. The Dietary Guidelines for Americans recommends that men limit alcohol to no more than two drinks a day and women to one or less.  The DASH diet doesn't address caffeine consumption. The influence of caffeine on blood pressure remains unclear. But caffeine can cause your blood pressure to rise at least temporarily. If you already have high blood pressure or if you think caffeine is affecting your blood pressure, talk to your doctor about your  "caffeine consumption.  While the DASH diet is not a weight-loss program, you may indeed lose unwanted pounds because it can help guide you toward healthier food choices.  The DASH diet generally includes about 2,000 calories a day. If you're trying to lose weight, you may need to eat fewer calories. You may also need to adjust your serving goals based on your individual circumstances -- something your health care team can help you decide.  The foods at the core of the DASH diet are naturally low in sodium. So just by following the DASH diet, you're likely to reduce your sodium intake. You also reduce sodium further by:  Using sodium-free spices or flavorings with your food instead of salt   Not adding salt when cooking rice, pasta or hot cereal   Rinsing canned foods to remove some of the sodium   Buying foods labeled "no salt added," "sodium-free," "low sodium" or "very low sodium"  One teaspoon of table salt has 2,325 mg of sodium. When you read food labels, you may be surprised at just how much sodium some processed foods contain. Even low-fat soups, canned vegetables, ready-to-eat cereals and sliced turkey from the local deli -- foods you may have considered healthy -- often have lots of sodium.  You may notice a difference in taste when you choose low-sodium food and beverages. If things seem too bland, gradually introduce low-sodium foods and cut back on table salt until you reach your sodium goal. That'll give your palate time to adjust.  Using salt-free seasoning blends or herbs and spices may also ease the transition. It can take several weeks for your taste buds to get used to less salty foods.  Try these strategies to get started on the DASH diet:   Change gradually. If you now eat only one or two servings of fruits or vegetables a day, try to add a serving at lunch and one at dinner. Rather than switching to all whole grains, start by making one or two of your grain servings whole grains. Increasing " fruits, vegetables and whole grains gradually can also help prevent bloating or diarrhea that may occur if you aren't used to eating a diet with lots of fiber. You can also try over-the-counter products to help reduce gas from beans and vegetables.   Reward successes and forgive slip-ups. Reward yourself with a nonfood treat for your accomplishments -- rent a movie, purchase a book or get together with a friend. Everyone slips, especially when learning something new. Remember that changing your lifestyle is a long-term process. Find out what triggered your setback and then just  where you left off with the DASH diet.   Add physical activity. To boost your blood pressure lowering efforts even more, consider increasing your physical activity in addition to following the DASH diet. Combining both the DASH diet and physical activity makes it more likely that you'll reduce your blood pressure.   Get support if you need it. If you're having trouble sticking to your diet, talk to your doctor or dietitian about it. You might get some tips that will help you stick to the DASH diet.  Remember, healthy eating isn't an all-or-nothing proposition. What's most important is that, on average, you eat healthier foods with plenty of variety -- both to keep your diet nutritious and to avoid boredom or extremes. And with the DASH diet, you can have both.

## 2023-07-06 NOTE — PROGRESS NOTES
Subjective:       Patient ID: Domitila Walters is a 83 y.o. male.    Chief Complaint: Follow-up    Mr. Walters is a 82 year old patient with DM, HTN, and chronic afib. No polyuria, polydipsia, blurry vision, chest pain, dyspnea or claudication.  No foot burning, numbness or pain. Taking medication compliantly without noted sided effects. Follows diet fairly well. Home glucose monitoring in the range of 110.  Has seen diabetic educator. Last eye exam within the year was reportedly negative. Last foot exam negative within the past year.Lab Results       Lab Results       Component                Value               Date                       HGBA1C                   5.8 (H)             06/13/2023                      T. The patient is followed closely by eye doctor, Dr. Caruso.    Follow-up  This is a chronic problem. Associated symptoms include arthralgias. Pertinent negatives include no abdominal pain, chest pain, coughing, fever, headaches or myalgias.   Review of patient's allergies indicates:  No Known Allergies      Current Outpatient Medications:     beta-carotene,A,-vits C,E/mins (OCUVITE ORAL), Take by mouth., Disp: , Rfl:     blood sugar diagnostic Strp, 1 strip by Misc.(Non-Drug; Combo Route) route 2 (two) times daily with meals., Disp: 100 strip, Rfl: 11    blood-glucose meter Misc, 1 Device by Misc.(Non-Drug; Combo Route) route 2 (two) times daily with meals., Disp: 1 each, Rfl: 0    calcium-vitamin D3 (OS-BROOKLYNN 500 + D3) 500 mg-5 mcg (200 unit) per tablet, Take 1 tablet by mouth 2 (two) times daily with meals., Disp: , Rfl:     cholecalciferol, vitamin D3, 1,000 unit capsule, Take 1 capsule (1,000 Units total) by mouth once daily., Disp: 90 capsule, Rfl: 11    diltiaZEM (CARDIZEM CD) 300 MG 24 hr capsule, TAKE ONE CAPSULE BY MOUTH EVERY DAY, Disp: 90 capsule, Rfl: 2    ELIQUIS 5 mg Tab, TAKE ONE TABLET BY MOUTH TWICE DAILY, Disp: 180 tablet, Rfl: 3    FOLBIC 2.5-25-2 mg Tab, TAKE ONE TABLET BY MOUTH DAILY,  Disp: 90 tablet, Rfl: 3    lancets Misc, 1 each by Misc.(Non-Drug; Combo Route) route 2 (two) times daily with meals., Disp: 100 each, Rfl: 11    lancing device with lancets Kit, 1 Device by Misc.(Non-Drug; Combo Route) route 2 (two) times daily with meals., Disp: 1 each, Rfl: 0    omeprazole (PRILOSEC) 20 MG capsule, TAKE ONE CAPSULE BY MOUTH ONCE DAILY, Disp: 90 capsule, Rfl: 2    pravastatin (PRAVACHOL) 40 MG tablet, TAKE ONE TABLET BY MOUTH ONCE DAILY, Disp: 90 tablet, Rfl: 3    acarbose (PRECOSE) 25 MG Tab, Take 1 tablet (25 mg total) by mouth 2 (two) times daily with meals., Disp: 180 tablet, Rfl: 3    Lab Results   Component Value Date    WBC 7.74 03/23/2022    HGB 15.2 03/23/2022    HCT 46.9 03/23/2022     03/23/2022    CHOL 161 06/13/2023    TRIG 66 06/13/2023    HDL 50 06/13/2023    ALT 19 06/13/2023    AST 19 06/13/2023     06/13/2023    K 4.8 06/13/2023     06/13/2023    CREATININE 1.2 06/13/2023    BUN 16 06/13/2023    CO2 26 06/13/2023    TSH 1.462 05/31/2018    PSA 2.2 01/29/2016    INR 1.0 09/28/2017    HGBA1C 5.8 (H) 06/13/2023       Review of Systems   Constitutional:  Negative for activity change, appetite change and fever.   HENT:  Negative for postnasal drip, rhinorrhea and sinus pressure.    Eyes:  Positive for visual disturbance.   Respiratory:  Negative for cough and shortness of breath.    Cardiovascular:  Negative for chest pain.   Gastrointestinal:  Negative for abdominal distention and abdominal pain.   Genitourinary:  Positive for urgency. Negative for difficulty urinating and dysuria.        Urine odor   Musculoskeletal:  Positive for arthralgias and back pain. Negative for myalgias.   Neurological:  Negative for headaches.   Hematological:  Negative for adenopathy.   Psychiatric/Behavioral:  The patient is not nervous/anxious.      Objective:      Physical Exam  Constitutional:       Appearance: Normal appearance.   HENT:      Head: Normocephalic and atraumatic.    Eyes:      Conjunctiva/sclera: Conjunctivae normal.   Cardiovascular:      Rate and Rhythm: Normal rate. Rhythm irregular.      Pulses:           Dorsalis pedis pulses are 1+ on the right side and 1+ on the left side.        Posterior tibial pulses are 1+ on the right side and 1+ on the left side.   Pulmonary:      Effort: Pulmonary effort is normal. No respiratory distress.      Breath sounds: Normal breath sounds. No wheezing.   Abdominal:      General: There is no distension.      Palpations: There is no mass.      Tenderness: There is no abdominal tenderness.   Musculoskeletal:      Right lower leg: No edema.      Left lower leg: No edema.   Feet:      Right foot:      Protective Sensation: 8 sites tested.  8 sites sensed.      Skin integrity: Dry skin present. No ulcer or blister.      Toenail Condition: Right toenails are abnormally thick. Fungal disease present.     Left foot:      Protective Sensation: 8 sites tested.  8 sites sensed.      Skin integrity: Dry skin present. No ulcer or blister.      Toenail Condition: Left toenails are abnormally thick. Fungal disease present.  Lymphadenopathy:      Cervical: No cervical adenopathy.   Skin:     Findings: No erythema.   Neurological:      Mental Status: He is alert and oriented to person, place, and time.   Psychiatric:         Behavior: Behavior normal.       Assessment:       1. Controlled type 2 diabetes mellitus with other circulatory complication, without long-term current use of insulin    2. Chronic atrial fibrillation    3. Hyperlipidemia associated with type 2 diabetes mellitus        Plan:   Domitila was seen today for annual exam.  Controlled type 2 diabetes mellitus with other circulatory complication, without long-term current use of insulin  -     acarbose (PRECOSE) 25 MG Tab; Take 1 tablet (25 mg total) by mouth 2 (two) times daily with meals.  Dispense: 180 tablet; Refill: 3  -     Hemoglobin A1c; Future; Expected date: 06/16/2023  -      Comprehensive metabolic panel; Future; Expected date: 06/16/2023  -     CBC W/ AUTO DIFFERENTIAL; Future; Expected date: 06/16/2023  -     Urinalysis; Future; Expected date: 06/16/2023    Chronic atrial fibrillation  -     acarbose (PRECOSE) 25 MG Tab; Take 1 tablet (25 mg total) by mouth 2 (two) times daily with meals.  Dispense: 180 tablet; Refill: 3    Hyperlipidemia associated with type 2 diabetes mellitus  -     acarbose (PRECOSE) 25 MG Tab; Take 1 tablet (25 mg total) by mouth 2 (two) times daily with meals.  Dispense: 180 tablet; Refill: 3      Review plate method  Review foods in home  Discuss food labels  Refer to exercise program  Assist with medical visit preparation  Review patient results  Review patient education about results  Review chronic disease interventions (see specific disease intervention)  Assess knowledge level  Provide and discuss information/education material  Encourage communication with physician  Monitor compliance  Educate on risk of non-compliance  Give food and resource list  Review patient education material (see patient instructions)  Review patient education material (see patient instructions)  Refer to exercise program  Review stress management techniques  Review patient education material (see patient instructions)  Assess support system  Discuss stress management techniques  Review normal ranges for labs    Diagnoses and all orders for this visit:    Diabetic polyneuropathy associated with type 2 diabetes mellitus  -     Hemoglobin A1C; Future  -     Microalbumin/Creatinine Ratio, Urine; Future  -     Comprehensive Metabolic Panel; Future  -     CBC Auto Differential; Future  -     Lipid Panel; Future    Hypertension associated with diabetes  -     Hemoglobin A1C; Future  -     Microalbumin/Creatinine Ratio, Urine; Future  -     Comprehensive Metabolic Panel; Future  -     CBC Auto Differential; Future  -     Lipid Panel; Future  Controlled  Low salt diet  Continue current  medication  Chronic atrial fibrillation, onset in his 30s, CHADS-VAS score 3  Stable, [place] [place]  Continue current medication  Hyperlipidemia associated with type 2 diabetes mellitus  -     Comprehensive Metabolic Panel; Future  -     Lipid Panel; Future  High fiber diet  Bad odor of urine  -     Urinalysis; Future  -     Urine culture; Future    Orders Placed This Encounter   Procedures    Hemoglobin A1c     Standing Status:   Future     Standing Expiration Date:   8/14/2024    Comprehensive metabolic panel     Standing Status:   Future     Standing Expiration Date:   8/14/2024    CBC W/ AUTO DIFFERENTIAL     Standing Status:   Future     Standing Expiration Date:   8/14/2024    Urinalysis           Standing Status:   Future     Standing Expiration Date:   8/14/2024     Order Specific Question:   Collection Type     Answer:   Urine, Clean Catch       Patient will be notified when labs return and further recommendations will be given at that time.     Discussed health maintenance guidelines appropriate for age.  Discussed health maintenance guidelines appropriate for age.  Discussed health maintenance guidelines appropriate for age.

## 2023-07-21 DIAGNOSIS — I48.20 CHRONIC ATRIAL FIBRILLATION: ICD-10-CM

## 2023-07-21 DIAGNOSIS — R74.01 ELEVATED TRANSAMINASE LEVEL: ICD-10-CM

## 2023-07-21 RX ORDER — PRAVASTATIN SODIUM 40 MG/1
TABLET ORAL
Qty: 90 TABLET | Refills: 3 | Status: SHIPPED | OUTPATIENT
Start: 2023-07-21

## 2023-07-21 NOTE — TELEPHONE ENCOUNTER
Refill Routing Note   Medication(s) are not appropriate for processing by [facility] for the following reason(s):      Medication outside of protocol    [facility] action(s):  Approve  Route Care Due:  None identified            Appointments  past 12m or future 3m with PCP    Date Provider   Last Visit   6/16/2023 Isiah Crespo MD   Next Visit   9/26/2023 Isiah Crespo MD   ED visits in past 90 days: 0        Note composed:11:16 AM 07/21/2023

## 2023-07-21 NOTE — TELEPHONE ENCOUNTER
No care due was identified.  [facility] Embedded Care Due Messages. Reference number: [id number].   7/21/2023 10:57:41 AM CDT

## 2023-07-23 RX ORDER — APIXABAN 5 MG/1
TABLET, FILM COATED ORAL
Qty: 180 TABLET | Refills: 3 | Status: SHIPPED | OUTPATIENT
Start: 2023-07-23

## 2023-07-25 ENCOUNTER — TELEPHONE (OUTPATIENT)
Dept: FAMILY MEDICINE | Facility: CLINIC | Age: 83
End: 2023-07-25
Payer: MEDICARE

## 2023-07-25 NOTE — TELEPHONE ENCOUNTER
----- Message from Andrea Chao sent at 7/24/2023  4:48 PM CDT -----  Regarding: advice  Contact: FANNY MANUEL [[id number]]  Type: Needs Medical Advice  Who Called:  [facility]    Symptoms (please be specific):  na    How long has patient had these symptoms:  na    Pharmacy name and phone #:    [facility] [place] [address] [address]  Phone: [phone] Fax: [phone]      Best Call Back Number: see above    Additional Information: Acarbose and Prandin both have active Rx, take both or just 1. Please call to advise.

## 2023-07-31 ENCOUNTER — PES CALL (OUTPATIENT)
Dept: ADMINISTRATIVE | Facility: CLINIC | Age: 83
End: 2023-07-31
Payer: MEDICARE

## 2023-09-26 ENCOUNTER — OFFICE VISIT (OUTPATIENT)
Dept: FAMILY MEDICINE | Facility: CLINIC | Age: 83
End: 2023-09-26
Payer: MEDICARE

## 2023-09-26 VITALS
SYSTOLIC BLOOD PRESSURE: 110 MMHG | HEART RATE: 56 BPM | OXYGEN SATURATION: 98 % | WEIGHT: 201.5 LBS | TEMPERATURE: 98 F | HEIGHT: 73 IN | BODY MASS INDEX: 26.71 KG/M2 | DIASTOLIC BLOOD PRESSURE: 64 MMHG

## 2023-09-26 DIAGNOSIS — M21.942 HAND DEFORMITY, ACQUIRED, LEFT: ICD-10-CM

## 2023-09-26 DIAGNOSIS — E11.59 HYPERTENSION ASSOCIATED WITH DIABETES: ICD-10-CM

## 2023-09-26 DIAGNOSIS — E11.59 CONTROLLED TYPE 2 DIABETES MELLITUS WITH OTHER CIRCULATORY COMPLICATION, WITHOUT LONG-TERM CURRENT USE OF INSULIN: Primary | ICD-10-CM

## 2023-09-26 DIAGNOSIS — F43.29 ADJUSTMENT DISORDER WITH OTHER SYMPTOM: ICD-10-CM

## 2023-09-26 DIAGNOSIS — I48.20 CHRONIC ATRIAL FIBRILLATION: ICD-10-CM

## 2023-09-26 DIAGNOSIS — I15.2 HYPERTENSION ASSOCIATED WITH DIABETES: ICD-10-CM

## 2023-09-26 PROCEDURE — 99999 PR PBB SHADOW E&M-EST. PATIENT-LVL V: CPT | Mod: PBBFAC,,, | Performed by: FAMILY MEDICINE

## 2023-09-26 PROCEDURE — 99999 PR PBB SHADOW E&M-EST. PATIENT-LVL V: ICD-10-PCS | Mod: PBBFAC,,, | Performed by: FAMILY MEDICINE

## 2023-09-26 PROCEDURE — 99999PBSHW FLU VACCINE - QUADRIVALENT - ADJUVANTED: ICD-10-PCS | Mod: PBBFAC,,,

## 2023-09-26 PROCEDURE — 99215 PR OFFICE/OUTPT VISIT, EST, LEVL V, 40-54 MIN: ICD-10-PCS | Mod: S$PBB,,, | Performed by: FAMILY MEDICINE

## 2023-09-26 PROCEDURE — G0008 ADMIN INFLUENZA VIRUS VAC: HCPCS | Mod: PBBFAC,PO

## 2023-09-26 PROCEDURE — 99215 OFFICE O/P EST HI 40 MIN: CPT | Mod: S$PBB,,, | Performed by: FAMILY MEDICINE

## 2023-09-26 PROCEDURE — 99999PBSHW FLU VACCINE - QUADRIVALENT - ADJUVANTED: Mod: PBBFAC,,,

## 2023-09-26 PROCEDURE — 99215 OFFICE O/P EST HI 40 MIN: CPT | Mod: PBBFAC,PO | Performed by: FAMILY MEDICINE

## 2023-09-27 ENCOUNTER — TELEPHONE (OUTPATIENT)
Dept: PSYCHIATRY | Facility: CLINIC | Age: 83
End: 2023-09-27
Payer: MEDICARE

## 2023-09-27 NOTE — TELEPHONE ENCOUNTER
Attempted to call the patient regarding the referral to the Caregivers skills and support group. Phone answered then immediately disconnected. My chart message sent.

## 2023-10-03 ENCOUNTER — OFFICE VISIT (OUTPATIENT)
Dept: FAMILY MEDICINE | Facility: CLINIC | Age: 83
End: 2023-10-03
Payer: MEDICARE

## 2023-10-03 VITALS
WEIGHT: 202.81 LBS | BODY MASS INDEX: 26.88 KG/M2 | HEART RATE: 52 BPM | SYSTOLIC BLOOD PRESSURE: 120 MMHG | DIASTOLIC BLOOD PRESSURE: 74 MMHG | TEMPERATURE: 98 F | OXYGEN SATURATION: 96 % | HEIGHT: 73 IN

## 2023-10-03 DIAGNOSIS — Z63.6 CAREGIVER STRESS: ICD-10-CM

## 2023-10-03 DIAGNOSIS — E11.22 TYPE 2 DIABETES MELLITUS WITH STAGE 3A CHRONIC KIDNEY DISEASE, WITHOUT LONG-TERM CURRENT USE OF INSULIN: ICD-10-CM

## 2023-10-03 DIAGNOSIS — I48.20 CHRONIC ATRIAL FIBRILLATION: ICD-10-CM

## 2023-10-03 DIAGNOSIS — Z00.00 ENCOUNTER FOR PREVENTIVE HEALTH EXAMINATION: Primary | ICD-10-CM

## 2023-10-03 DIAGNOSIS — N18.31 TYPE 2 DIABETES MELLITUS WITH STAGE 3A CHRONIC KIDNEY DISEASE, WITHOUT LONG-TERM CURRENT USE OF INSULIN: ICD-10-CM

## 2023-10-03 DIAGNOSIS — E11.69 HYPERLIPIDEMIA ASSOCIATED WITH TYPE 2 DIABETES MELLITUS: ICD-10-CM

## 2023-10-03 DIAGNOSIS — I15.2 HYPERTENSION ASSOCIATED WITH DIABETES: ICD-10-CM

## 2023-10-03 DIAGNOSIS — E78.5 HYPERLIPIDEMIA ASSOCIATED WITH TYPE 2 DIABETES MELLITUS: ICD-10-CM

## 2023-10-03 DIAGNOSIS — E11.59 HYPERTENSION ASSOCIATED WITH DIABETES: ICD-10-CM

## 2023-10-03 PROCEDURE — 99999 PR PBB SHADOW E&M-EST. PATIENT-LVL IV: CPT | Mod: PBBFAC,,, | Performed by: NURSE PRACTITIONER

## 2023-10-03 PROCEDURE — 99999 PR PBB SHADOW E&M-EST. PATIENT-LVL IV: ICD-10-PCS | Mod: PBBFAC,,, | Performed by: NURSE PRACTITIONER

## 2023-10-03 PROCEDURE — G0439 PPPS, SUBSEQ VISIT: HCPCS | Mod: ,,, | Performed by: NURSE PRACTITIONER

## 2023-10-03 PROCEDURE — G0439 PR MEDICARE ANNUAL WELLNESS SUBSEQUENT VISIT: ICD-10-PCS | Mod: ,,, | Performed by: NURSE PRACTITIONER

## 2023-10-03 PROCEDURE — 99214 OFFICE O/P EST MOD 30 MIN: CPT | Mod: PBBFAC,PO | Performed by: NURSE PRACTITIONER

## 2023-10-03 SDOH — SOCIAL DETERMINANTS OF HEALTH (SDOH): DEPENDENT RELATIVE NEEDING CARE AT HOME: Z63.6

## 2023-10-03 NOTE — PATIENT INSTRUCTIONS
Counseling and Referral of Other Preventative  (Italic type indicates deductible and co-insurance are waived)    Patient Name: Domitila Walters  Today's Date: 10/3/2023    Health Maintenance       Date Due Completion Date    Shingles Vaccine (1 of 2) Never done ---    COVID-19 Vaccine (3 - Pfizer series) 04/29/2021 3/4/2021    Hemoglobin A1c 12/13/2023 6/13/2023    Diabetes Urine Screening 03/09/2024 3/9/2023    Lipid Panel 06/13/2024 6/13/2023    Eye Exam 07/28/2024 7/28/2023    TETANUS VACCINE 07/13/2027 7/13/2017        Orders Placed This Encounter   Procedures    Ambulatory referral/consult to Psychiatry       The following information is provided to all patients.  This information is to help you find resources for any of the problems found today that may be affecting your health:                Living healthy guide: www.[facility].[place].[facility]      Understanding Diabetes: www.diabetes.org      Eating healthy: www.cdc.gov/healthyweight      [facility] home safety checklist: www.cdc.gov/steadi/patient.html      Agency on Aging: www.goea.[place].[facility]      Alcoholics anonymous (AA): www.aa.org      Physical Activity: www.marj.nih.gov/pq5sqeo      Tobacco use: www.quitwithusla.org

## 2023-10-03 NOTE — PROGRESS NOTES
"  Domitila Walters presented for a  Medicare AWV and comprehensive Health Risk Assessment today. The following components were reviewed and updated:    Medical history  Family History  Social history  Allergies and Current Medications  Health Risk Assessment  Health Maintenance  Care Team         ** See Completed Assessments for Annual Wellness Visit within the encounter summary.**         The following assessments were completed:  Living Situation  CAGE  Depression Screening  Timed Get Up and Go  Whisper Test  Cognitive Function Screening  Nutrition Screening  ADL Screening  PAQ Screening    Clock in media     Vitals:    10/03/23 0850   BP: 120/74   Pulse: (!) 52   Temp: 97.7 °F (36.5 °C)   TempSrc: Oral   SpO2: 96%   Weight: 92 kg (202 lb 13.2 oz)   Height: 6' 1" (1.854 m)     Body mass index is 26.76 kg/m².  Physical Exam  Constitutional:       Appearance: He is well-developed.   HENT:      Head: Normocephalic and atraumatic.      Right Ear: Hearing normal.      Left Ear: Hearing normal.      Nose: Nose normal.   Eyes:      General: Lids are normal.      Conjunctiva/sclera: Conjunctivae normal.      Pupils: Pupils are equal, round, and reactive to light.   Cardiovascular:      Rate and Rhythm: Normal rate.   Pulmonary:      Effort: Pulmonary effort is normal.   Abdominal:      Palpations: Abdomen is soft.   Musculoskeletal:         General: Normal range of motion.      Cervical back: Normal range of motion and neck supple.   Skin:     General: Skin is warm and dry.   Neurological:      Mental Status: He is alert and oriented to person, place, and time.               Diagnoses and health risks identified today and associated recommendations/orders:    1. Encounter for preventive health examination  Discussed health maintenance guidelines appropriate for age.    Review for Opioid Screening: Patient does not have rx for Opioids.    Review for Substance Use Disorders: Patient does not use substance.      2. Hypertension " associated with diabetes  Controlled, continue current medication regimen  Low salt diet  Increase physical activity  Followed by pcp      3. Hyperlipidemia associated with type 2 diabetes mellitus  Controlled, continue current medication regimen  Patient taking statin  Followed by pcp      4. Chronic atrial fibrillation, onset in his 30s, CHADS-VAS score 3  Stable, continue current medications     5. Type 2 diabetes mellitus with stage 3a chronic kidney disease, without long-term current use of insulin  Controlled, continue current medication regimen  Last HgA1c - 5.8  ADA diet  Increase physical activity   Followed by pcp      6. Caregiver stress  - Ambulatory referral/consult to Psychiatry; Future    Patient is concerned for his wife and himself given her stroke and unsure of how things will progress as far as her care.  Discussed with him following up with his wife and her pcp and considering case management referral for further evaluation and assistance.      Provided Domitila with a 5-10 year written screening schedule and personal prevention plan. Recommendations were developed using the USPSTF age appropriate recommendations. Education, counseling, and referrals were provided as needed. After Visit Summary printed and given to patient which includes a list of additional screenings\tests needed.    Follow up for One year for Annual Wellness Visit.    Tamika Doan, CABRERA  *3      I offered to discuss advanced care planning, including how to pick a person who would make decisions for you if you were unable to make them for yourself, called a health care power of , and what kind of decisions you might make such as use of life sustaining treatments such as ventilators and tube feeding when faced with a life limiting illness recorded on a living will that they will need to know. (How you want to be cared for as you near the end of your natural life)     X  Patient has advanced directives written and agrees to  provide copies to the institution.
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
## 2023-10-27 ENCOUNTER — TELEPHONE (OUTPATIENT)
Dept: PSYCHIATRY | Facility: CLINIC | Age: 83
End: 2023-10-27
Payer: MEDICARE

## 2024-01-10 NOTE — PATIENT INSTRUCTIONS
Diabetic Foot Care  Diabetes can lead to a number of foot complications. Fortunately, you can prevent most of these with a little extra foot care. If diabetes is not well controlled, it can cause damage to blood vessels and result in poor circulation to the foot. When the skin does not get enough blood flow, it becomes prone to pressure sores and ulcers, which heal slowly.  Diabetes can also damage nerves, interfering with the ability to feel pain and pressure. When you cant feel your foot normally, it is easy to injure your skin, bones, and joints without knowing it. For these reasons diabetes increases the risk of fungal infections, bunions, and ulcers. An ulcer is a sore or break in the skin. With ulcers, often the skin seems to have worn away. Deep ulcers can lead to bone infection.  Gangrene is the most serious foot complication of diabetes. It usually occurs on the tips of the toes as blackened areas of skin. The black area is dead tissue. In severe cases, gangrene spreads to involve the entire toe, other toes, and the entire foot. Foot or toe amputation may be required. Good foot care and blood sugar control can prevent this.  Home care  · Wear comfortable, well-fitting shoes.  · Wash your feet daily with warm water and mild soap.  · After drying, apply a moisturizing cream or lotion to the top and bottom of your feet. Don't put lotion between toes.  · Check your feet daily for skin breaks, blisters, swelling, or redness. Look between your toes as well. If you cannot see the bottoms of your feet, ask someone to look or use a mirror.  · Wear cotton socks and change them every day.  · Trim toenails carefully, and do not cut your cuticles.  · Strive to keep your blood sugar under control with a combination of medicines, diet, and activity.  · If you smoke and have diabetes, it is very important that you stop. Smoking reduces blood flow to your foot.  · Schedule foot exams at least every year, or more often if  Lab called the urine microscopic was rejected this was collected in wrong container YAJAIRAI       Call Becki if any questions    you have foot problems.  · Put your feet up when sitting, wiggle toes, and move ankles to help improve blood flow.  Avoid activities that increase your risk of foot injury:  · Do not walk barefoot.  · Do not use heating pads or hot water bottles on your feet.  · Do not put your foot in a hot tub without first checking the temperature with your hand.  Follow-up care  Follow up with your healthcare provider, or as advised. Be sure to take off your shoes and socks before your appointment starts so your healthcare provider will be sure to check your feet. Report any cut, puncture, scrape, blister, or other injury to your foot. Also report if you have a bunion, hammertoes, ingrown toenail, or ulcer on your foot.  When to seek medical advice  Call your healthcare provider right away if any of these occur:  · Black skin color anywhere on the foot  · Open ulcer with pus draining from the wound  · Increasing foot or leg pain  · New areas of redness or swelling or tender areas of the foot  · Fever of 100.4°F (38°C) or greater  Date Last Reviewed: 5/25/2016  © 3916-0033 [employer]. [address] [place] [address]. All rights reserved. This information is not intended as a substitute for professional medical care. Always follow your healthcare professional's instructions.          Treating Peripheral Neuropathy  Peripheral neuropathy is a disease of the nerves. It most often starts in your feet and may also eventually affect the arms. It may cause pain or may make you unable to sense pain. Sometimes, weakness occurs as well. Lack of pain and weakness makes you more likely to injure yourself without knowing it.  Learn ways to protect your feet. Check your feet daily for wounds you may not have felt. Avoid burns by testing bath water with your elbow before stepping in. Also, always wear shoes to prevent injury.    Regular foot care  If you have foot numbness, you may not notice cutting yourself while  trimming your nails. To prevent problems, your doctor may ask you to visit for nail and callus trimming. See your doctor for foot care as often as suggested.  Check your feet daily  Catch problems early by checking your feet every day for changes. Look at the top and bottom of your feet, your heels, and between your toes. It may help to use a mirror. If this is hard, ask someone to check for you. Call your doctor if you notice a wound, ulceration, ingrown nail, or any changes in your feet. This includes increased heat, swelling, and redness.  Wear proper footwear  Always wear shoes and socks, even indoors. Ask your doctor how to choose the right shoe. After buying shoes, bring them to your doctor to be checked for fit. Take new shoes off every hour or so to check for red pressure areas on your feet. Each time you put on your shoes, use your fingers first to feel inside for foreign objects.  Common causes of peripheral neuropathy  Some common causes of peripheral neuropathy include:  · Diabetes or other endocrine disorders  · Toxins (such as alcohol)  · Nutritional deficiencies (such as Vitamin B-12)  · Kidney disease  · Injury  · Repetitive stress (such as carpal tunnel syndrome)  · Autoimmune disease  · Cancer and tumors  · Infection  Diagnosis and treatment  Diagnosis of peripheral neuropathy includes a complete history and physical exam.  Lab tests including blood work and imaging often help determine the cause.  Special nerve tests are often helpful including nerve conduction velocity studies (NCV), and electromyelography (EMG).  Treatment focuses on treating the underlying disorder and treating symptoms through the use of medicines, injections, TENS (transcutaneous electrical nerve stimulation), acupuncture, massage, and other methods.  Date Last Reviewed: 10/19/2015  © 6571-6476 [employer]. [address] [place] [address], [place] [address]. All rights reserved. This information is not intended as a  substitute for professional medical care. Always follow your healthcare professional's instructions.

## 2024-02-08 ENCOUNTER — LAB VISIT (OUTPATIENT)
Dept: LAB | Facility: HOSPITAL | Age: 84
End: 2024-02-08
Attending: FAMILY MEDICINE
Payer: MEDICARE

## 2024-02-08 DIAGNOSIS — E11.59 CONTROLLED TYPE 2 DIABETES MELLITUS WITH OTHER CIRCULATORY COMPLICATION, WITHOUT LONG-TERM CURRENT USE OF INSULIN: ICD-10-CM

## 2024-02-08 LAB
ALBUMIN SERPL BCP-MCNC: 3.9 G/DL (ref 3.5–5.2)
ALP SERPL-CCNC: 65 U/L (ref 55–135)
ALT SERPL W/O P-5'-P-CCNC: 16 U/L (ref 10–44)
ANION GAP SERPL CALC-SCNC: 8 MMOL/L (ref 8–16)
AST SERPL-CCNC: 16 U/L (ref 10–40)
BILIRUB SERPL-MCNC: 0.6 MG/DL (ref 0.1–1)
BUN SERPL-MCNC: 18 MG/DL (ref 8–23)
CALCIUM SERPL-MCNC: 9.3 MG/DL (ref 8.7–10.5)
CHLORIDE SERPL-SCNC: 108 MMOL/L (ref 95–110)
CO2 SERPL-SCNC: 25 MMOL/L (ref 23–29)
CREAT SERPL-MCNC: 1.2 MG/DL (ref 0.5–1.4)
EST. GFR  (NO RACE VARIABLE): 59.3 ML/MIN/1.73 M^2
ESTIMATED AVG GLUCOSE: 123 MG/DL (ref 68–131)
GLUCOSE SERPL-MCNC: 117 MG/DL (ref 70–110)
HBA1C MFR BLD: 5.9 % (ref 4–5.6)
POTASSIUM SERPL-SCNC: 4.7 MMOL/L (ref 3.5–5.1)
PROT SERPL-MCNC: 6.8 G/DL (ref 6–8.4)
SODIUM SERPL-SCNC: 141 MMOL/L (ref 136–145)

## 2024-02-08 PROCEDURE — 83036 HEMOGLOBIN GLYCOSYLATED A1C: CPT | Performed by: FAMILY MEDICINE

## 2024-02-08 PROCEDURE — 36415 COLL VENOUS BLD VENIPUNCTURE: CPT | Mod: PO | Performed by: FAMILY MEDICINE

## 2024-02-08 PROCEDURE — 80053 COMPREHEN METABOLIC PANEL: CPT | Performed by: FAMILY MEDICINE

## 2024-02-12 ENCOUNTER — TELEPHONE (OUTPATIENT)
Dept: PRIMARY CARE CLINIC | Facility: CLINIC | Age: 84
End: 2024-02-12
Payer: MEDICARE

## 2024-02-12 NOTE — TELEPHONE ENCOUNTER
----- Message from Isiah Crespo MD sent at 2/9/2024  4:45 PM CST -----  Please contact the patient and let them know that their results were fine and do not require any change in treatment.

## 2024-02-15 ENCOUNTER — OFFICE VISIT (OUTPATIENT)
Dept: PRIMARY CARE CLINIC | Facility: CLINIC | Age: 84
End: 2024-02-15
Payer: MEDICARE

## 2024-02-15 VITALS
BODY MASS INDEX: 27.09 KG/M2 | WEIGHT: 204.38 LBS | HEART RATE: 67 BPM | SYSTOLIC BLOOD PRESSURE: 138 MMHG | DIASTOLIC BLOOD PRESSURE: 90 MMHG | OXYGEN SATURATION: 97 % | HEIGHT: 73 IN | TEMPERATURE: 98 F

## 2024-02-15 DIAGNOSIS — N18.31 TYPE 2 DIABETES MELLITUS WITH STAGE 3A CHRONIC KIDNEY DISEASE, WITHOUT LONG-TERM CURRENT USE OF INSULIN: ICD-10-CM

## 2024-02-15 DIAGNOSIS — E11.22 TYPE 2 DIABETES MELLITUS WITH STAGE 3A CHRONIC KIDNEY DISEASE, WITHOUT LONG-TERM CURRENT USE OF INSULIN: ICD-10-CM

## 2024-02-15 DIAGNOSIS — I48.20 CHRONIC ATRIAL FIBRILLATION: ICD-10-CM

## 2024-02-15 DIAGNOSIS — E11.59 HYPERTENSION ASSOCIATED WITH DIABETES: ICD-10-CM

## 2024-02-15 DIAGNOSIS — H35.3211 EXUDATIVE AGE-RELATED MACULAR DEGENERATION, RIGHT EYE, WITH ACTIVE CHOROIDAL NEOVASCULARIZATION: ICD-10-CM

## 2024-02-15 DIAGNOSIS — I15.2 HYPERTENSION ASSOCIATED WITH DIABETES: ICD-10-CM

## 2024-02-15 PROCEDURE — 99214 OFFICE O/P EST MOD 30 MIN: CPT | Mod: PBBFAC,PO | Performed by: FAMILY MEDICINE

## 2024-02-15 PROCEDURE — 99999 PR PBB SHADOW E&M-EST. PATIENT-LVL IV: CPT | Mod: PBBFAC,,, | Performed by: FAMILY MEDICINE

## 2024-02-15 PROCEDURE — 99214 OFFICE O/P EST MOD 30 MIN: CPT | Mod: S$PBB,,, | Performed by: FAMILY MEDICINE

## 2024-02-15 NOTE — PATIENT INSTRUCTIONS
DASH diet for Hypertension and Healthy Eating  Provided by the [facility]    Healthy Lifestyle   Nutrition and healthy eating  The DASH diet emphasizes portion size, eating a variety of foods and getting the right amount of nutrients. Discover how DASH can improve your health and lower your blood pressure.  By [facility] Staff   DASH stands for Dietary Approaches to Stop Hypertension. The DASH diet is a lifelong approach to healthy eating that's designed to help treat or prevent high blood pressure (hypertension). The DASH diet encourages you to reduce the sodium in your diet and eat a variety of foods rich in nutrients that help lower blood pressure, such as potassium, calcium and magnesium.  By following the DASH diet, you may be able to reduce your blood pressure by a few points in just two weeks. Over time, your systolic blood pressure could drop by eight to 14 points, which can make a significant difference in your health risks.  Because the DASH diet is a healthy way of eating, it offers health benefits besides just lowering blood pressure. The DASH diet is also in line with dietary recommendations to prevent osteoporosis, cancer, heart disease, stroke and diabetes.  The DASH diet emphasizes vegetables, fruits and low-fat dairy foods -- and moderate amounts of whole grains, fish, poultry and nuts.  In addition to the standard DASH diet, there is also a lower sodium version of the diet. You can choose the version of the diet that meets your health needs:  Standard DASH diet. You can consume up to 2,300 milligrams (mg) of sodium a day.   Lower sodium DASH diet. You can consume up to 1,500 mg of sodium a day.  Both versions of the DASH diet aim to reduce the amount of sodium in your diet compared with what you might get in a typical American diet, which can amount to a whopping 3,400 mg of sodium a day or more.  The standard DASH diet meets the recommendation from the Dietary Guidelines for Americans to keep  "daily sodium intake to less than 2,300 mg a day.  The American Heart Association recommends 1,500 mg a day of sodium as an upper limit for all adults. If you aren't sure what sodium level is right for you, talk to your doctor.  Both versions of the DASH diet include lots of whole grains, fruits, vegetables and low-fat dairy products. The DASH diet also includes some fish, poultry and legumes, and encourages a small amount of nuts and seeds a few times a week.   You can eat red meat, sweets and fats in small amounts. The DASH diet is low in saturated fat, cholesterol and total fat.  Here's a look at the recommended servings from each food group for the 2,[id number]-a-day DASH diet.  Grains: 6 to 8 servings a day  Grains include bread, cereal, rice and pasta. Examples of one serving of grains include 1 slice whole-wheat bread, 1 ounce dry cereal, or 1/2 cup cooked cereal, rice or pasta.  Focus on whole grains because they have more fiber and nutrients than do refined grains. For instance, use brown rice instead of white rice, whole-wheat pasta instead of regular pasta and whole-grain bread instead of white bread. Look for products labeled "100 percent whole grain" or "100 percent whole wheat."   Grains are naturally low in fat. Keep them this way by avoiding butter, cream and cheese sauces.  Vegetables: 4 to 5 servings a day  Tomatoes, carrots, broccoli, sweet potatoes, greens and other vegetables are full of fiber, vitamins, and such minerals as potassium and magnesium. Examples of one serving include 1 cup raw leafy green vegetables or 1/2 cup cut-up raw or cooked vegetables.  Don't think of vegetables only as side dishes -- a hearty blend of vegetables served over brown rice or whole-wheat noodles can serve as the main dish for a meal.   Fresh and frozen vegetables are both good choices. When buying frozen and canned vegetables, choose those labeled as low sodium or without added salt.   To increase the number of " servings you fit in daily, be creative. In a stir-[place], for instance, cut the amount of meat in half and double up on the vegetables.  Fruits: 4 to 5 servings a day  Many fruits need little preparation to become a healthy part of a meal or snack. Like vegetables, they're packed with fiber, potassium and magnesium and are typically low in fat -- coconuts are an exception. Examples of one serving include one medium fruit, 1/2 cup fresh, frozen or canned fruit, or 4 ounces of juice.  Have a piece of fruit with meals and one as a snack, then round out your day with a dessert of fresh fruits topped with a dollop of low-fat yogurt.   Leave on edible peels whenever possible. The peels of apples, pears and most fruits with pits add interesting texture to recipes and contain healthy nutrients and fiber.   Remember that citrus fruits and juices, such as grapefruit, can interact with certain medications, so check with your doctor or pharmacist to see if they're OK for you.   If you choose canned fruit or juice, make sure no sugar is added.  Dairy: 2 to 3 servings a day  Milk, yogurt, cheese and other dairy products are major sources of calcium, vitamin D and protein. But the key is to make sure that you choose dairy products that are low fat or fat-free because otherwise they can be a major source of fat -- and most of it is saturated. Examples of one serving include 1 cup skim or 1 percent milk, 1 cup low fat yogurt, or 1 1/2 ounces part-skim cheese.  Low-fat or fat-free frozen yogurt can help you boost the amount of dairy products you eat while offering a sweet treat. Add fruit for a healthy twist.   If you have trouble digesting dairy products, choose lactose-free products or consider taking an over-the-counter product that contains the enzyme lactase, which can reduce or prevent the symptoms of lactose intolerance.   Go easy on regular and even fat-free cheeses because they are typically high in sodium.  Lean meat, poultry  and fish: 6 servings or fewer a day  Meat can be a rich source of protein, B vitamins, iron and zinc. Choose lean varieties and aim for no more than 6 ounces a day. Cutting back on your meat portion will allow room for more vegetables.  Trim away skin and fat from poultry and meat and then bake, broil, grill or roast instead of frying in fat.   Eat heart-healthy fish, such as salmon, herring and tuna. These types of fish are high in omega-3 fatty acids, which can help lower your total cholesterol.  Nuts, seeds and legumes: 4 to 5 servings a week  Almonds, sunflower seeds, kidney beans, peas, lentils and other foods in this family are good sources of magnesium, potassium and protein. They're also full of fiber and phytochemicals, which are plant compounds that may protect against some cancers and cardiovascular disease.  Serving sizes are small and are intended to be consumed only a few times a week because these foods are high in calories. Examples of one serving include 1/3 cup nuts, 2 tablespoons seeds, or 1/2 cup cooked beans or peas.   Nuts sometimes get a bad rap because of their fat content, but they contain healthy types of fat -- monounsaturated fat and omega-3 fatty acids. They're high in calories, however, so eat them in moderation. Try adding them to stir-fries, salads or cereals.   Soybean-based products, such as tofu and tempeh, can be a good alternative to meat because they contain all of the amino acids your body needs to make a complete protein, just like meat.  Fats and oils: 2 to 3 servings a day  Fat helps your body absorb essential vitamins and helps your body's immune system. But too much fat increases your risk of heart disease, diabetes and obesity. The DASH diet strives for a healthy balance by limiting total fat to less than 30 percent of daily calories from fat, with a focus on the healthier monounsaturated fats.  Examples of one serving include 1 teaspoon soft margarine, 1 tablespoon  mayonnaise or 2 tablespoons salad dressing.  Saturated fat and trans fat are the main dietary culprits in increasing your risk of coronary artery disease. DASH helps keep your daily saturated fat to less than 6 percent of your total calories by limiting use of meat, butter, cheese, whole milk, cream and eggs in your diet, along with foods made from lard, solid shortenings, and palm and coconut oils.   Avoid trans fat, commonly found in such processed foods as crackers, baked goods and fried items.   Read food labels on margarine and salad dressing so that you can choose those that are lowest in saturated fat and free of trans fat.  Sweets: 5 servings or fewer a week  You don't have to banish sweets entirely while following the DASH diet -- just go easy on them. Examples of one serving include 1 tablespoon sugar, jelly or jam, 1/2 cup sorbet, or 1 cup lemonade.  When you eat sweets, choose those that are fat-free or low-fat, such as sorbets, fruit ices, jelly beans, hard candy, estrellita crackers or low-fat cookies.   Artificial sweeteners such as aspartame (NutraSweet, Equal) and sucralose (Splenda) may help satisfy your sweet tooth while sparing the sugar. But remember that you still must use them sensibly. It's OK to swap a diet cola for a regular cola, but not in place of a more nutritious beverage such as low-fat milk or even plain water.   Cut back on added sugar, which has no nutritional value but can pack on calories.  Drinking too much alcohol can increase blood pressure. The Dietary Guidelines for Americans recommends that men limit alcohol to no more than two drinks a day and women to one or less.  The DASH diet doesn't address caffeine consumption. The influence of caffeine on blood pressure remains unclear. But caffeine can cause your blood pressure to rise at least temporarily. If you already have high blood pressure or if you think caffeine is affecting your blood pressure, talk to your doctor about your  "caffeine consumption.  While the DASH diet is not a weight-loss program, you may indeed lose unwanted pounds because it can help guide you toward healthier food choices.  The DASH diet generally includes about 2,000 calories a day. If you're trying to lose weight, you may need to eat fewer calories. You may also need to adjust your serving goals based on your individual circumstances -- something your health care team can help you decide.  The foods at the core of the DASH diet are naturally low in sodium. So just by following the DASH diet, you're likely to reduce your sodium intake. You also reduce sodium further by:  Using sodium-free spices or flavorings with your food instead of salt   Not adding salt when cooking rice, pasta or hot cereal   Rinsing canned foods to remove some of the sodium   Buying foods labeled "no salt added," "sodium-free," "low sodium" or "very low sodium"  One teaspoon of table salt has 2,325 mg of sodium. When you read food labels, you may be surprised at just how much sodium some processed foods contain. Even low-fat soups, canned vegetables, ready-to-eat cereals and sliced turkey from the local deli -- foods you may have considered healthy -- often have lots of sodium.  You may notice a difference in taste when you choose low-sodium food and beverages. If things seem too bland, gradually introduce low-sodium foods and cut back on table salt until you reach your sodium goal. That'll give your palate time to adjust.  Using salt-free seasoning blends or herbs and spices may also ease the transition. It can take several weeks for your taste buds to get used to less salty foods.  Try these strategies to get started on the DASH diet:   Change gradually. If you now eat only one or two servings of fruits or vegetables a day, try to add a serving at lunch and one at dinner. Rather than switching to all whole grains, start by making one or two of your grain servings whole grains. Increasing " fruits, vegetables and whole grains gradually can also help prevent bloating or diarrhea that may occur if you aren't used to eating a diet with lots of fiber. You can also try over-the-counter products to help reduce gas from beans and vegetables.   Reward successes and forgive slip-ups. Reward yourself with a nonfood treat for your accomplishments -- rent a movie, purchase a book or get together with a friend. Everyone slips, especially when learning something new. Remember that changing your lifestyle is a long-term process. Find out what triggered your setback and then just  where you left off with the DASH diet.   Add physical activity. To boost your blood pressure lowering efforts even more, consider increasing your physical activity in addition to following the DASH diet. Combining both the DASH diet and physical activity makes it more likely that you'll reduce your blood pressure.   Get support if you need it. If you're having trouble sticking to your diet, talk to your doctor or dietitian about it. You might get some tips that will help you stick to the DASH diet.  Remember, healthy eating isn't an all-or-nothing proposition. What's most important is that, on average, you eat healthier foods with plenty of variety -- both to keep your diet nutritious and to avoid boredom or extremes. And with the DASH diet, you can have both.

## 2024-02-16 NOTE — PROGRESS NOTES
Encounter Diagnoses   Name Primary?    Hypertension associated with diabetes     Chronic atrial fibrillation     Exudative age-related macular degeneration, right eye, with active choroidal neovascularization     Type 2 diabetes mellitus with stage 3a chronic kidney disease, without long-term current use of insulin      Subjective:   Domitila Walters is a 83 y.o. male with hypertension. Not controlled today.Patient denies any exertional chest pain, dyspnea, palpitations, syncope, orthopnea, edema or paroxysmal nocturnal dyspnea.  Diabetic Review of Systems - medication compliance: compliant all of the time, diabetic diet compliance: compliant most of the time, home glucose monitoring: is performed regularly, values are usually normal, further diabetic ROS: no polyuria or polydipsia, no chest pain, dyspnea or TIA's, no unusual visual symptoms, no hypoglycemia, no medication side effects noted, has dysesthesias in the feet, last eye exam approximately less than 1 year ago.    Current Outpatient Medications   Medication Sig Dispense Refill    acarbose (PRECOSE) 25 MG Tab Take 1 tablet (25 mg total) by mouth 2 (two) times daily with meals. 180 tablet 3    beta-carotene,A,-vits C,E/mins (OCUVITE ORAL) Take by mouth.      calcium-vitamin D3 (OS-BROOK[place]NN 500 + D3) 500 mg-5 mcg (200 unit) per tablet Take 1 tablet by mouth 2 (two) times daily with meals.      cholecalciferol, vitamin D3, 1,000 unit capsule Take 1 capsule (1,000 Units total) by mouth once daily. 90 capsule 11    ELIQUIS 5 mg Tab TAKE ONE TABLET BY MOUTH TWICE DAILY 180 tablet 3    FOLBIC 2.5-25-2 mg Tab TAKE ONE TABLET BY MOUTH DAILY 90 tablet 3    omeprazole (PRILOSEC) 20 MG capsule TAKE ONE CAPSULE BY MOUTH ONCE DAILY 90 capsule 2    pravastatin (PRAVACHOL) 40 MG tablet TAKE ONE TABLET BY MOUTH ONCE DAILY 90 tablet 3    blood sugar diagnostic Strp 1 strip by Misc.(Non-Drug; Combo Route) route 2 (two) times daily with meals. 100 strip 11    blood-glucose meter Misc  "1 Device by Misc.(Non-Drug; Combo Route) route 2 (two) times daily with meals. 1 each 0    diltiaZEM (CARDIZEM CD) 300 MG 24 hr capsule TAKE ONE CAPSULE BY MOUTH EVERY DAY 90 capsule 2    lancets Misc 1 each by Misc.(Non-Drug; Combo Route) route 2 (two) times daily with meals. 100 each 11    lancing device with lancets Kit 1 Device by Misc.(Non-Drug; Combo Route) route 2 (two) times daily with meals. 1 each 0     No current facility-administered medications for this visit.      Hypertension ROS: taking medications as instructed, no medication side effects noted, home BP monitoring in range of 120's systolic over 80's diastolic, no TIA's, possible neurological symptoms PAD, no chest pain on exertion, no dyspnea on exertion, no swelling of ankles, no orthostatic dizziness or lightheadedness, and no orthopnea or paroxysmal nocturnal dyspnea.   New concerns: arthritis.     Objective:   BP (!) 138/90 (BP Location: Left arm, Patient Position: Sitting, BP Method: Medium (Manual))   Pulse 67   Temp 97.9 °F (36.6 °C) (Oral)   Ht 6' 1" (1.854 m)   Wt 92.7 kg (204 lb 5.9 oz)   SpO2 97%   BMI 26.96 kg/m²    Appearance alert, well appearing, and in no distress, oriented to person, place, and time, normal appearing weight, acyanotic, in no respiratory distress, improved, and well hydrated.  General exam BP noted to be mildly elevated today in office, BP noted to be mildly elevated today in office though normal at other visits, S1, S2 normal, no gallop, no murmur, chest clear, no JVD, no HSM, no edema.   Lab review: labs are reviewed, up to date and normal, labs reviewed,, orders written for new lab studies as appropriate; see orders.   Lab Results   Component Value Date    WBC 7.74 03/23/2022    HGB 15.2 03/23/2022    HCT 46.9 03/23/2022     03/23/2022    CHOL 161 06/13/2023    TRIG 66 06/13/2023    HDL 50 06/13/2023    ALT 16 02/08/2024    AST 16 02/08/2024     02/08/2024    K 4.7 02/08/2024     " 02/08/2024    CREATININE 1.2 02/08/2024    BUN 18 02/08/2024    CO2 25 02/08/2024    TSH 1.462 05/31/2018    PSA 2.2 01/29/2016    INR 1.0 09/28/2017    HGBA1C 5.9 (H) 02/08/2024       Assessment:    Hypertension well controlled, loss of control due to intercurrent illness, and needs to follow diet more regularly.   Hypertension associated with diabetes  -     MICROALBUMIN / CREATININE RATIO URINE; Future; Expected date: 02/15/2024  -     Comprehensive metabolic panel; Future; Expected date: 02/15/2024  -     Lipid panel; Future; Expected date: 02/15/2024  -     Hemoglobin A1c; Future; Expected date: 02/15/2024    Chronic atrial fibrillation    Exudative age-related macular degeneration, right eye, with active choroidal neovascularization    Type 2 diabetes mellitus with stage 3a chronic kidney disease, without long-term current use of insulin  -     Hemoglobin A1c; Future; Expected date: 02/15/2024    I counseled the patient on HTN education, management and recommendations.  I recommended weight loss toward a BMI < 25, avoidance of salt and the DASH diet, regular cardio exercise a minimum of 150 minutes per week and medications if indicated.  Printed materials were given. The goal is < 140/90 unless otherwise specified.    I have recommended that this patient have a immunization for Covid, RSV, and Shingles but he declines at this time. I have discussed the risks and benefits of this procedure with him. The patient verbalizes understanding.    Plan:   Current treatment plan is effective, no change in therapy.  Lab results and schedule of future lab studies reviewed with patient.  Repeat labs ordered prior to next appointment.  Reviewed diet, exercise and weight control.  Cardiovascular risk and specific lipid/LDL goals reviewed.  For claudication, exercise to point of pain, rest, then continue.  Follow up: 4 months and as needed..  Discussed health maintenance guidelines appropriate for age.

## 2024-03-09 DIAGNOSIS — E78.5 HYPERLIPIDEMIA ASSOCIATED WITH TYPE 2 DIABETES MELLITUS: ICD-10-CM

## 2024-03-09 DIAGNOSIS — I48.20 CHRONIC ATRIAL FIBRILLATION: ICD-10-CM

## 2024-03-09 DIAGNOSIS — E11.69 HYPERLIPIDEMIA ASSOCIATED WITH TYPE 2 DIABETES MELLITUS: ICD-10-CM

## 2024-03-09 DIAGNOSIS — E11.59 CONTROLLED TYPE 2 DIABETES MELLITUS WITH OTHER CIRCULATORY COMPLICATION, WITHOUT LONG-TERM CURRENT USE OF INSULIN: ICD-10-CM

## 2024-03-09 RX ORDER — OMEPRAZOLE 20 MG/1
CAPSULE, DELAYED RELEASE ORAL
Qty: 90 CAPSULE | Refills: 3 | Status: SHIPPED | OUTPATIENT
Start: 2024-03-09

## 2024-03-09 RX ORDER — ACARBOSE 25 MG/1
25 TABLET ORAL 2 TIMES DAILY WITH MEALS
Qty: 180 TABLET | Refills: 1 | Status: SHIPPED | OUTPATIENT
Start: 2024-03-09

## 2024-03-09 NOTE — TELEPHONE ENCOUNTER
No care due was identified.  [facility] Embedded Care Due Messages. Reference number: [id number].   3/09/2024 8:04:40 AM CST

## 2024-03-10 NOTE — TELEPHONE ENCOUNTER
Refill Routing Note   Refill Routing Note   Medication(s) are not appropriate for processing by [facility] for the following reason(s):        Required vitals abnormal    [facility] action(s):  Approve  Defer             Appointments  past 12m or future 3m with PCP    Date Provider   Last Visit   2/15/2024 Isiah Crespo MD   Next Visit   8/15/2024 Isiah Crespo MD   ED visits in past 90 days: 0        Note composed:9:43 PM 03/09/2024                 I personally performed the service described in the documentation recorded by the scribe in my presence, and it accurately and completely records my words and actions.

## 2024-03-15 RX ORDER — DILTIAZEM HYDROCHLORIDE 300 MG/1
CAPSULE, COATED, EXTENDED RELEASE ORAL
Qty: 90 CAPSULE | Refills: 3 | Status: SHIPPED | OUTPATIENT
Start: 2024-03-15

## 2024-03-28 ENCOUNTER — PATIENT MESSAGE (OUTPATIENT)
Dept: FAMILY MEDICINE | Facility: CLINIC | Age: 84
End: 2024-03-28
Payer: MEDICARE

## 2024-06-04 DIAGNOSIS — M79.2 PERIPHERAL NEUROPATHIC PAIN: ICD-10-CM

## 2024-06-04 NOTE — TELEPHONE ENCOUNTER
Refill Routing Note   Medication(s) are not appropriate for processing by [facility] for the following reason(s):        Outside of protocol    [facility] action(s):  Route             Appointments  past 12m or future 3m with PCP    Date Provider   Last Visit   2/15/2024 Isiah Crespo MD   Next Visit   8/15/2024 Isiah Crespo MD   ED visits in past 90 days: 0        Note composed:8:46 AM 06/04/2024

## 2024-06-11 RX ORDER — FOLIC ACID-PYRIDOXINE-CYANOCOBALAMIN TAB 2.5-25-2 MG 2.5-25-2 MG
TAB ORAL
Qty: 90 TABLET | Refills: 3 | Status: SHIPPED | OUTPATIENT
Start: 2024-06-11

## 2024-08-08 ENCOUNTER — LAB VISIT (OUTPATIENT)
Dept: LAB | Facility: HOSPITAL | Age: 84
End: 2024-08-08
Attending: FAMILY MEDICINE
Payer: MEDICARE

## 2024-08-08 DIAGNOSIS — N18.31 TYPE 2 DIABETES MELLITUS WITH STAGE 3A CHRONIC KIDNEY DISEASE, WITHOUT LONG-TERM CURRENT USE OF INSULIN: ICD-10-CM

## 2024-08-08 DIAGNOSIS — E11.59 HYPERTENSION ASSOCIATED WITH DIABETES: ICD-10-CM

## 2024-08-08 DIAGNOSIS — I15.2 HYPERTENSION ASSOCIATED WITH DIABETES: ICD-10-CM

## 2024-08-08 DIAGNOSIS — E11.22 TYPE 2 DIABETES MELLITUS WITH STAGE 3A CHRONIC KIDNEY DISEASE, WITHOUT LONG-TERM CURRENT USE OF INSULIN: ICD-10-CM

## 2024-08-08 LAB
ALBUMIN SERPL BCP-MCNC: 3.8 G/DL (ref 3.5–5.2)
ALP SERPL-CCNC: 65 U/L (ref 55–135)
ALT SERPL W/O P-5'-P-CCNC: 19 U/L (ref 10–44)
ANION GAP SERPL CALC-SCNC: 12 MMOL/L (ref 8–16)
AST SERPL-CCNC: 17 U/L (ref 10–40)
BILIRUB SERPL-MCNC: 0.7 MG/DL (ref 0.1–1)
BUN SERPL-MCNC: 15 MG/DL (ref 8–23)
CALCIUM SERPL-MCNC: 9.3 MG/DL (ref 8.7–10.5)
CHLORIDE SERPL-SCNC: 106 MMOL/L (ref 95–110)
CHOLEST SERPL-MCNC: 155 MG/DL (ref 120–199)
CHOLEST/HDLC SERPL: 3.2 {RATIO} (ref 2–5)
CO2 SERPL-SCNC: 22 MMOL/L (ref 23–29)
CREAT SERPL-MCNC: 1.3 MG/DL (ref 0.5–1.4)
EST. GFR  (NO RACE VARIABLE): 53.5 ML/MIN/1.73 M^2
ESTIMATED AVG GLUCOSE: 126 MG/DL (ref 68–131)
GLUCOSE SERPL-MCNC: 132 MG/DL (ref 70–110)
HBA1C MFR BLD: 6 % (ref 4–5.6)
HDLC SERPL-MCNC: 48 MG/DL (ref 40–75)
HDLC SERPL: 31 % (ref 20–50)
LDLC SERPL CALC-MCNC: 87.8 MG/DL (ref 63–159)
NONHDLC SERPL-MCNC: 107 MG/DL
POTASSIUM SERPL-SCNC: 4.4 MMOL/L (ref 3.5–5.1)
PROT SERPL-MCNC: 6.6 G/DL (ref 6–8.4)
SODIUM SERPL-SCNC: 140 MMOL/L (ref 136–145)
TRIGL SERPL-MCNC: 96 MG/DL (ref 30–150)

## 2024-08-08 PROCEDURE — 80061 LIPID PANEL: CPT | Performed by: FAMILY MEDICINE

## 2024-08-08 PROCEDURE — 80053 COMPREHEN METABOLIC PANEL: CPT | Performed by: FAMILY MEDICINE

## 2024-08-08 PROCEDURE — 83036 HEMOGLOBIN GLYCOSYLATED A1C: CPT | Performed by: FAMILY MEDICINE

## 2024-08-08 PROCEDURE — 36415 COLL VENOUS BLD VENIPUNCTURE: CPT | Mod: PO | Performed by: FAMILY MEDICINE

## 2024-08-08 NOTE — PROGRESS NOTES
I note some minor lab abnormalities that have been stable over time, these are of doubtful clinical significance.  Please continue same medications.  Fasting blood sugar mildly elevated 132.  Creatinine 1.3 which is mildly elevated.  Kidney functions are spare please consider increase hydration.  Hemoglobin A1c of 6.0 which is beyond the expectations.  Please follow blood tests within the next 5-6 months.  The cholesterol Is within normal limits

## 2024-08-15 ENCOUNTER — OFFICE VISIT (OUTPATIENT)
Dept: PRIMARY CARE CLINIC | Facility: CLINIC | Age: 84
End: 2024-08-15
Payer: MEDICARE

## 2024-08-15 VITALS
DIASTOLIC BLOOD PRESSURE: 84 MMHG | HEIGHT: 73 IN | TEMPERATURE: 98 F | WEIGHT: 206.38 LBS | OXYGEN SATURATION: 97 % | BODY MASS INDEX: 27.35 KG/M2 | HEART RATE: 62 BPM | SYSTOLIC BLOOD PRESSURE: 118 MMHG

## 2024-08-15 DIAGNOSIS — I10 HTN, GOAL BELOW 130/80: ICD-10-CM

## 2024-08-15 DIAGNOSIS — E11.22 TYPE 2 DIABETES MELLITUS WITH STAGE 3A CHRONIC KIDNEY DISEASE, WITHOUT LONG-TERM CURRENT USE OF INSULIN: Primary | ICD-10-CM

## 2024-08-15 DIAGNOSIS — N18.31 TYPE 2 DIABETES MELLITUS WITH STAGE 3A CHRONIC KIDNEY DISEASE, WITHOUT LONG-TERM CURRENT USE OF INSULIN: Primary | ICD-10-CM

## 2024-08-15 DIAGNOSIS — N18.31 STAGE 3A CHRONIC KIDNEY DISEASE: ICD-10-CM

## 2024-08-15 PROCEDURE — 99999 PR PBB SHADOW E&M-EST. PATIENT-LVL IV: CPT | Mod: PBBFAC,,, | Performed by: FAMILY MEDICINE

## 2024-08-15 PROCEDURE — 99214 OFFICE O/P EST MOD 30 MIN: CPT | Mod: S$PBB,,, | Performed by: FAMILY MEDICINE

## 2024-08-15 PROCEDURE — 99214 OFFICE O/P EST MOD 30 MIN: CPT | Mod: PBBFAC,PO | Performed by: FAMILY MEDICINE

## 2024-08-15 NOTE — PATIENT INSTRUCTIONS
Patient Education       Type 2 Diabetes Discharge Instructions   About this topic   Type 2 diabetes is the most common type of diabetes. If you have this illness, your body does not make enough insulin or does not correctly use the insulin it does make. When you eat, your body breaks down all sugars and starches into glucose. Your body needs insulin to use glucose for energy. The insulin takes the glucose from your blood into your cells. If you do not have enough insulin, or your body does not use the insulin well, the glucose or sugar stays in your blood instead of going into your cells. This causes your blood sugar levels to be too high. Over time, this can damage your heart, nerves, eyes, kidneys, and other organs.     What care is needed at home?   Learn how to take care of your diabetes.  Ask your doctor what you need to do when you go home. Make sure you ask questions if you do not understand what the doctor says. This way you will know what you need to do.  Based on what diabetes drugs you take, you might need to check your blood sugar regularly at home. But not everyone with type 2 diabetes needs to do this. If you need to, your doctor will teach you how to check your blood sugar. Ask what the goal numbers are for your blood sugar. Keep a list of your blood sugar levels. This will help you learn what causes high or low readings and help you manage your diabetes.     Take your diabetes drugs as directed. Ask what to do if you miss a dose of your diabetes drugs.  Learn when, what, and how much to eat.  Be active. Walk, garden, or do something active for 30 minutes or more on most days of the week. This will also help you control your weight. Ask your doctor for proper food and exercise programs to follow.  Check your feet often. Look for blisters or sores. Always wear socks and shoes. Never walk barefoot, especially outdoors. Take special care around the pool and at the beach, as these surfaces may be  extremely hot and burn your feet. Report any problems with your feet to your doctor.  Wear a medical ID.  Limit or avoid beer, wine, and mixed drinks (alcohol).  If you smoke, try to quit. Your doctor or nurse can help.  Talk with your doctor about if you need to check your blood sugar at home.  If you are overweight, ask your doctor if you would be a good candidate for bariatric surgery as this can reverse diabetes in some cases.  What follow-up care is needed?   Your doctor may ask you to make visits to the office to check on your progress. Be sure to keep these visits.  What drugs may be needed?   Diabetes drugs will help control your blood sugar. You may have more than one diabetes drug. Your doctor may order drugs for you to take by mouth or insulin as a shot. You will be trained on how to give insulin shots, if needed. Talk to your doctor about your diabetes drugs and what you need to do when you go home.  Will physical activity be limited?   Being active can lower blood sugar and blood pressure. It can also help control weight. Be sure to check with your doctor before starting any exercise program.  Try to walk, bike, or swim every day. Start with 5 to 10 minutes each day. Work up to about 30 minutes most days.  Drink lots of water during workouts.  What changes to diet are needed?   Eating a healthy diet is important. This means you need to eat regularly throughout the day. You need to include a variety of foods such as fruits and vegetables, whole grains, nonfat dairy products, and lean meats. Do not eat too much food at one time and do not skip meals. Limit foods high in sugar like sweets, desserts, and fruit juices. Ask your doctor about what kind of diet is right for you.  What problems could happen?   Heart, kidney, and nerve problems  Foot problems. Sores and infection may also happen.  Eye problems  Dangerously high blood sugar levels requiring emergency treatment  Severe infections  Talk with your  doctor often. Other drugs or care may be needed to treat or prevent these problems.  When do I need to call the doctor?   You have signs of high blood sugar like you:  Are more thirsty  Are urinating more often  Have new shortness of breath  Have belly pain, an upset stomach, or are throwing up.  Are more tired than normal  Have a very dry mouth or a fruity breath odor  Signs of infection. These include a fever of 100.4°F (38°C) or higher, chills, or a wound that will not heal.  Blurred vision  Chest pain  Swelling in your legs  Change in color and odor of your feet  Blood sugar that remains high and does not respond to treatment  Helpful tips   Talk to your doctor about getting a flu shot and pneumonia shot.  Teach Back: Helping You Understand   The Teach Back Method helps you understand the information we are giving you. After you talk with the staff, tell them in your own words what you learned. This helps to make sure the staff has described each thing clearly. It also helps to explain things that may have been confusing. Before going home, make sure you can do these:  I can tell you about my condition.  I can tell you what I need to do to control my blood sugar.  I can tell you what I will do if I have signs of high blood sugar.  Where can I learn more?   Center for Disease Control and Prevention  https://www.cdc.gov/diabetes/basics/type2.html   International Diabetes Foundation  https://www.idf.org/aboutdiabetes/type-2-diabetes.html   UpToDate  https://www.[employer].com/contents/type-2-diabetes-overview-beyond-the-basics   Last Reviewed Date   2021-05-04  Consumer Information Use and Disclaimer   This information is not specific medical advice and does not replace information you receive from your health care provider. This is only a brief summary of general information. It does NOT include all information about conditions, illnesses, injuries, tests, procedures, treatments, therapies, discharge instructions or  life-style choices that may apply to you. You must talk with your health care provider for complete information about your health and treatment options. This information should not be used to decide whether or not to accept your health care providers advice, instructions or recommendations. Only your health care provider has the knowledge and training to provide advice that is right for you.  Copyright   Copyright © 2021 UpToDate, Inc. and its affiliates and/or licensors. All rights reserved. DASH diet for Hypertension and Healthy Eating  Provided by the [facility]    Healthy Lifestyle   Nutrition and healthy eating  The DASH diet emphasizes portion size, eating a variety of foods and getting the right amount of nutrients. Discover how DASH can improve your health and lower your blood pressure.  By [facility] Staff   DASH stands for Dietary Approaches to Stop Hypertension. The DASH diet is a lifelong approach to healthy eating that's designed to help treat or prevent high blood pressure (hypertension). The DASH diet encourages you to reduce the sodium in your diet and eat a variety of foods rich in nutrients that help lower blood pressure, such as potassium, calcium and magnesium.  By following the DASH diet, you may be able to reduce your blood pressure by a few points in just two weeks. Over time, your systolic blood pressure could drop by eight to 14 points, which can make a significant difference in your health risks.  Because the DASH diet is a healthy way of eating, it offers health benefits besides just lowering blood pressure. The DASH diet is also in line with dietary recommendations to prevent osteoporosis, cancer, heart disease, stroke and diabetes.  The DASH diet emphasizes vegetables, fruits and low-fat dairy foods -- and moderate amounts of whole grains, fish, poultry and nuts.  In addition to the standard DASH diet, there is also a lower sodium version of the diet. You can choose the version of the  "diet that meets your health needs:  Standard DASH diet. You can consume up to 2,300 milligrams (mg) of sodium a day.   Lower sodium DASH diet. You can consume up to 1,500 mg of sodium a day.  Both versions of the DASH diet aim to reduce the amount of sodium in your diet compared with what you might get in a typical American diet, which can amount to a whopping 3,400 mg of sodium a day or more.  The standard DASH diet meets the recommendation from the Dietary Guidelines for Americans to keep daily sodium intake to less than 2,300 mg a day.  The American Heart Association recommends 1,500 mg a day of sodium as an upper limit for all adults. If you aren't sure what sodium level is right for you, talk to your doctor.  Both versions of the DASH diet include lots of whole grains, fruits, vegetables and low-fat dairy products. The DASH diet also includes some fish, poultry and legumes, and encourages a small amount of nuts and seeds a few times a week.   You can eat red meat, sweets and fats in small amounts. The DASH diet is low in saturated fat, cholesterol and total fat.  Here's a look at the recommended servings from each food group for the 2,883-sdudcbs-a-day DASH diet.  Grains: 6 to 8 servings a day  Grains include bread, cereal, rice and pasta. Examples of one serving of grains include 1 slice whole-wheat bread, 1 ounce dry cereal, or 1/2 cup cooked cereal, rice or pasta.  Focus on whole grains because they have more fiber and nutrients than do refined grains. For instance, use brown rice instead of white rice, whole-wheat pasta instead of regular pasta and whole-grain bread instead of white bread. Look for products labeled "100 percent whole grain" or "100 percent whole wheat."   Grains are naturally low in fat. Keep them this way by avoiding butter, cream and cheese sauces.  Vegetables: 4 to 5 servings a day  Tomatoes, carrots, broccoli, sweet potatoes, greens and other vegetables are full of fiber, vitamins, and " such minerals as potassium and magnesium. Examples of one serving include 1 cup raw leafy green vegetables or 1/2 cup cut-up raw or cooked vegetables.  Don't think of vegetables only as side dishes -- a hearty blend of vegetables served over brown rice or whole-wheat noodles can serve as the main dish for a meal.   Fresh and frozen vegetables are both good choices. When buying frozen and canned vegetables, choose those labeled as low sodium or without added salt.   To increase the number of servings you fit in daily, be creative. In a stir-townsend, for instance, cut the amount of meat in half and double up on the vegetables.  Fruits: 4 to 5 servings a day  Many fruits need little preparation to become a healthy part of a meal or snack. Like vegetables, they're packed with fiber, potassium and magnesium and are typically low in fat -- coconuts are an exception. Examples of one serving include one medium fruit, 1/2 cup fresh, frozen or canned fruit, or 4 ounces of juice.  Have a piece of fruit with meals and one as a snack, then round out your day with a dessert of fresh fruits topped with a dollop of low-fat yogurt.   Leave on edible peels whenever possible. The peels of apples, pears and most fruits with pits add interesting texture to recipes and contain healthy nutrients and fiber.   Remember that citrus fruits and juices, such as grapefruit, can interact with certain medications, so check with your doctor or pharmacist to see if they're OK for you.   If you choose canned fruit or juice, make sure no sugar is added.  Dairy: 2 to 3 servings a day  Milk, yogurt, cheese and other dairy products are major sources of calcium, vitamin D and protein. But the key is to make sure that you choose dairy products that are low fat or fat-free because otherwise they can be a major source of fat -- and most of it is saturated. Examples of one serving include 1 cup skim or 1 percent milk, 1 cup low fat yogurt, or 1 1/2 ounces  part-skim cheese.  Low-fat or fat-free frozen yogurt can help you boost the amount of dairy products you eat while offering a sweet treat. Add fruit for a healthy twist.   If you have trouble digesting dairy products, choose lactose-free products or consider taking an over-the-counter product that contains the enzyme lactase, which can reduce or prevent the symptoms of lactose intolerance.   Go easy on regular and even fat-free cheeses because they are typically high in sodium.  Lean meat, poultry and fish: 6 servings or fewer a day  Meat can be a rich source of protein, B vitamins, iron and zinc. Choose lean varieties and aim for no more than 6 ounces a day. Cutting back on your meat portion will allow room for more vegetables.  Trim away skin and fat from poultry and meat and then bake, broil, grill or roast instead of frying in fat.   Eat heart-healthy fish, such as salmon, herring and tuna. These types of fish are high in omega-3 fatty acids, which can help lower your total cholesterol.  Nuts, seeds and legumes: 4 to 5 servings a week  Almonds, sunflower seeds, kidney beans, peas, lentils and other foods in this family are good sources of magnesium, potassium and protein. They're also full of fiber and phytochemicals, which are plant compounds that may protect against some cancers and cardiovascular disease.  Serving sizes are small and are intended to be consumed only a few times a week because these foods are high in calories. Examples of one serving include 1/3 cup nuts, 2 tablespoons seeds, or 1/2 cup cooked beans or peas.   Nuts sometimes get a bad rap because of their fat content, but they contain healthy types of fat -- monounsaturated fat and omega-3 fatty acids. They're high in calories, however, so eat them in moderation. Try adding them to stir-fries, salads or cereals.   Soybean-based products, such as tofu and tempeh, can be a good alternative to meat because they contain all of the amino acids your  body needs to make a complete protein, just like meat.  Fats and oils: 2 to 3 servings a day  Fat helps your body absorb essential vitamins and helps your body's immune system. But too much fat increases your risk of heart disease, diabetes and obesity. The DASH diet strives for a healthy balance by limiting total fat to less than 30 percent of daily calories from fat, with a focus on the healthier monounsaturated fats.  Examples of one serving include 1 teaspoon soft margarine, 1 tablespoon mayonnaise or 2 tablespoons salad dressing.  Saturated fat and trans fat are the main dietary culprits in increasing your risk of coronary artery disease. DASH helps keep your daily saturated fat to less than 6 percent of your total calories by limiting use of meat, butter, cheese, whole milk, cream and eggs in your diet, along with foods made from lard, solid shortenings, and palm and coconut oils.   Avoid trans fat, commonly found in such processed foods as crackers, baked goods and fried items.   Read food labels on margarine and salad dressing so that you can choose those that are lowest in saturated fat and free of trans fat.  Sweets: 5 servings or fewer a week  You don't have to banish sweets entirely while following the DASH diet -- just go easy on them. Examples of one serving include 1 tablespoon sugar, jelly or jam, 1/2 cup sorbet, or 1 cup lemonade.  When you eat sweets, choose those that are fat-free or low-fat, such as sorbets, fruit ices, jelly beans, hard candy, estrellita crackers or low-fat cookies.   Artificial sweeteners such as aspartame (NutraSweet, Equal) and sucralose (Splenda) may help satisfy your sweet tooth while sparing the sugar. But remember that you still must use them sensibly. It's OK to swap a diet cola for a regular cola, but not in place of a more nutritious beverage such as low-fat milk or even plain water.   Cut back on added sugar, which has no nutritional value but can pack on  "calories.  Drinking too much alcohol can increase blood pressure. The Dietary Guidelines for Americans recommends that men limit alcohol to no more than two drinks a day and women to one or less.  The DASH diet doesn't address caffeine consumption. The influence of caffeine on blood pressure remains unclear. But caffeine can cause your blood pressure to rise at least temporarily. If you already have high blood pressure or if you think caffeine is affecting your blood pressure, talk to your doctor about your caffeine consumption.  While the DASH diet is not a weight-loss program, you may indeed lose unwanted pounds because it can help guide you toward healthier food choices.  The DASH diet generally includes about 2,000 calories a day. If you're trying to lose weight, you may need to eat fewer calories. You may also need to adjust your serving goals based on your individual circumstances -- something your health care team can help you decide.  The foods at the core of the DASH diet are naturally low in sodium. So just by following the DASH diet, you're likely to reduce your sodium intake. You also reduce sodium further by:  Using sodium-free spices or flavorings with your food instead of salt   Not adding salt when cooking rice, pasta or hot cereal   Rinsing canned foods to remove some of the sodium   Buying foods labeled "no salt added," "sodium-free," "low sodium" or "very low sodium"  One teaspoon of table salt has 2,325 mg of sodium. When you read food labels, you may be surprised at just how much sodium some processed foods contain. Even low-fat soups, canned vegetables, ready-to-eat cereals and sliced turkey from the local deli -- foods you may have considered healthy -- often have lots of sodium.  You may notice a difference in taste when you choose low-sodium food and beverages. If things seem too bland, gradually introduce low-sodium foods and cut back on table salt until you reach your sodium goal. That'll " give your palate time to adjust.  Using salt-free seasoning blends or herbs and spices may also ease the transition. It can take several weeks for your taste buds to get used to less salty foods.  Try these strategies to get started on the DASH diet:   Change gradually. If you now eat only one or two servings of fruits or vegetables a day, try to add a serving at lunch and one at dinner. Rather than switching to all whole grains, start by making one or two of your grain servings whole grains. Increasing fruits, vegetables and whole grains gradually can also help prevent bloating or diarrhea that may occur if you aren't used to eating a diet with lots of fiber. You can also try over-the-counter products to help reduce gas from beans and vegetables.   Reward successes and forgive slip-ups. Reward yourself with a nonfood treat for your accomplishments -- rent a movie, purchase a book or get together with a friend. Everyone slips, especially when learning something new. Remember that changing your lifestyle is a long-term process. Find out what triggered your setback and then just  where you left off with the DASH diet.   Add physical activity. To boost your blood pressure lowering efforts even more, consider increasing your physical activity in addition to following the DASH diet. Combining both the DASH diet and physical activity makes it more likely that you'll reduce your blood pressure.   Get support if you need it. If you're having trouble sticking to your diet, talk to your doctor or dietitian about it. You might get some tips that will help you stick to the DASH diet.  Remember, healthy eating isn't an all-or-nothing proposition. What's most important is that, on average, you eat healthier foods with plenty of variety -- both to keep your diet nutritious and to avoid boredom or extremes. And with the DASH diet, you can have both. DASH diet for Hypertension and Healthy Eating  Provided by the [facility]    Healthy Lifestyle   Nutrition and healthy eating  The DASH diet emphasizes portion size, eating a variety of foods and getting the right amount of nutrients. Discover how DASH can improve your health and lower your blood pressure.  By [facility] Staff   DASH stands for Dietary Approaches to Stop Hypertension. The DASH diet is a lifelong approach to healthy eating that's designed to help treat or prevent high blood pressure (hypertension). The DASH diet encourages you to reduce the sodium in your diet and eat a variety of foods rich in nutrients that help lower blood pressure, such as potassium, calcium and magnesium.  By following the DASH diet, you may be able to reduce your blood pressure by a few points in just two weeks. Over time, your systolic blood pressure could drop by eight to 14 points, which can make a significant difference in your health risks.  Because the DASH diet is a healthy way of eating, it offers health benefits besides just lowering blood pressure. The DASH diet is also in line with dietary recommendations to prevent osteoporosis, cancer, heart disease, stroke and diabetes.  The DASH diet emphasizes vegetables, fruits and low-fat dairy foods -- and moderate amounts of whole grains, fish, poultry and nuts.  In addition to the standard DASH diet, there is also a lower sodium version of the diet. You can choose the version of the diet that meets your health needs:  Standard DASH diet. You can consume up to 2,300 milligrams (mg) of sodium a day.   Lower sodium DASH diet. You can consume up to 1,500 mg of sodium a day.  Both versions of the DASH diet aim to reduce the amount of sodium in your diet compared with what you might get in a typical American diet, which can amount to a whopping 3,400 mg of sodium a day or more.  The standard DASH diet meets the recommendation from the Dietary Guidelines for Americans to keep daily sodium intake to less than 2,300 mg a day.  The American Heart  "Association recommends 1,500 mg a day of sodium as an upper limit for all adults. If you aren't sure what sodium level is right for you, talk to your doctor.  Both versions of the DASH diet include lots of whole grains, fruits, vegetables and low-fat dairy products. The DASH diet also includes some fish, poultry and legumes, and encourages a small amount of nuts and seeds a few times a week.   You can eat red meat, sweets and fats in small amounts. The DASH diet is low in saturated fat, cholesterol and total fat.  Here's a look at the recommended servings from each food group for the 2,894-xbbwfcd-n-day DASH diet.  Grains: 6 to 8 servings a day  Grains include bread, cereal, rice and pasta. Examples of one serving of grains include 1 slice whole-wheat bread, 1 ounce dry cereal, or 1/2 cup cooked cereal, rice or pasta.  Focus on whole grains because they have more fiber and nutrients than do refined grains. For instance, use brown rice instead of white rice, whole-wheat pasta instead of regular pasta and whole-grain bread instead of white bread. Look for products labeled "100 percent whole grain" or "100 percent whole wheat."   Grains are naturally low in fat. Keep them this way by avoiding butter, cream and cheese sauces.  Vegetables: 4 to 5 servings a day  Tomatoes, carrots, broccoli, sweet potatoes, greens and other vegetables are full of fiber, vitamins, and such minerals as potassium and magnesium. Examples of one serving include 1 cup raw leafy green vegetables or 1/2 cup cut-up raw or cooked vegetables.  Don't think of vegetables only as side dishes -- a hearty blend of vegetables served over brown rice or whole-wheat noodles can serve as the main dish for a meal.   Fresh and frozen vegetables are both good choices. When buying frozen and canned vegetables, choose those labeled as low sodium or without added salt.   To increase the number of servings you fit in daily, be creative. In a stir-townsend, for instance, " cut the amount of meat in half and double up on the vegetables.  Fruits: 4 to 5 servings a day  Many fruits need little preparation to become a healthy part of a meal or snack. Like vegetables, they're packed with fiber, potassium and magnesium and are typically low in fat -- coconuts are an exception. Examples of one serving include one medium fruit, 1/2 cup fresh, frozen or canned fruit, or 4 ounces of juice.  Have a piece of fruit with meals and one as a snack, then round out your day with a dessert of fresh fruits topped with a dollop of low-fat yogurt.   Leave on edible peels whenever possible. The peels of apples, pears and most fruits with pits add interesting texture to recipes and contain healthy nutrients and fiber.   Remember that citrus fruits and juices, such as grapefruit, can interact with certain medications, so check with your doctor or pharmacist to see if they're OK for you.   If you choose canned fruit or juice, make sure no sugar is added.  Dairy: 2 to 3 servings a day  Milk, yogurt, cheese and other dairy products are major sources of calcium, vitamin D and protein. But the key is to make sure that you choose dairy products that are low fat or fat-free because otherwise they can be a major source of fat -- and most of it is saturated. Examples of one serving include 1 cup skim or 1 percent milk, 1 cup low fat yogurt, or 1 1/2 ounces part-skim cheese.  Low-fat or fat-free frozen yogurt can help you boost the amount of dairy products you eat while offering a sweet treat. Add fruit for a healthy twist.   If you have trouble digesting dairy products, choose lactose-free products or consider taking an over-the-counter product that contains the enzyme lactase, which can reduce or prevent the symptoms of lactose intolerance.   Go easy on regular and even fat-free cheeses because they are typically high in sodium.  Lean meat, poultry and fish: 6 servings or fewer a day  Meat can be a rich source of  protein, B vitamins, iron and zinc. Choose lean varieties and aim for no more than 6 ounces a day. Cutting back on your meat portion will allow room for more vegetables.  Trim away skin and fat from poultry and meat and then bake, broil, grill or roast instead of frying in fat.   Eat heart-healthy fish, such as salmon, herring and tuna. These types of fish are high in omega-3 fatty acids, which can help lower your total cholesterol.  Nuts, seeds and legumes: 4 to 5 servings a week  Almonds, sunflower seeds, kidney beans, peas, lentils and other foods in this family are good sources of magnesium, potassium and protein. They're also full of fiber and phytochemicals, which are plant compounds that may protect against some cancers and cardiovascular disease.  Serving sizes are small and are intended to be consumed only a few times a week because these foods are high in calories. Examples of one serving include 1/3 cup nuts, 2 tablespoons seeds, or 1/2 cup cooked beans or peas.   Nuts sometimes get a bad rap because of their fat content, but they contain healthy types of fat -- monounsaturated fat and omega-3 fatty acids. They're high in calories, however, so eat them in moderation. Try adding them to stir-fries, salads or cereals.   Soybean-based products, such as tofu and tempeh, can be a good alternative to meat because they contain all of the amino acids your body needs to make a complete protein, just like meat.  Fats and oils: 2 to 3 servings a day  Fat helps your body absorb essential vitamins and helps your body's immune system. But too much fat increases your risk of heart disease, diabetes and obesity. The DASH diet strives for a healthy balance by limiting total fat to less than 30 percent of daily calories from fat, with a focus on the healthier monounsaturated fats.  Examples of one serving include 1 teaspoon soft margarine, 1 tablespoon mayonnaise or 2 tablespoons salad dressing.  Saturated fat and trans  fat are the main dietary culprits in increasing your risk of coronary artery disease. DASH helps keep your daily saturated fat to less than 6 percent of your total calories by limiting use of meat, butter, cheese, whole milk, cream and eggs in your diet, along with foods made from lard, solid shortenings, and palm and coconut oils.   Avoid trans fat, commonly found in such processed foods as crackers, baked goods and fried items.   Read food labels on margarine and salad dressing so that you can choose those that are lowest in saturated fat and free of trans fat.  Sweets: 5 servings or fewer a week  You don't have to banish sweets entirely while following the DASH diet -- just go easy on them. Examples of one serving include 1 tablespoon sugar, jelly or jam, 1/2 cup sorbet, or 1 cup lemonade.  When you eat sweets, choose those that are fat-free or low-fat, such as sorbets, fruit ices, jelly beans, hard candy, estrellita crackers or low-fat cookies.   Artificial sweeteners such as aspartame (NutraSweet, Equal) and sucralose (Splenda) may help satisfy your sweet tooth while sparing the sugar. But remember that you still must use them sensibly. It's OK to swap a diet cola for a regular cola, but not in place of a more nutritious beverage such as low-fat milk or even plain water.   Cut back on added sugar, which has no nutritional value but can pack on calories.  Drinking too much alcohol can increase blood pressure. The Dietary Guidelines for Americans recommends that men limit alcohol to no more than two drinks a day and women to one or less.  The DASH diet doesn't address caffeine consumption. The influence of caffeine on blood pressure remains unclear. But caffeine can cause your blood pressure to rise at least temporarily. If you already have high blood pressure or if you think caffeine is affecting your blood pressure, talk to your doctor about your caffeine consumption.  While the DASH diet is not a weight-loss  "program, you may indeed lose unwanted pounds because it can help guide you toward healthier food choices.  The DASH diet generally includes about 2,000 calories a day. If you're trying to lose weight, you may need to eat fewer calories. You may also need to adjust your serving goals based on your individual circumstances -- something your health care team can help you decide.  The foods at the core of the DASH diet are naturally low in sodium. So just by following the DASH diet, you're likely to reduce your sodium intake. You also reduce sodium further by:  Using sodium-free spices or flavorings with your food instead of salt   Not adding salt when cooking rice, pasta or hot cereal   Rinsing canned foods to remove some of the sodium   Buying foods labeled "no salt added," "sodium-free," "low sodium" or "very low sodium"  One teaspoon of table salt has 2,325 mg of sodium. When you read food labels, you may be surprised at just how much sodium some processed foods contain. Even low-fat soups, canned vegetables, ready-to-eat cereals and sliced turkey from the local deli -- foods you may have considered healthy -- often have lots of sodium.  You may notice a difference in taste when you choose low-sodium food and beverages. If things seem too bland, gradually introduce low-sodium foods and cut back on table salt until you reach your sodium goal. That'll give your palate time to adjust.  Using salt-free seasoning blends or herbs and spices may also ease the transition. It can take several weeks for your taste buds to get used to less salty foods.  Try these strategies to get started on the DASH diet:   Change gradually. If you now eat only one or two servings of fruits or vegetables a day, try to add a serving at lunch and one at dinner. Rather than switching to all whole grains, start by making one or two of your grain servings whole grains. Increasing fruits, vegetables and whole grains gradually can also help prevent " "bloating or diarrhea that may occur if you aren't used to eating a diet with lots of fiber. You can also try over-the-counter products to help reduce gas from beans and vegetables.   Reward successes and forgive slip-ups. Reward yourself with a nonfood treat for your accomplishments -- rent a movie, purchase a book or get together with a friend. Everyone slips, especially when learning something new. Remember that changing your lifestyle is a long-term process. Find out what triggered your setback and then just  where you left off with the DASH diet.   Add physical activity. To boost your blood pressure lowering efforts even more, consider increasing your physical activity in addition to following the DASH diet. Combining both the DASH diet and physical activity makes it more likely that you'll reduce your blood pressure.   Get support if you need it. If you're having trouble sticking to your diet, talk to your doctor or dietitian about it. You might get some tips that will help you stick to the DASH diet.  Remember, healthy eating isn't an all-or-nothing proposition. What's most important is that, on average, you eat healthier foods with plenty of variety -- both to keep your diet nutritious and to avoid boredom or extremes. And with the DASH diet, you can have both. Vaccines for Adults   The Basics   Written by the doctors and editors at [facility]   What are vaccines? -- Vaccines can prevent certain serious or deadly infections. They are a way of teaching your body how to fight the germs that cause infections. Thanks to vaccines, many fewer people get seriously ill or die from infections than in the past.  Vaccines usually come in shots, but some come in nose sprays or medicines you swallow. When a person gets a vaccine, this is called "vaccination" or "immunization."  Why should I get vaccinated? -- Getting vaccinated can help keep you from getting certain infections. If you do get an infection, being " "vaccinated can also keep you from getting severely ill.  In some cases, being vaccinated also helps protect other people around you. For diseases that can spread from person to person, the goal of vaccines is to get to "herd immunity." Herd immunity is when enough people are immune to a disease that it can no longer spread easily. To get to herd immunity, lots of people need to get vaccinated. This helps protect people who cannot get vaccinated for some reason.  Which vaccines should I get? -- Your doctor or nurse will tell you which vaccines you should get.  There are some vaccines that all adults should get, even if they got their childhood vaccines. These vaccines protect against the following infections:  Coronavirus disease 2019 (COVID-19) - This is an infection caused by a virus called SARS-CoV-2. It can cause a fever, cough, and trouble breathing, along with other symptoms. Some people get severely ill from COVID-19.  Influenza (flu) - The flu can cause fever, chills, muscle aches, cough, and sore throat. It can even cause a lung infection.  Pertussis - This infection is also known as "whooping cough" and can cause a severe breathing illness in babies. It can also make older children and adults sick. Vaccinating adults helps prevent babies around them from getting the infection. The pertussis vaccine comes in the same shot as the diphtheria and tetanus vaccines.  Diphtheria and tetanus - Vaccines against these 2 diseases are usually together in 1 shot, or in a shot with the pertussis vaccine. Diphtheria can cause a thick covering in the back of the throat that can lead to breathing problems. Tetanus causes the muscles to work abnormally.   Some adults will need other vaccines, depending on their age, medical conditions, jobs, travel plans, and other factors. These can include vaccines to protect against:  Pneumococcus - Pneumococcus is a germ that can cause an infection of the lungs, ears, blood, or tissues " "around the brain.  Meningococcus - Meningococcus is a germ that can cause an infection of the blood or tissues around the brain.  Herpes zoster, also called "shingles" - Shingles can cause a painful skin rash and blisters.  Human papillomavirus (HPV) - HPV infection can lead to cancer of the cervix (in women). It can also cause genital warts in men and women. Young adults, especially women, should get this vaccine.  Other infections - These include measles, chickenpox, hepatitis B, and hepatitis A.  How many vaccine doses do I need? -- Each vaccine is different. Some vaccines work after just 1 dose. Others require 2 or more doses to prevent an infection.  Some vaccines prevent an infection for the rest of your life. Others do not. A "booster" is a vaccine dose that you get after a certain number of years. It reminds the body how to prevent an infection. People who got childhood vaccines sometimes need booster doses in adulthood. People who travel to other countries also sometimes need booster doses of certain vaccines.  When should I be vaccinated? -- Different vaccines are given at different ages. Your doctor or nurse will recommend a vaccine schedule that is right for you. For most vaccines, it takes a couple of weeks before you are fully protected. This is because it takes time for your body to prepare to fight the infection.  Do vaccines cause side effects? -- They can. Often vaccines cause no side effects, but sometimes they do. When side effects happen, they can include:  Redness, mild swelling, or soreness where you got the shot  A mild fever  A mild rash  Headache or body aches  These side effects do not mean you are sick, just that your immune system (infection-fighting system) is responding to the vaccine.  Vaccines also sometimes cause more serious side effects, such as severe allergic reactions. But serious side effects are rare.  Ask your doctor or nurse what side effects to expect each time you get a " vaccine. If you have a reaction or a problem after a vaccine, let them know.  What if I am pregnant or want to get pregnant? -- If you want to get pregnant, ask your doctor or nurse if you need any vaccines. Some vaccines must be given before pregnancy. Others are important to get during pregnancy. Getting the right vaccines before and during pregnancy can protect you and your baby from serious infections.  All topics are updated as new evidence becomes available and our peer review process is complete.  This topic retrieved from [employer] on: Sep 21, 2021.  Topic 89902 Version 15.0  Release: 29.4.2 - C29.263  © 2021 UpToDate, Inc. and/or its affiliates. All rights reserved.  Consumer Information Use and Disclaimer   This information is not specific medical advice and does not replace information you receive from your health care provider. This is only a brief summary of general information. It does NOT include all information about conditions, illnesses, injuries, tests, procedures, treatments, therapies, discharge instructions or life-style choices that may apply to you. You must talk with your health care provider for complete information about your health and treatment options. This information should not be used to decide whether or not to accept your health care provider's advice, instructions or recommendations. Only your health care provider has the knowledge and training to provide advice that is right for you. The use of this information is governed by the [employer] End User License Agreement, available at [email or web address]://www.ihiji.Eqvilibria/en/solutions/Bancha/[email or web address]/kari.The use of [employer] content is governed by the [employer] Terms of Use. ©2021 UpToDate, Inc. All rights reserved.  Copyright   © 2021 UpToDate, Inc. and/or its affiliates. All rights reserved.

## 2024-08-31 DIAGNOSIS — R74.01 ELEVATED TRANSAMINASE LEVEL: ICD-10-CM

## 2024-08-31 DIAGNOSIS — E11.59 CONTROLLED TYPE 2 DIABETES MELLITUS WITH OTHER CIRCULATORY COMPLICATION, WITHOUT LONG-TERM CURRENT USE OF INSULIN: ICD-10-CM

## 2024-08-31 DIAGNOSIS — E78.5 HYPERLIPIDEMIA ASSOCIATED WITH TYPE 2 DIABETES MELLITUS: ICD-10-CM

## 2024-08-31 DIAGNOSIS — I48.20 CHRONIC ATRIAL FIBRILLATION: ICD-10-CM

## 2024-08-31 DIAGNOSIS — E11.69 HYPERLIPIDEMIA ASSOCIATED WITH TYPE 2 DIABETES MELLITUS: ICD-10-CM

## 2024-08-31 NOTE — TELEPHONE ENCOUNTER
No care due was identified.  [facility] Embedded Care Due Messages. Reference number: [id number].   8/31/2024 8:03:11 AM CDT

## 2024-09-01 RX ORDER — PRAVASTATIN SODIUM 40 MG/1
TABLET ORAL
Qty: 90 TABLET | Refills: 3 | Status: SHIPPED | OUTPATIENT
Start: 2024-09-01

## 2024-09-01 RX ORDER — APIXABAN 5 MG/1
TABLET, FILM COATED ORAL
Qty: 180 TABLET | Refills: 3 | Status: SHIPPED | OUTPATIENT
Start: 2024-09-01

## 2024-09-01 RX ORDER — ACARBOSE 25 MG/1
25 TABLET ORAL 2 TIMES DAILY WITH MEALS
Qty: 180 TABLET | Refills: 3 | Status: SHIPPED | OUTPATIENT
Start: 2024-09-01

## 2024-09-01 NOTE — TELEPHONE ENCOUNTER
Refill Routing Note   Medication(s) are not appropriate for processing by [facility] for the following reason(s):        Outside of protocol    [facility] action(s):  Route  Approve             Appointments  past 12m or future 3m with PCP    Date Provider   Last Visit   8/15/2024 Isiah Crespo MD   Next Visit   12/17/2024 Isiah Crespo MD   ED visits in past 90 days: 0        Note composed:2:33 AM 09/01/2024

## 2024-09-02 NOTE — PROGRESS NOTES
Encounter Diagnoses   Name Primary?    Type 2 diabetes mellitus with stage 3a chronic kidney disease, without long-term current use of insulin Yes    Stage 3a chronic kidney disease      Subjective:   Domitila Walters is a 84 y.o. male with hypertension, controlled today.Patient denies any exertional chest pain, dyspnea, palpitations, syncope, orthopnea, edema or paroxysmal nocturnal dyspnea.  Diabetic Review of Systems - medication compliance: compliant all of the time, diabetic diet compliance: compliant most of the time, home glucose monitoring: is performed regularly, values are usually normal, further diabetic ROS: no polyuria or polydipsia, no chest pain, dyspnea or TIA's, no unusual visual symptoms, no hypoglycemia, no medication side effects noted, has dysesthesias in the feet, last eye exam approximately less than 1 year ago..  Lab Results   Component Value Date    HGBA1C 6.0 (H) 08/08/2024         Current Outpatient Medications   Medication Sig Dispense Refill    beta-carotene,A,-vits C,E/mins (OCUVITE ORAL) Take by mouth.      cholecalciferol, vitamin D3, 1,000 unit capsule Take 1 capsule (1,000 Units total) by mouth once daily. 90 capsule 11    diltiaZEM (CARDIZEM CD) 300 MG 24 hr capsule TAKE ONE CAPSULE BY MOUTH EVERY DAY 90 capsule 3    FOLBIC 2.5-25-2 mg Tab TAKE ONE TABLET BY MOUTH DAILY 90 tablet 3    omeprazole (PRILOSEC) 20 MG capsule TAKE ONE CAPSULE BY MOUTH ONCE DAILY 90 capsule 3    acarbose (PRECOSE) 25 MG Tab TAKE ONE TABLET BY MOUTH TWICE DAILY WITH MEALS 180 tablet 3    blood sugar diagnostic Strp 1 strip by Misc.(Non-Drug; Combo Route) route 2 (two) times daily with meals. 100 strip 11    blood-glucose meter Misc 1 Device by Misc.(Non-Drug; Combo Route) route 2 (two) times daily with meals. 1 each 0    calcium-vitamin D3 (OS-BROOKLYNN 500 + D3) 500 mg-5 mcg (200 unit) per tablet Take 1 tablet by mouth 2 (two) times daily with meals.      ELIQUIS 5 mg Tab TAKE ONE TABLET BY MOUTH TWICE DAILY 180  "tablet 3    lancets Misc 1 each by Misc.(Non-Drug; Combo Route) route 2 (two) times daily with meals. 100 each 11    lancing device with lancets Kit 1 Device by Misc.(Non-Drug; Combo Route) route 2 (two) times daily with meals. 1 each 0    pravastatin (PRAVACHOL) 40 MG tablet TAKE ONE TABLET BY MOUTH ONCE DAILY 90 tablet 3     No current facility-administered medications for this visit.      Hypertension ROS: taking medications as instructed, no medication side effects noted, home BP monitoring in range of 120's systolic over 80's diastolic, no TIA's, possible neurological symptoms PAD, no chest pain on exertion, no dyspnea on exertion, no swelling of ankles, no orthostatic dizziness or lightheadedness, and no orthopnea or paroxysmal nocturnal dyspnea.   New concerns: arthritis.     Objective:   /84 (BP Location: Left arm, Patient Position: Sitting, BP Method: Medium (Manual))   Pulse 62   Temp 97.8 °F (36.6 °C) (Oral)   Ht 6' 1" (1.854 m)   Wt 93.6 kg (206 lb 5.6 oz)   SpO2 97%   BMI 27.22 kg/m²    Appearance alert, well appearing, and in no distress, oriented to person, place, and time, normal appearing weight, acyanotic, in no respiratory distress, improved, and well hydrated.  General exam BP noted to be mildly elevated today in office, BP noted to be mildly elevated today in office though normal at other visits, S1, S2 normal, no gallop, no murmur, chest clear, no JVD, no HSM, no edema.   Lab review: labs are reviewed, up to date and normal, labs reviewed,, orders written for new lab studies as appropriate; see orders.   Lab Results   Component Value Date    WBC 7.74 03/23/2022    HGB 15.2 03/23/2022    HCT 46.9 03/23/2022     03/23/2022    CHOL 155 08/08/2024    TRIG 96 08/08/2024    HDL 48 08/08/2024    ALT 19 08/08/2024    AST 17 08/08/2024     08/08/2024    K 4.4 08/08/2024     08/08/2024    CREATININE 1.3 08/08/2024    BUN 15 08/08/2024    CO2 22 (L) 08/08/2024    TSH 1.462 " 05/31/2018    PSA 2.2 01/29/2016    INR 1.0 09/28/2017    HGBA1C 6.0 (H) 08/08/2024       Assessment:    Hypertension well controlled, loss of control due to intercurrent illness, and needs to follow diet more regularly.   Type 2 diabetes mellitus with stage 3a chronic kidney disease, without long-term current use of insulin  -     Lipid Panel; Future; Expected date: 08/15/2024  -     Comprehensive Metabolic Panel; Future; Expected date: 08/15/2024  -     CBC Auto Differential; Future; Expected date: 08/15/2024  -     HEMOGLOBIN A1C; Future; Expected date: 08/15/2024  -     MICROALBUMIN / CREATININE RATIO URINE    Stage 3a chronic kidney disease    I counseled the patient on HTN education, management and recommendations.  I recommended weight loss toward a BMI < 25, avoidance of salt and the DASH diet, regular cardio exercise a minimum of 150 minutes per week and medications if indicated.  Printed materials were given. The goal is < 140/90 unless otherwise specified.    I have recommended that this patient have a immunization for Covid, RSV, and Shingles but he declines at this time. I have discussed the risks and benefits of this procedure with him. The patient verbalizes understanding.  Advance Care Planning     Date: 09/02/2024    Living Will  During this visit, I engaged the patient  in the voluntary advance care planning process.  The patient and I reviewed the role for advance directives and their purpose in directing future healthcare if the patient's unable to speak for him/herself.  At this point in time, the patient does have full decision-making capacity.  We discussed different extreme health states that he could experience, and reviewed what kind of medical care he would want in those situations.  The patient communicated that if he were comatose and had little chance of a meaningful recovery, he would not want machines/life-sustaining treatments used. In addition to the above preference, other  important end-of-life issues for the patient include  comfort independent . The patient has completed a living will to reflect these preferences.  I spent a total of 15 minutes engaging the patient in this advance care planning discussion.             Plan:   Current treatment plan is effective, no change in therapy.  Lab results and schedule of future lab studies reviewed with patient.  Repeat labs ordered prior to next appointment.  Reviewed diet, exercise and weight control.  Cardiovascular risk and specific lipid/LDL goals reviewed.  For claudication, exercise to point of pain, rest, then continue.  Follow up: 4 months and as needed..  Discussed health maintenance guidelines appropriate for age.  Discussed health maintenance guidelines appropriate for age.

## 2024-10-29 DIAGNOSIS — Z00.00 ENCOUNTER FOR MEDICARE ANNUAL WELLNESS EXAM: ICD-10-CM

## 2024-11-30 ENCOUNTER — HOSPITAL ENCOUNTER (OUTPATIENT)
Facility: HOSPITAL | Age: 84
Discharge: HOME | End: 2024-12-02
Attending: EMERGENCY MEDICINE | Admitting: INTERNAL MEDICINE
Payer: MEDICARE

## 2024-11-30 DIAGNOSIS — N18.31 TYPE 2 DIABETES MELLITUS WITH STAGE 3A CHRONIC KIDNEY DISEASE, WITHOUT LONG-TERM CURRENT USE OF INSULIN: ICD-10-CM

## 2024-11-30 DIAGNOSIS — E11.22 TYPE 2 DIABETES MELLITUS WITH STAGE 3A CHRONIC KIDNEY DISEASE, WITHOUT LONG-TERM CURRENT USE OF INSULIN: ICD-10-CM

## 2024-11-30 DIAGNOSIS — S22.079A CLOSED FRACTURE OF NINTH THORACIC VERTEBRA, UNSPECIFIED FRACTURE MORPHOLOGY, INITIAL ENCOUNTER: ICD-10-CM

## 2024-11-30 DIAGNOSIS — M48.10 DISH (DIFFUSE IDIOPATHIC SKELETAL HYPEROSTOSIS): ICD-10-CM

## 2024-11-30 DIAGNOSIS — R07.9 CHEST PAIN: ICD-10-CM
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
## 2024-11-30 DIAGNOSIS — S22.078A OTHER CLOSED FRACTURE OF NINTH THORACIC VERTEBRA, INITIAL ENCOUNTER: Primary | ICD-10-CM

## 2024-11-30 LAB
ALBUMIN SERPL BCP-MCNC: 4 G/DL (ref 3.5–5.2)
ALP SERPL-CCNC: 56 U/L (ref 55–135)
ALT SERPL W/O P-5'-P-CCNC: 16 U/L (ref 10–44)
ANION GAP SERPL CALC-SCNC: 7 MMOL/L (ref 8–16)
AST SERPL-CCNC: 14 U/L (ref 10–40)
BASOPHILS # BLD AUTO: 0.05 K/UL (ref 0–0.2)
BASOPHILS NFR BLD: 0.5 % (ref 0–1.9)
BILIRUB SERPL-MCNC: 0.4 MG/DL (ref 0.1–1)
BUN SERPL-MCNC: 16 MG/DL (ref 8–23)
CALCIUM SERPL-MCNC: 8.5 MG/DL (ref 8.7–10.5)
CHLORIDE SERPL-SCNC: 105 MMOL/L (ref 95–110)
CO2 SERPL-SCNC: 27 MMOL/L (ref 23–29)
CREAT SERPL-MCNC: 1.1 MG/DL (ref 0.5–1.4)
DIFFERENTIAL METHOD BLD: ABNORMAL
EOSINOPHIL # BLD AUTO: 0.7 K/UL (ref 0–0.5)
EOSINOPHIL NFR BLD: 6.1 % (ref 0–8)
ERYTHROCYTE [DISTWIDTH] IN BLOOD BY AUTOMATED COUNT: 13.2 % (ref 11.5–14.5)
EST. GFR  (NO RACE VARIABLE): >60 ML/MIN/1.73 M^2
GLUCOSE SERPL-MCNC: 172 MG/DL (ref 70–110)
HCT VFR BLD AUTO: 41.6 % (ref 40–54)
HGB BLD-MCNC: 14.1 G/DL (ref 14–18)
IMM GRANULOCYTES # BLD AUTO: 0.03 K/UL (ref 0–0.04)
IMM GRANULOCYTES NFR BLD AUTO: 0.3 % (ref 0–0.5)
INR PPP: 1 (ref 0.8–1.2)
LYMPHOCYTES # BLD AUTO: 2 K/UL (ref 1–4.8)
LYMPHOCYTES NFR BLD: 18.6 % (ref 18–48)
MCH RBC QN AUTO: 32.7 PG (ref 27–31)
MCHC RBC AUTO-ENTMCNC: 33.9 G/DL (ref 32–36)
MCV RBC AUTO: 97 FL (ref 82–98)
MONOCYTES # BLD AUTO: 0.9 K/UL (ref 0.3–1)
MONOCYTES NFR BLD: 7.9 % (ref 4–15)
NEUTROPHILS # BLD AUTO: 7.3 K/UL (ref 1.8–7.7)
NEUTROPHILS NFR BLD: 66.6 % (ref 38–73)
NRBC BLD-RTO: 0 /100 WBC
PLATELET # BLD AUTO: 184 K/UL (ref 150–450)
PMV BLD AUTO: 10 FL (ref 9.2–12.9)
POTASSIUM SERPL-SCNC: 4 MMOL/L (ref 3.5–5.1)
PROT SERPL-MCNC: 6.3 G/DL (ref 6–8.4)
PROTHROMBIN TIME: 11.3 SEC (ref 9–12.5)
RBC # BLD AUTO: 4.31 M/UL (ref 4.6–6.2)
SODIUM SERPL-SCNC: 139 MMOL/L (ref 136–145)
WBC # BLD AUTO: 10.89 K/UL (ref 3.9–12.7)

## 2024-11-30 PROCEDURE — 96374 THER/PROPH/DIAG INJ IV PUSH: CPT

## 2024-11-30 PROCEDURE — 86803 HEPATITIS C AB TEST: CPT | Performed by: EMERGENCY MEDICINE

## 2024-11-30 PROCEDURE — 85025 COMPLETE CBC W/AUTO DIFF WBC: CPT | Performed by: EMERGENCY MEDICINE

## 2024-11-30 PROCEDURE — 87389 HIV-1 AG W/HIV-1&-2 AB AG IA: CPT | Performed by: EMERGENCY MEDICINE

## 2024-11-30 PROCEDURE — 99285 EMERGENCY DEPT VISIT HI MDM: CPT | Mod: 25

## 2024-11-30 PROCEDURE — 85610 PROTHROMBIN TIME: CPT | Performed by: EMERGENCY MEDICINE

## 2024-11-30 PROCEDURE — 96372 THER/PROPH/DIAG INJ SC/IM: CPT | Performed by: EMERGENCY MEDICINE

## 2024-11-30 PROCEDURE — 96375 TX/PRO/DX INJ NEW DRUG ADDON: CPT

## 2024-11-30 PROCEDURE — 63600175 PHARM REV CODE 636 W HCPCS: Performed by: EMERGENCY MEDICINE

## 2024-11-30 PROCEDURE — 25000003 PHARM REV CODE 250: Performed by: EMERGENCY MEDICINE

## 2024-11-30 PROCEDURE — 80053 COMPREHEN METABOLIC PANEL: CPT | Performed by: EMERGENCY MEDICINE

## 2024-11-30 RX ORDER — ONDANSETRON HYDROCHLORIDE 2 MG/ML
4 INJECTION, SOLUTION INTRAVENOUS
Status: COMPLETED | OUTPATIENT
Start: 2024-11-30 | End: 2024-11-30

## 2024-11-30 RX ORDER — METHOCARBAMOL 500 MG/1
1000 TABLET, FILM COATED ORAL
Status: COMPLETED | OUTPATIENT
Start: 2024-11-30 | End: 2024-11-30

## 2024-11-30 RX ORDER — OXYCODONE AND ACETAMINOPHEN 5; 325 MG/1; MG/1
1 TABLET ORAL
Status: COMPLETED | OUTPATIENT
Start: 2024-11-30 | End: 2024-11-30

## 2024-11-30 RX ORDER — MORPHINE SULFATE 4 MG/ML
4 INJECTION, SOLUTION INTRAMUSCULAR; INTRAVENOUS ONCE
Status: COMPLETED | OUTPATIENT
Start: 2024-11-30 | End: 2024-11-30

## 2024-11-30 RX ORDER — MORPHINE SULFATE 4 MG/ML
4 INJECTION, SOLUTION INTRAMUSCULAR; INTRAVENOUS
Status: COMPLETED | OUTPATIENT
Start: 2024-11-30 | End: 2024-11-30

## 2024-11-30 RX ADMIN — METHOCARBAMOL 1000 MG: 500 TABLET ORAL at 10:11

## 2024-11-30 RX ADMIN — MORPHINE SULFATE 4 MG: 4 INJECTION, SOLUTION INTRAMUSCULAR; INTRAVENOUS at 10:11

## 2024-11-30 RX ADMIN — MORPHINE SULFATE 4 MG: 4 INJECTION, SOLUTION INTRAMUSCULAR; INTRAVENOUS at 11:11

## 2024-11-30 RX ADMIN — OXYCODONE HYDROCHLORIDE AND ACETAMINOPHEN 1 TABLET: 5; 325 TABLET ORAL at 08:11

## 2024-11-30 RX ADMIN — ONDANSETRON 4 MG: 2 INJECTION INTRAMUSCULAR; INTRAVENOUS at 11:11

## 2024-12-01 PROBLEM — I48.91 A-FIB: Status: ACTIVE | Noted: 2024-12-01

## 2024-12-01 PROBLEM — S22.079A CLOSED FRACTURE OF NINTH THORACIC VERTEBRA: Status: ACTIVE | Noted: 2024-12-01

## 2024-12-01 LAB
ALBUMIN SERPL BCP-MCNC: 3.7 G/DL (ref 3.5–5.2)
ALP SERPL-CCNC: 52 U/L (ref 55–135)
ALT SERPL W/O P-5'-P-CCNC: 13 U/L (ref 10–44)
ANION GAP SERPL CALC-SCNC: 6 MMOL/L (ref 8–16)
AST SERPL-CCNC: 13 U/L (ref 10–40)
BASOPHILS # BLD AUTO: 0.03 K/UL (ref 0–0.2)
BASOPHILS NFR BLD: 0.4 % (ref 0–1.9)
BILIRUB SERPL-MCNC: 0.4 MG/DL (ref 0.1–1)
BUN SERPL-MCNC: 16 MG/DL (ref 8–23)
CALCIUM SERPL-MCNC: 8.1 MG/DL (ref 8.7–10.5)
CHLORIDE SERPL-SCNC: 107 MMOL/L (ref 95–110)
CO2 SERPL-SCNC: 27 MMOL/L (ref 23–29)
CREAT SERPL-MCNC: 1.2 MG/DL (ref 0.5–1.4)
DIFFERENTIAL METHOD BLD: ABNORMAL
EOSINOPHIL # BLD AUTO: 0.7 K/UL (ref 0–0.5)
EOSINOPHIL NFR BLD: 8.9 % (ref 0–8)
ERYTHROCYTE [DISTWIDTH] IN BLOOD BY AUTOMATED COUNT: 13.4 % (ref 11.5–14.5)
EST. GFR  (NO RACE VARIABLE): 58.9 ML/MIN/1.73 M^2
ESTIMATED AVG GLUCOSE: 143 MG/DL (ref 68–131)
GLUCOSE SERPL-MCNC: 141 MG/DL (ref 70–110)
HBA1C MFR BLD: 6.6 % (ref 4.5–6.2)
HCT VFR BLD AUTO: 40.8 % (ref 40–54)
HCV AB SERPL QL IA: NEGATIVE
HGB BLD-MCNC: 13.3 G/DL (ref 14–18)
HIV 1+2 AB+HIV1 P24 AG SERPL QL IA: NEGATIVE
IMM GRANULOCYTES # BLD AUTO: 0.02 K/UL (ref 0–0.04)
IMM GRANULOCYTES NFR BLD AUTO: 0.3 % (ref 0–0.5)
LYMPHOCYTES # BLD AUTO: 2.2 K/UL (ref 1–4.8)
LYMPHOCYTES NFR BLD: 28.1 % (ref 18–48)
MAGNESIUM SERPL-MCNC: 1.8 MG/DL (ref 1.6–2.6)
MCH RBC QN AUTO: 32.2 PG (ref 27–31)
MCHC RBC AUTO-ENTMCNC: 32.6 G/DL (ref 32–36)
MCV RBC AUTO: 99 FL (ref 82–98)
MONOCYTES # BLD AUTO: 0.8 K/UL (ref 0.3–1)
MONOCYTES NFR BLD: 10.2 % (ref 4–15)
NEUTROPHILS # BLD AUTO: 4 K/UL (ref 1.8–7.7)
NEUTROPHILS NFR BLD: 52.1 % (ref 38–73)
NRBC BLD-RTO: 0 /100 WBC
PLATELET # BLD AUTO: 176 K/UL (ref 150–450)
PMV BLD AUTO: 10.4 FL (ref 9.2–12.9)
POCT GLUCOSE: 107 MG/DL (ref 70–110)
POTASSIUM SERPL-SCNC: 4.3 MMOL/L (ref 3.5–5.1)
PROT SERPL-MCNC: 5.8 G/DL (ref 6–8.4)
RBC # BLD AUTO: 4.13 M/UL (ref 4.6–6.2)
SODIUM SERPL-SCNC: 140 MMOL/L (ref 136–145)
WBC # BLD AUTO: 7.64 K/UL (ref 3.9–12.7)

## 2024-12-01 PROCEDURE — 96376 TX/PRO/DX INJ SAME DRUG ADON: CPT

## 2024-12-01 PROCEDURE — G0378 HOSPITAL OBSERVATION PER HR: HCPCS

## 2024-12-01 PROCEDURE — 80053 COMPREHEN METABOLIC PANEL: CPT | Performed by: INTERNAL MEDICINE

## 2024-12-01 PROCEDURE — 25000003 PHARM REV CODE 250: Performed by: INTERNAL MEDICINE

## 2024-12-01 PROCEDURE — 36415 COLL VENOUS BLD VENIPUNCTURE: CPT | Performed by: INTERNAL MEDICINE

## 2024-12-01 PROCEDURE — 96375 TX/PRO/DX INJ NEW DRUG ADDON: CPT

## 2024-12-01 PROCEDURE — 83735 ASSAY OF MAGNESIUM: CPT | Performed by: INTERNAL MEDICINE

## 2024-12-01 PROCEDURE — 83036 HEMOGLOBIN GLYCOSYLATED A1C: CPT | Performed by: INTERNAL MEDICINE

## 2024-12-01 PROCEDURE — 85025 COMPLETE CBC W/AUTO DIFF WBC: CPT | Performed by: INTERNAL MEDICINE

## 2024-12-01 RX ORDER — GLUCAGON 1 MG
1 KIT INJECTION
Status: DISCONTINUED | OUTPATIENT
Start: 2024-12-01 | End: 2024-12-02 | Stop reason: HOSPADM

## 2024-12-01 RX ORDER — INSULIN ASPART 100 [IU]/ML
0-5 INJECTION, SOLUTION INTRAVENOUS; SUBCUTANEOUS
Status: DISCONTINUED | OUTPATIENT
Start: 2024-12-01 | End: 2024-12-02 | Stop reason: HOSPADM

## 2024-12-01 RX ORDER — SODIUM,POTASSIUM PHOSPHATES 280-250MG
2 POWDER IN PACKET (EA) ORAL
Status: DISCONTINUED | OUTPATIENT
Start: 2024-12-01 | End: 2024-12-02 | Stop reason: HOSPADM

## 2024-12-01 RX ORDER — AMOXICILLIN 250 MG
2 CAPSULE ORAL 2 TIMES DAILY
Status: DISCONTINUED | OUTPATIENT
Start: 2024-12-01 | End: 2024-12-02 | Stop reason: HOSPADM

## 2024-12-01 RX ORDER — HYDROCODONE BITARTRATE AND ACETAMINOPHEN 5; 325 MG/1; MG/1
2 TABLET ORAL EVERY 6 HOURS PRN
Status: DISCONTINUED | OUTPATIENT
Start: 2024-12-01 | End: 2024-12-02 | Stop reason: HOSPADM

## 2024-12-01 RX ORDER — LANOLIN ALCOHOL/MO/W.PET/CERES
800 CREAM (GRAM) TOPICAL
Status: DISCONTINUED | OUTPATIENT
Start: 2024-12-01 | End: 2024-12-02 | Stop reason: HOSPADM

## 2024-12-01 RX ORDER — ACETAMINOPHEN 325 MG/1
650 TABLET ORAL EVERY 4 HOURS PRN
Status: DISCONTINUED | OUTPATIENT
Start: 2024-12-01 | End: 2024-12-02 | Stop reason: HOSPADM

## 2024-12-01 RX ORDER — NALOXONE HCL 0.4 MG/ML
0.02 VIAL (ML) INJECTION
Status: DISCONTINUED | OUTPATIENT
Start: 2024-12-01 | End: 2024-12-02 | Stop reason: HOSPADM

## 2024-12-01 RX ORDER — IBUPROFEN 200 MG
16 TABLET ORAL
Status: DISCONTINUED | OUTPATIENT
Start: 2024-12-01 | End: 2024-12-02 | Stop reason: HOSPADM

## 2024-12-01 RX ORDER — ONDANSETRON HYDROCHLORIDE 2 MG/ML
4 INJECTION, SOLUTION INTRAVENOUS EVERY 6 HOURS PRN
Status: DISCONTINUED | OUTPATIENT
Start: 2024-12-01 | End: 2024-12-02 | Stop reason: HOSPADM

## 2024-12-01 RX ORDER — FAMOTIDINE 10 MG/ML
20 INJECTION INTRAVENOUS 2 TIMES DAILY
Status: DISCONTINUED | OUTPATIENT
Start: 2024-12-01 | End: 2024-12-02 | Stop reason: HOSPADM

## 2024-12-01 RX ORDER — SODIUM CHLORIDE 0.9 % (FLUSH) 0.9 %
3 SYRINGE (ML) INJECTION EVERY 12 HOURS PRN
Status: DISCONTINUED | OUTPATIENT
Start: 2024-12-01 | End: 2024-12-02 | Stop reason: HOSPADM

## 2024-12-01 RX ORDER — IBUPROFEN 200 MG
24 TABLET ORAL
Status: DISCONTINUED | OUTPATIENT
Start: 2024-12-01 | End: 2024-12-02 | Stop reason: HOSPADM

## 2024-12-01 RX ORDER — ACETAMINOPHEN 325 MG/1
650 TABLET ORAL EVERY 8 HOURS PRN
Status: DISCONTINUED | OUTPATIENT
Start: 2024-12-01 | End: 2024-12-02 | Stop reason: HOSPADM

## 2024-12-01 RX ORDER — ALUMINUM HYDROXIDE, MAGNESIUM HYDROXIDE, AND SIMETHICONE 1200; 120; 1200 MG/30ML; MG/30ML; MG/30ML
30 SUSPENSION ORAL 4 TIMES DAILY PRN
Status: DISCONTINUED | OUTPATIENT
Start: 2024-12-01 | End: 2024-12-02 | Stop reason: HOSPADM

## 2024-12-01 RX ORDER — HYDROMORPHONE HYDROCHLORIDE 1 MG/ML
0.5 INJECTION, SOLUTION INTRAMUSCULAR; INTRAVENOUS; SUBCUTANEOUS
Status: DISCONTINUED | OUTPATIENT
Start: 2024-12-01 | End: 2024-12-02 | Stop reason: HOSPADM

## 2024-12-01 RX ORDER — TALC
6 POWDER (GRAM) TOPICAL NIGHTLY PRN
Status: DISCONTINUED | OUTPATIENT
Start: 2024-12-01 | End: 2024-12-02 | Stop reason: HOSPADM

## 2024-12-01 RX ADMIN — FAMOTIDINE 20 MG: 10 INJECTION, SOLUTION INTRAVENOUS at 01:12

## 2024-12-01 RX ADMIN — HYDROCODONE BITARTRATE AND ACETAMINOPHEN 2 TABLET: 5; 325 TABLET ORAL at 04:12

## 2024-12-01 RX ADMIN — APIXABAN 5 MG: 5 TABLET, FILM COATED ORAL at 09:12

## 2024-12-01 RX ADMIN — FAMOTIDINE 20 MG: 10 INJECTION, SOLUTION INTRAVENOUS at 09:12

## 2024-12-01 RX ADMIN — SENNOSIDES AND DOCUSATE SODIUM 2 TABLET: 8.6; 5 TABLET ORAL at 09:12

## 2024-12-01 RX ADMIN — FAMOTIDINE 20 MG: 10 INJECTION, SOLUTION INTRAVENOUS at 10:12

## 2024-12-01 NOTE — CONSULTS
Recommend TLSO brace at all times. Please get upright AP/lat Thoracic xray in brace. Will arrange outpatient follow up. No emergent neurosurgical intervention required at this time.
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
## 2024-12-01 NOTE — CARE UPDATE
Patient admitted earlier with T9 fracture. Neurosurgery does not plan surgical intervention. TLSO brace ordered. Patient with some bradycardia today. Will hold cardizem and continue to monitor.
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
## 2024-12-01 NOTE — ASSESSMENT & PLAN NOTE
Neurosurgeon is aware     CT and MRI done      Patient looks good for now laying in bed    I held his eliquis for now     Control pain .   Tylenol , norco , Dilaudid IV PRN       NPO for now until seen by neurosurgeon       PLAN      - npo    - control pain     - hold eliquis    - consult neurosurgery     - IV Pepcid BID

## 2024-12-01 NOTE — PLAN OF CARE
Case Management Note:     Contact [facility] : [phone]  informed of TLSO brace consult. Tech informed will drop off brace.    No other needs indicated

## 2024-12-01 NOTE — PLAN OF CARE
12/01/24 1105   GARCIA Message   Medicare Outpatient and Observation Notification regarding financial responsibility Given to patient/caregiver;Explained to patient/caregiver;Signed/date by patient/caregiver   Date GARCIA was signed 12/01/24   Time GARCIA was signed 1100

## 2024-12-01 NOTE — HPI
" 84  WM iwith PMHx afib on Eliquis, diabetes, GERD, hypertension who presents to the ED after mechanical fall around 645 this evening.  Patient states he tripped over a curb and fell into a tree hitting his mid thoracic and low back.  He states it knocked the breath out of him" initially.  He did not hit his head or lose consciousness.  He was assisted up by bystanders.  He was able to walk on his own.  He initially went home and tried to lay down but the back pain increased prompting ED visit.  He denies any focal weakness or paresthesias.  Denies chest pain, abdominal pain, bowel or bladder dysfunction.  He has urinated and did not notice any gross hematuria.     Non smoke  Non drinker    Lives at home with wife     Previously demonstrated T9 vertebral body fracture disrupting the bridging anterior osteophyte as well as the anterior cortex extending into the superior endplate with slight central height loss.  Mild marrow edema signal is noted within bilateral pedicles which may reflect nondisplaced component extending into the posterior elements.  Additionally, there is focal disruption of the ALL at the level of T9 as well as edema signal with focal short segment discontinuity of the supraspinous ligament at the level of T8-T9.  No obvious disruption of PLL appreciated.  Neurosurgical consultation/evaluation is advised in light of potential instability.     Possible atypical hemangioma involving the right posterolateral aspect of the T8 vertebral body extending into the right pedicle.  There is marked associated STIR hyperintensity/marrow edema associated with the lesion which may reflect a nondisplaced fracture extending through the lesion into the right pedicle, not well appreciated on prior CT examination.     No definite thoracic cord signal abnormality.     Suspected nondisplaced left sacral alar fracture.     Advanced multilevel degenerative changes of the lumbar spine as detailed above.      CT Thoracic " spine in ER     There is diffuse flowing, bridging anterior osteophytosis throughout the thoracic spine in keeping with DISH.  There is an acute minimally displaced obliquely oriented fracture at the level of the T9 vertebral body.  This transverses the fused anterior osteophyte at T9 and anterior cortex extending to the superior endplate.  There is a suspected nondisplaced component extending into the left transverse process of T9 (for example axial series 4, image 96).  Facet alignment appears within normal limits without evidence of perched facet.  No significant abnormal widening of the disc space appreciated.  Evaluation of the epidural space is limited by CT technique.  There is mild associated prevertebral fat stranding/edema.     There is a mild chronic appearing superior endplate deformity of the T3 vertebral body with mild anterior wedging.  The remaining thoracic vertebral body heights appear adequately maintained.  No definite additional acute displaced fractures identified.  There is moderate multilevel degenerative discogenic change throughout the thoracic spine.          In the ER     Labs normal     He was given morphine 4 mg IV twice       They spoke to Neurosurgery who wanted MRI        And they will consult       He was in no distress when I saw him    Breathing fine       Plan is to admit to our service tonight

## 2024-12-01 NOTE — PLAN OF CARE
[facility]  Initial Discharge Assessment       Primary Care Provider: Isiah Crespo MD    Admission Diagnosis: Other closed fracture of ninth thoracic vertebra, initial encounter [S22.078A]    Admission Date: 11/30/2024  Expected Discharge Date: 12/2/2024    Transition of Care Barriers: None    Payor: MEDICARE / Plan: MEDICARE PART A & B / Product Type: Government /     Extended Emergency Contact Information  Primary Emergency Contact: WaltersZuleyka vasquez MARK  Address: [address] [place], [place] [id number] [place] [address]  Home Phone: [phone]  Mobile Phone: [phone]  Relation: Spouse  Preferred language: English   needed? No    Discharge Plan A: Home with family  Discharge Plan B: Home with family      [facility] [place] [place] [facility] [address] [facility] [address]  [place] [address]  Phone: [phone] Fax: [phone]    Spoke with patient at bedside, he explained that he fell while out having lunch with his wife. Prior to falling, patient was completely independent, he now uses a walker at home until he recover from the fall. Patient doesn't use any home services. Patient receives help from his daughters, who will provide transportation home. Currently, patient doesn't have any needs from CM standpoint. CM will continue to follow.     Initial Assessment (most recent)       Adult Discharge Assessment - 12/01/24 1106          Discharge Assessment    Assessment Type Discharge Planning Assessment     Confirmed/corrected address, phone number and insurance Yes     Confirmed Demographics Correct on Facesheet     Source of Information patient     Communicated MARIAH with patient/caregiver Yes     People in Home spouse;child(cori), adult     Do you expect to return to your current living situation? Yes     Do you have help at home or someone to help you manage your care at home? Yes     Who are your caregiver(s) and their phone number(s)? Daughter and  spouse     Current cognitive status: Alert/Oriented     Walking or Climbing Stairs Difficulty yes   due to recent fall    Dressing/Bathing Difficulty yes   due to recent fall    Dressing/Bathing bathing difficulty, assistance 1 person     Equipment Currently Used at Home walker, standard     Readmission within 30 days? No     Patient currently being followed by outpatient case management? No     Do you currently have service(s) that help you manage your care at home? No     Do you take prescription medications? Yes     Do you have prescription coverage? Yes     Do you have any problems affording any of your prescribed medications? No     Is the patient taking medications as prescribed? yes     How do you get to doctors appointments? car, drives self;family or friend will provide     Are you on dialysis? No     Do you take coumadin? No     Discharge Plan A Home with family     Discharge Plan B Home with family     DME Needed Upon Discharge  none     Discharge Plan discussed with: Patient     Transition of Care Barriers None        Physical Activity    On average, how many days per week do you engage in moderate to strenuous exercise (like a brisk walk)? 7 days     On average, how many minutes do you engage in exercise at this level? 20 min        Financial Resource Strain    How hard is it for you to pay for the very basics like food, housing, medical care, and heating? Not hard at all        Housing Stability    In the last 12 months, was there a time when you were not able to pay the mortgage or rent on time? No     At any time in the past 12 months, were you homeless or living in a shelter (including now)? No        Transportation Needs    Has the lack of transportation kept you from medical appointments, meetings, work or from getting things needed for daily living? No        Food Insecurity    Within the past 12 months, you worried that your food would run out before you got the money to buy more. Never true      Within the past 12 months, the food you bought just didn't last and you didn't have money to get more. Never true        Stress    Do you feel stress - tense, restless, nervous, or anxious, or unable to sleep at night because your mind is troubled all the time - these days? Not at all        Social Isolation    How often do you feel lonely or isolated from those around you?  Never        Alcohol Use    Q1: How often do you have a drink containing alcohol? Never     Q2: How many drinks containing alcohol do you have on a typical day when you are drinking? Patient does not drink     Q3: How often do you have six or more drinks on one occasion? Never        Utilities    In the past 12 months has the electric, gas, oil, or water company threatened to shut off services in your home? No        Health Literacy    How often do you need to have someone help you when you read instructions, pamphlets, or other written material from your doctor or pharmacy? Sometimes

## 2024-12-01 NOTE — ED PROVIDER NOTES
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 nondisplaced component extending into the left transverse process of T9 (for example axial series 4, image 96).  Facet alignment appears within normal limits without evidence of perched facet.  No significant abnormal widening of the disc space appreciated.  Evaluation of the epidural space is limited by CT technique.  There is mild associated prevertebral fat stranding/edema.    There is a mild chronic appearing superior endplate deformity of the T3 vertebral body with mild anterior wedging.  The remaining thoracic vertebral body heights appear adequately maintained.  No definite additional acute displaced fractures identified.  There is moderate multilevel degenerative discogenic change throughout the thoracic spine.    Intrathoracic structures demonstrate calcified mediastinal and left hilar lymph nodes.  Trachea is midline and patent.  The lungs demonstrate no confluent airspace consolidation or pneumothorax.  Bilateral dependent opacities are favored to reflect atelectasis.  No significant pleural fluid.    LUMBAR SPINE:    There is mild chronic appearing height loss of the L3 vertebral body.  The remaining lumbar vertebral body heights appear adequately maintained.  No definite evidence to suggest acute compression fracture deformity of the lumbar spine.  There is prominent inferior endplate Schmorl node  formation involving the L1 vertebral body and to a lesser degree of the L3-L4 disc space.  There is minimal retrolisthesis of L2 on L3 and L3 on L4.  There are degenerative changes as below:    T12-L1: Mild circumferential disc bulge and bilateral facet arthropathy.  No significant spinal canal stenosis.  Mild bilateral neural foraminal narrowing.    L1-L2: Circumferential disc bulge, ligamentum flavum thickening, and bilateral facet arthropathy.  Moderate to severe spinal canal stenosis and bilateral neural foraminal narrowing.    L2-L3: Circumferential disc bulge, ligamentum flavum thickening, and bilateral facet arthropathy.  Mild-to-moderate spinal canal stenosis.  Severe bilateral neural foraminal narrowing.    L3-4: Circumferential disc bulge, ligamentum flavum thickening, and bilateral facet arthropathy.  Mild spinal canal stenosis.  Severe bilateral neural foraminal narrowing.    L4-L5: Circumferential disc bulge, ligamentum flavum thickening, and bilateral facet arthropathy.  Mild spinal canal stenosis.  Severe bilateral neural foraminal narrowing.    L5-S1: Broad-based disc bulge and bilateral facet arthropathy.  No significant spinal canal stenosis.  Moderate left-sided and mild right-sided neural foraminal narrowing.    Limited views of the posterior abdominal contents demonstrate mild nodular thickening of the left adrenal gland.  There is aortic atherosclerosis.  There is no retroperitoneal hematoma.  Prostate is enlarged measuring 5.8 cm.  No significant free fluid in the pelvis.    The visualized bony sacrum appears grossly intact.  Degenerative changes noted the bilateral sacroiliac joints.                                       X-Ray Lumbar Spine 5 View (Final result)  Result time 11/30/24 21:25:05   Procedure changed from X-Ray Lumbar Spine Ap And Lateral     Final result by Scooter Payton MD (11/30/24 21:25:05)                   Impression:      Mild chronic appearing height loss of the L3  vertebral body.  Further evaluation and follow-up with CT as warranted.      Electronically signed by: Scooter Payton MD  Date:    11/30/2024  Time:    21:25               Narrative:    EXAMINATION:  XR LUMBAR SPINE COMPLETE 5 VIEW    CLINICAL HISTORY:  fall;    TECHNIQUE:  AP, lateral, spot and bilateral oblique views of the lumbar spine were performed.    COMPARISON:  None    FINDINGS:  There is diffuse osteopenia.  There is mild chronic appearing height loss of the L3 vertebral body.  The remaining lumbar vertebral body heights appear relatively well maintained without evidence of severe compression deformity.  There is mild exaggeration of the normal lumbar lordosis.  There are moderate advanced multilevel degenerative discogenic changes present.  There is lower lumbar facet arthropathy.                                        X-Ray Thoracic Spine AP Lateral (Final result)  Result time 11/30/24 21:21:13      Final result by Scooter Payton MD (11/30/24 21:21:13)                   Impression:      Irregular lucency transversing the anterior cortex and osteophyte of the likely T8 vertebral body concerning for acute fracture.  Recommend further evaluation with dedicated thoracic spine CT for further characterization and assessment.    Mild superior endplate deformity of the T2 vertebral body with associated mild anterior wedging.    This report was flagged in Epic as abnormal.      Electronically signed by: Scooter Payton MD  Date:    11/30/2024  Time:    21:21               Narrative:    EXAMINATION:  XR THORACIC SPINE AP LATERAL    CLINICAL HISTORY:  Unspecified fall, initial encounter    TECHNIQUE:  AP and lateral views of the thoracic spine were performed.    COMPARISON:  None    FINDINGS:  There is a mild superior endplate deformity of the T2 vertebral body with associated mild anterior wedging.  Additionally, there is an irregular lucency extending through likely the anterior cortex/osteophyte of the T8  vertebral body concerning for acute fracture.    Thoracic spine alignment appears within normal limits.  There is apparent bridging anterior osteophytosis throughout the thoracic spine.  There is moderate multilevel degenerative discogenic change present.                                       X-Ray Chest PA And Lateral (Final result)  Result time 11/30/24 21:21:30      Final result by Scooter Payton MD (11/30/24 21:21:30)                   Impression:      No convincing radiographic evidence of acute intrathoracic process.      Electronically signed by: Scooter Payton MD  Date:    11/30/2024  Time:    21:21               Narrative:    EXAMINATION:  XR CHEST PA AND LATERAL    CLINICAL HISTORY:  Unspecified fall, initial encounter    TECHNIQUE:  PA and lateral views of the chest were performed.    COMPARISON:  None    FINDINGS:  Cardiac silhouette is not significantly enlarged.  There is mild aortic atherosclerosis.  There is mild left basilar atelectasis or scarring.  No large confluent airspace consolidation identified.  No significant volume of pleural fluid or pneumothorax identified.  Osseous structures demonstrate degenerative changes.                                       X-Ray Cervical Spine Complete 5 view (Final result)  Result time 11/30/24 21:14:30   Procedure changed from X-Ray Cervical Spine AP And Lateral     Final result by Scooter Payton MD (11/30/24 21:14:30)                   Impression:      No definite radiographic evidence of acute displaced fracture or traumatic subluxation of the cervical spine.      Electronically signed by: Scooter Payton MD  Date:    11/30/2024  Time:    21:14               Narrative:    EXAMINATION:  XR CERVICAL SPINE COMPLETE 5 VIEW    CLINICAL HISTORY:  fall;.    TECHNIQUE:  AP, Lateral, bilateral oblique and open mouth views of the cervical spine were performed.    COMPARISON:  None    FINDINGS:  There is diffuse osteopenia.  The cervical spine is well  visualized to the level of the C7 superior endplate on the lateral view.  There is straightening of the normal cervical lordosis.  Cervical vertebral body heights appear adequately maintained without definite acute fracture.  There is moderate multilevel degenerative discogenic change throughout the cervical spine.  No significant prevertebral soft tissue edema appreciated.  There is bilateral facet arthropathy.  No significant prevertebral soft tissue edema appreciated.                                       Medications   sodium chloride 0.9% flush 3 mL (has no administration in time range)   melatonin tablet 6 mg (has no administration in time range)   ondansetron injection 4 mg (has no administration in time range)   senna-docusate 8.6-50 mg per tablet 2 tablet (has no administration in time range)   acetaminophen tablet 650 mg (has no administration in time range)   aluminum-magnesium hydroxide-simethicone 200-200-20 mg/5 mL suspension 30 mL (has no administration in time range)   acetaminophen tablet 650 mg (has no administration in time range)   HYDROcodone-acetaminophen 5-325 mg per tablet 2 tablet (has no administration in time range)   naloxone 0.4 mg/mL injection 0.02 mg (has no administration in time range)   potassium bicarbonate disintegrating tablet 50 mEq (has no administration in time range)   potassium bicarbonate disintegrating tablet 35 mEq (has no administration in time range)   potassium bicarbonate disintegrating tablet 60 mEq (has no administration in time range)   magnesium oxide tablet 800 mg (has no administration in time range)   magnesium oxide tablet 800 mg (has no administration in time range)   potassium, sodium phosphates 280-160-250 mg packet 2 packet (has no administration in time range)   potassium, sodium phosphates 280-160-250 mg packet 2 packet (has no administration in time range)   potassium, sodium phosphates 280-160-250 mg packet 2 packet (has no administration in time range)    glucose chewable tablet 16 g (has no administration in time range)   glucose chewable tablet 24 g (has no administration in time range)   dextrose 50% injection 12.5 g (has no administration in time range)   dextrose 50% injection 25 g (has no administration in time range)   glucagon (human recombinant) injection 1 mg (has no administration in time range)   famotidine (PF) injection 20 mg (20 mg Intravenous Given 12/1/24 0156)   HYDROmorphone injection 0.5 mg (has no administration in time range)   diltiaZEM 24 hr capsule 300 mg (has no administration in time range)   oxyCODONE-acetaminophen 5-325 mg per tablet 1 tablet (1 tablet Oral Given 11/30/24 2057)   methocarbamoL tablet 1,000 mg (1,000 mg Oral Given 11/30/24 2215)   morphine injection 4 mg (4 mg Intramuscular Given 11/30/24 2218)   morphine injection 4 mg (4 mg Intravenous Given 11/30/24 2321)   ondansetron injection 4 mg (4 mg Intravenous Given 11/30/24 2320)     Medical Decision Making             ED Course as of 12/01/24 0521   Sun Dec 01, 2024   0519 Glucose(!): 172 [JA]   0521 WBC: 10.89 [JA]   0521 INR: 1.0 [JA]   0521 Creatinine: 1.1 [JA]      ED Course User Index  [JA] Gisele Crocker MD          Patient presents to ED as above.  Vitals stable.  Differential includes not limited to fracture, contusion, hematoma, pneumothorax, intra-abdominal injury.  He is on Eliquis.  Patient has no visible external signs of trauma.  He is GCS 15.     Initial x-rays of the chest, C-spine, T-spine and lumbar spine obtained.  Per radiology read, there is suspicion for T8 vertebral body fracture.  CT of the thoracic and lumbar spine obtained.  Per rads, Diffuse free-flowing flowing anterior osteophytosis throughout the thoracic spine in keeping with DISH. Acute minimally displaced fracture of the T9 vertebral body involving the fused anterior osteophyte and anterior cortex extending into the superior endplate and likely the left transverse process of T9. Fracture  "configuration may reflect a "chalk stick" variant fracture given fused anterior osteophytosis. Neurosurgical consultation and evaluation is advised. There are multiple other chronic findings.  I discussed the case with ELEAZAR Kirkland on call.  Recommends admission for TLSO brace, MRI of the T and L-spine without contrast for further evaluation and pain control.  MRIs have been ordered.  They are pending at time of admission.  Labs reviewed as above and are fairly unremarkable.  Patient was given oral oxycodone, oral Robaxin and IV morphine here with moderate pain control.  Hospitalist consulted for admission.              Clinical Impression:  Final diagnoses:  [W19.XXXA] Fall  [S22.078A] Other closed fracture of ninth thoracic vertebra, initial encounter (Primary)  [M48.10] DISH (diffuse idiopathic skeletal hyperostosis)          ED Disposition Condition    Observation Stable                Gisele Crocker MD  12/01/24 0522    "

## 2024-12-02 ENCOUNTER — TELEPHONE (OUTPATIENT)
Dept: NEUROSURGERY | Facility: CLINIC | Age: 84
End: 2024-12-02
Payer: MEDICARE

## 2024-12-02 VITALS
OXYGEN SATURATION: 95 % | HEIGHT: 73 IN | WEIGHT: 206.38 LBS | RESPIRATION RATE: 17 BRPM | TEMPERATURE: 98 F | BODY MASS INDEX: 27.35 KG/M2 | SYSTOLIC BLOOD PRESSURE: 169 MMHG | HEART RATE: 79 BPM | DIASTOLIC BLOOD PRESSURE: 99 MMHG

## 2024-12-02 DIAGNOSIS — S22.071D: Primary | ICD-10-CM

## 2024-12-02 LAB
ALBUMIN SERPL BCP-MCNC: 4 G/DL (ref 3.5–5.2)
ALP SERPL-CCNC: 56 U/L (ref 55–135)
ALT SERPL W/O P-5'-P-CCNC: 14 U/L (ref 10–44)
ANION GAP SERPL CALC-SCNC: 7 MMOL/L (ref 8–16)
AST SERPL-CCNC: 14 U/L (ref 10–40)
BASOPHILS # BLD AUTO: 0.04 K/UL (ref 0–0.2)
BASOPHILS NFR BLD: 0.5 % (ref 0–1.9)
BILIRUB SERPL-MCNC: 0.8 MG/DL (ref 0.1–1)
BUN SERPL-MCNC: 17 MG/DL (ref 8–23)
CALCIUM SERPL-MCNC: 8.8 MG/DL (ref 8.7–10.5)
CHLORIDE SERPL-SCNC: 104 MMOL/L (ref 95–110)
CO2 SERPL-SCNC: 28 MMOL/L (ref 23–29)
CREAT SERPL-MCNC: 1.1 MG/DL (ref 0.5–1.4)
DIFFERENTIAL METHOD BLD: ABNORMAL
EOSINOPHIL # BLD AUTO: 0.7 K/UL (ref 0–0.5)
EOSINOPHIL NFR BLD: 8.9 % (ref 0–8)
ERYTHROCYTE [DISTWIDTH] IN BLOOD BY AUTOMATED COUNT: 13.3 % (ref 11.5–14.5)
EST. GFR  (NO RACE VARIABLE): >60 ML/MIN/1.73 M^2
GLUCOSE SERPL-MCNC: 105 MG/DL (ref 70–110)
HCT VFR BLD AUTO: 43.6 % (ref 40–54)
HGB BLD-MCNC: 14.6 G/DL (ref 14–18)
IMM GRANULOCYTES # BLD AUTO: 0.02 K/UL (ref 0–0.04)
IMM GRANULOCYTES NFR BLD AUTO: 0.3 % (ref 0–0.5)
LYMPHOCYTES # BLD AUTO: 1.8 K/UL (ref 1–4.8)
LYMPHOCYTES NFR BLD: 22.9 % (ref 18–48)
MAGNESIUM SERPL-MCNC: 1.8 MG/DL (ref 1.6–2.6)
MCH RBC QN AUTO: 32.5 PG (ref 27–31)
MCHC RBC AUTO-ENTMCNC: 33.5 G/DL (ref 32–36)
MCV RBC AUTO: 97 FL (ref 82–98)
MONOCYTES # BLD AUTO: 0.8 K/UL (ref 0.3–1)
MONOCYTES NFR BLD: 9.9 % (ref 4–15)
NEUTROPHILS # BLD AUTO: 4.4 K/UL (ref 1.8–7.7)
NEUTROPHILS NFR BLD: 57.5 % (ref 38–73)
NRBC BLD-RTO: 0 /100 WBC
PLATELET # BLD AUTO: 174 K/UL (ref 150–450)
PMV BLD AUTO: 10.4 FL (ref 9.2–12.9)
POCT GLUCOSE: 111 MG/DL (ref 70–110)
POCT GLUCOSE: 121 MG/DL (ref 70–110)
POTASSIUM SERPL-SCNC: 4.6 MMOL/L (ref 3.5–5.1)
PROT SERPL-MCNC: 6.3 G/DL (ref 6–8.4)
RBC # BLD AUTO: 4.49 M/UL (ref 4.6–6.2)
SODIUM SERPL-SCNC: 139 MMOL/L (ref 136–145)
WBC # BLD AUTO: 7.68 K/UL (ref 3.9–12.7)

## 2024-12-02 PROCEDURE — 97165 OT EVAL LOW COMPLEX 30 MIN: CPT

## 2024-12-02 PROCEDURE — G0378 HOSPITAL OBSERVATION PER HR: HCPCS

## 2024-12-02 PROCEDURE — 97535 SELF CARE MNGMENT TRAINING: CPT

## 2024-12-02 PROCEDURE — 83735 ASSAY OF MAGNESIUM: CPT | Performed by: INTERNAL MEDICINE

## 2024-12-02 PROCEDURE — 25000003 PHARM REV CODE 250: Performed by: INTERNAL MEDICINE

## 2024-12-02 PROCEDURE — 80053 COMPREHEN METABOLIC PANEL: CPT | Performed by: INTERNAL MEDICINE

## 2024-12-02 PROCEDURE — 96376 TX/PRO/DX INJ SAME DRUG ADON: CPT

## 2024-12-02 PROCEDURE — 36415 COLL VENOUS BLD VENIPUNCTURE: CPT | Performed by: INTERNAL MEDICINE

## 2024-12-02 PROCEDURE — 85025 COMPLETE CBC W/AUTO DIFF WBC: CPT | Performed by: INTERNAL MEDICINE

## 2024-12-02 PROCEDURE — 97161 PT EVAL LOW COMPLEX 20 MIN: CPT

## 2024-12-02 RX ORDER — HYDROCODONE BITARTRATE AND ACETAMINOPHEN 5; 325 MG/1; MG/1
2 TABLET ORAL EVERY 6 HOURS PRN
Qty: 20 TABLET | Refills: 0 | Status: SHIPPED | OUTPATIENT
Start: 2024-12-02

## 2024-12-02 RX ADMIN — FAMOTIDINE 20 MG: 10 INJECTION, SOLUTION INTRAVENOUS at 08:12

## 2024-12-02 RX ADMIN — SENNOSIDES AND DOCUSATE SODIUM 2 TABLET: 8.6; 5 TABLET ORAL at 08:12

## 2024-12-02 RX ADMIN — APIXABAN 5 MG: 5 TABLET, FILM COATED ORAL at 08:12
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
## 2024-12-02 NOTE — DISCHARGE SUMMARY
"[facility] Medicine  Discharge Summary      Patient Name: Domitila Walters  MRN: [id number]  KHAI: [id number]  Patient Class: OP- Observation  Admission Date: 11/30/2024  Hospital Length of Stay: 0 days  Discharge Date and Time:  12/02/2024 9:51 AM  Attending Physician: Terra Barrera MD   Discharging Provider: Terra Barrera MD, MD  Primary Care Provider: Isiah Crespo MD    Primary Care Team: Networked reference to record PCT     HPI:    84  WM iwith PMHx afib on Eliquis, diabetes, GERD, hypertension who presents to the ED after mechanical fall around 645 this evening.  Patient states he tripped over a curb and fell into a tree hitting his mid thoracic and low back.  He states it knocked the breath out of him" initially.  He did not hit his head or lose consciousness.  He was assisted up by bystanders.  He was able to walk on his own.  He initially went home and tried to lay down but the back pain increased prompting ED visit.  He denies any focal weakness or paresthesias.  Denies chest pain, abdominal pain, bowel or bladder dysfunction.  He has urinated and did not notice any gross hematuria.     Non smoke  Non drinker    Lives at home with wife     Previously demonstrated T9 vertebral body fracture disrupting the bridging anterior osteophyte as well as the anterior cortex extending into the superior endplate with slight central height loss.  Mild marrow edema signal is noted within bilateral pedicles which may reflect nondisplaced component extending into the posterior elements.  Additionally, there is focal disruption of the ALL at the level of T9 as well as edema signal with focal short segment discontinuity of the supraspinous ligament at the level of T8-T9.  No obvious disruption of PLL appreciated.  Neurosurgical consultation/evaluation is advised in light of potential instability.     Possible atypical hemangioma involving the right posterolateral aspect of the T8 vertebral body " extending into the right pedicle.  There is marked associated STIR hyperintensity/marrow edema associated with the lesion which may reflect a nondisplaced fracture extending through the lesion into the right pedicle, not well appreciated on prior CT examination.     No definite thoracic cord signal abnormality.     Suspected nondisplaced left sacral alar fracture.     Advanced multilevel degenerative changes of the lumbar spine as detailed above.      CT Thoracic spine in ER     There is diffuse flowing, bridging anterior osteophytosis throughout the thoracic spine in keeping with DISH.  There is an acute minimally displaced obliquely oriented fracture at the level of the T9 vertebral body.  This transverses the fused anterior osteophyte at T9 and anterior cortex extending to the superior endplate.  There is a suspected nondisplaced component extending into the left transverse process of T9 (for example axial series 4, image 96).  Facet alignment appears within normal limits without evidence of perched facet.  No significant abnormal widening of the disc space appreciated.  Evaluation of the epidural space is limited by CT technique.  There is mild associated prevertebral fat stranding/edema.     There is a mild chronic appearing superior endplate deformity of the T3 vertebral body with mild anterior wedging.  The remaining thoracic vertebral body heights appear adequately maintained.  No definite additional acute displaced fractures identified.  There is moderate multilevel degenerative discogenic change throughout the thoracic spine.          In the ER     Labs normal     He was given morphine 4 mg IV twice       They spoke to Neurosurgery who wanted MRI        And they will consult       He was in no distress when I saw him    Breathing fine       Plan is to admit to our service tonight     * No surgery found *      Hospital Course:   The patient was admitted and given prn pain meds. Neurosurgery recommended TLSO  brace and f/u outpatient with them. The patient's pain improved. He was seen and examined on the day of discharge and exam was as below:  VSS  HEENT WNL  CV rrr  Lungs CTAB  Abd soft  Some tenderness to thoracic area, but no instability  Skin without lesions    He was discharged home in stable condition     Goals of Care Treatment Preferences:  Code Status: Full Code    Living Will: Yes              SDOH Screening:  The patient was screened for utility difficulties, food insecurity, transport difficulties, housing insecurity, and interpersonal safety and there were no concerns identified this admission.     Consults:   Consults (From admission, onward)          Status Ordering Provider     Inpatient consult to Neurosurgery  Once        Provider:  Kit Paez MD    Completed TROY EDOUARD  * Closed fracture of ninth thoracic vertebra  F/U outpatient with neurosurgery          Cardiac/Vascular  A-fib  Home meds          Final Active Diagnoses:    Diagnosis Date Noted POA    PRINCIPAL PROBLEM:  Closed fracture of ninth thoracic vertebra [S22.079A] 12/01/2024 Yes    A-fib [I48.91] 12/01/2024 Yes      Problems Resolved During this Admission:       Discharged Condition: stable    Disposition: Home or Self Care    Follow Up:   Follow-up Information       Isiah Crespo MD Follow up.    Specialty: Family Medicine  Contact information:  [address] [place] [address] [id number] [phone]                           Patient Instructions:      Ambulatory referral/consult to Neurosurgery   Standing Status: Future   Referral Priority: Routine Referral Type: Consultation   Referral Reason: Specialty Services Required   Requested Specialty: Neurosurgery   Number of Visits Requested: 1     Diet diabetic     Activity as tolerated       Significant Diagnostic Studies: N/A    Pending Diagnostic Studies:       None           Medications:  Reconciled Home Medications:      Medication List        START taking these  medications      HYDROcodone-acetaminophen 5-325 mg per tablet  Commonly known as: NORCO  Take 2 tablets by mouth every 6 (six) hours as needed for Pain.            CONTINUE taking these medications      acarbose 25 MG Tab  Commonly known as: PRECOSE  TAKE ONE TABLET BY MOUTH TWICE DAILY WITH MEALS     blood sugar diagnostic Strp  1 strip by Misc.(Non-Drug; Combo Route) route 2 (two) times daily with meals.     blood-glucose meter Misc  1 Device by Misc.(Non-Drug; Combo Route) route 2 (two) times daily with meals.     calcium-vitamin D3 500 mg-5 mcg (200 unit) per tablet  Commonly known as: OS-BROOKLYNN 500 + D3  Take 1 tablet by mouth 2 (two) times daily with meals.     cholecalciferol (vitamin D3) 25 mcg (1,000 unit) capsule  Commonly known as: VITAMIN D3  Take 1 capsule (1,000 Units total) by mouth once daily.     diltiaZEM 300 MG 24 hr capsule  Commonly known as: CARDIZEM CD  TAKE ONE CAPSULE BY MOUTH EVERY DAY     ELIQUIS 5 mg Tab  Generic drug: apixaban  TAKE ONE TABLET BY MOUTH TWICE DAILY     FOLBIC 2.5-25-2 mg Tab  Generic drug: folic acid-vit B6-vit B12 2.5-25-2 mg  TAKE ONE TABLET BY MOUTH DAILY     lancets Misc  1 each by Misc.(Non-Drug; Combo Route) route 2 (two) times daily with meals.     lancing device with lancets Kit  1 Device by Misc.(Non-Drug; Combo Route) route 2 (two) times daily with meals.     OCUVITE ORAL  Take by mouth.     omeprazole 20 MG capsule  Commonly known as: PRILOSEC  TAKE ONE CAPSULE BY MOUTH ONCE DAILY     pravastatin 40 MG tablet  Commonly known as: PRAVACHOL  TAKE ONE TABLET BY MOUTH ONCE DAILY              Indwelling Lines/Drains at time of discharge:   Lines/Drains/Airways       None                   Time spent on the discharge of patient: 30 minutes         Terra Barrera MD, MD  Department of Hospital Medicine  [facility]
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
## 2024-12-02 NOTE — TELEPHONE ENCOUNTER
After reviewing chart with provider Jennifer Hough PA-C, pt should see Dr. Horton. Attempted to call pt to schedule a hospital f/u appt with Dr. Horton. Unable to reach left voicemail with callback number.

## 2024-12-02 NOTE — TELEPHONE ENCOUNTER
----- Message from IRAM Reynolds sent at 12/2/2024 11:45 AM CST -----  Regarding: FW: hospital follow up- T9 fracture  Please schedule hospital f/u w/ K. HIRO Hough per Dr. Horton with a xray and link referral. Thank you.  ----- Message -----  From: Pippa Cardenas RN  Sent: 12/2/2024  10:15 AM CST  To: Clifford Hernandez Staff  Subject: hospital follow up- T9 fracture                  Good morning,   Patient discharging from [facility] for t9 fracture. Currently in TLSO brace, needs follow up.     Thanks,   Wilma RANDALL

## 2024-12-02 NOTE — PLAN OF CARE
IMM signed by patient, scanned to chart.      12/02/24 1028   Medicare Message   Important Message from Medicare regarding Discharge Appeal Rights Given to patient/caregiver;Explained to patient/caregiver;Signed/date by patient/caregiver   Date IMM was signed 12/02/24   Time IMM was signed 0932

## 2024-12-02 NOTE — HOSPITAL COURSE
The patient was admitted and given prn pain meds. Neurosurgery recommended TLSO brace and f/u outpatient with them. The patient's pain improved. He was seen and examined on the day of discharge and exam was as below:  VSS  HEENT WNL  CV rrr  Lungs CTAB  Abd soft  Some tenderness to thoracic area, but no instability  Skin without lesions    He was discharged home in stable condition

## 2024-12-02 NOTE — PLAN OF CARE
Pt clear for DC from case management standpoint. Discharging to home with daughter to transport. Message sent to Dr. Billings' office for hospital follow up.  PCP appointment made.          12/02/24 1020   Final Note   Assessment Type Final Discharge Note   Anticipated Discharge Disposition Home   Hospital Resources/Appts/Education Provided Appointments scheduled and added to AVS

## 2024-12-03 ENCOUNTER — PATIENT OUTREACH (OUTPATIENT)
Dept: ADMINISTRATIVE | Facility: CLINIC | Age: 84
End: 2024-12-03
Payer: MEDICARE

## 2024-12-03 ENCOUNTER — PATIENT MESSAGE (OUTPATIENT)
Dept: ADMINISTRATIVE | Facility: CLINIC | Age: 84
End: 2024-12-03
Payer: MEDICARE

## 2024-12-03 NOTE — PROGRESS NOTES
2nd Attempt made to reach patient for TCC call. Left voicemail please call [phone] leave first name, last name, and  for Ray I will return your call.    Message sent via [facility] portal for Post Discharge Attempt.

## 2024-12-03 NOTE — PROGRESS NOTES
C3 nurse attempted to contact Domitila Walters  for a TCC post hospital discharge follow up call. No answer. Left voicemail with callback information. The patient has a scheduled HOSFU appointment with Isiah Crespo MD  on 12/10/24 @ 1000.

## 2024-12-04 NOTE — TELEPHONE ENCOUNTER
Called pt and confirmed appt details for xray and f/u appt with Jennifer Hough PA-C. Provided address for both appts.

## 2024-12-04 NOTE — PROGRESS NOTES
3rd Attempt made to reach patient for TCC call. No answer. Left voicemail at patients spouse number to please have the patient call [phone] leave first name, last name, and  for Ray I will return your call.

## 2024-12-10 ENCOUNTER — LAB VISIT (OUTPATIENT)
Dept: LAB | Facility: HOSPITAL | Age: 84
End: 2024-12-10
Attending: FAMILY MEDICINE
Payer: MEDICARE

## 2024-12-10 DIAGNOSIS — E11.22 TYPE 2 DIABETES MELLITUS WITH STAGE 3A CHRONIC KIDNEY DISEASE, WITHOUT LONG-TERM CURRENT USE OF INSULIN: ICD-10-CM

## 2024-12-10 DIAGNOSIS — N18.31 TYPE 2 DIABETES MELLITUS WITH STAGE 3A CHRONIC KIDNEY DISEASE, WITHOUT LONG-TERM CURRENT USE OF INSULIN: ICD-10-CM

## 2024-12-10 LAB
ALBUMIN SERPL BCP-MCNC: 3.7 G/DL (ref 3.5–5.2)
ALP SERPL-CCNC: 68 U/L (ref 40–150)
ALT SERPL W/O P-5'-P-CCNC: 24 U/L (ref 10–44)
ANION GAP SERPL CALC-SCNC: 9 MMOL/L (ref 8–16)
AST SERPL-CCNC: 22 U/L (ref 10–40)
BASOPHILS # BLD AUTO: 0.06 K/UL (ref 0–0.2)
BASOPHILS NFR BLD: 0.8 % (ref 0–1.9)
BILIRUB SERPL-MCNC: 0.8 MG/DL (ref 0.1–1)
BUN SERPL-MCNC: 18 MG/DL (ref 8–23)
CALCIUM SERPL-MCNC: 8.7 MG/DL (ref 8.7–10.5)
CHLORIDE SERPL-SCNC: 106 MMOL/L (ref 95–110)
CHOLEST SERPL-MCNC: 147 MG/DL (ref 120–199)
CHOLEST/HDLC SERPL: 3.4 {RATIO} (ref 2–5)
CO2 SERPL-SCNC: 25 MMOL/L (ref 23–29)
CREAT SERPL-MCNC: 1.4 MG/DL (ref 0.5–1.4)
DIFFERENTIAL METHOD BLD: ABNORMAL
EOSINOPHIL # BLD AUTO: 0.8 K/UL (ref 0–0.5)
EOSINOPHIL NFR BLD: 11.7 % (ref 0–8)
ERYTHROCYTE [DISTWIDTH] IN BLOOD BY AUTOMATED COUNT: 13.4 % (ref 11.5–14.5)
EST. GFR  (NO RACE VARIABLE): 48.9 ML/MIN/1.73 M^2
ESTIMATED AVG GLUCOSE: 126 MG/DL (ref 68–131)
GLUCOSE SERPL-MCNC: 104 MG/DL (ref 70–110)
HBA1C MFR BLD: 6 % (ref 4–5.6)
HCT VFR BLD AUTO: 46.4 % (ref 40–54)
HDLC SERPL-MCNC: 43 MG/DL (ref 40–75)
HDLC SERPL: 29.3 % (ref 20–50)
HGB BLD-MCNC: 15.5 G/DL (ref 14–18)
IMM GRANULOCYTES # BLD AUTO: 0.01 K/UL (ref 0–0.04)
IMM GRANULOCYTES NFR BLD AUTO: 0.1 % (ref 0–0.5)
LDLC SERPL CALC-MCNC: 84.4 MG/DL (ref 63–159)
LYMPHOCYTES # BLD AUTO: 2.1 K/UL (ref 1–4.8)
LYMPHOCYTES NFR BLD: 29.4 % (ref 18–48)
MCH RBC QN AUTO: 32.9 PG (ref 27–31)
MCHC RBC AUTO-ENTMCNC: 33.4 G/DL (ref 32–36)
MCV RBC AUTO: 99 FL (ref 82–98)
MONOCYTES # BLD AUTO: 0.7 K/UL (ref 0.3–1)
MONOCYTES NFR BLD: 10.4 % (ref 4–15)
NEUTROPHILS # BLD AUTO: 3.4 K/UL (ref 1.8–7.7)
NEUTROPHILS NFR BLD: 47.6 % (ref 38–73)
NONHDLC SERPL-MCNC: 104 MG/DL
NRBC BLD-RTO: 0 /100 WBC
PLATELET # BLD AUTO: 228 K/UL (ref 150–450)
PMV BLD AUTO: 10.8 FL (ref 9.2–12.9)
POTASSIUM SERPL-SCNC: 3.9 MMOL/L (ref 3.5–5.1)
PROT SERPL-MCNC: 6.5 G/DL (ref 6–8.4)
RBC # BLD AUTO: 4.71 M/UL (ref 4.6–6.2)
SODIUM SERPL-SCNC: 140 MMOL/L (ref 136–145)
TRIGL SERPL-MCNC: 98 MG/DL (ref 30–150)
WBC # BLD AUTO: 7.11 K/UL (ref 3.9–12.7)

## 2024-12-10 PROCEDURE — 80061 LIPID PANEL: CPT | Performed by: FAMILY MEDICINE

## 2024-12-10 PROCEDURE — 85025 COMPLETE CBC W/AUTO DIFF WBC: CPT | Performed by: FAMILY MEDICINE

## 2024-12-10 PROCEDURE — 83036 HEMOGLOBIN GLYCOSYLATED A1C: CPT | Performed by: FAMILY MEDICINE

## 2024-12-10 PROCEDURE — 80053 COMPREHEN METABOLIC PANEL: CPT | Performed by: FAMILY MEDICINE

## 2024-12-11 NOTE — PROGRESS NOTES
I note some minor lab abnormalities that have been stable over time, these are of doubtful clinical significance.  Please consider increase water intake 6-8 glasses of 8 oz today.

## 2024-12-17 ENCOUNTER — OFFICE VISIT (OUTPATIENT)
Dept: PRIMARY CARE CLINIC | Facility: CLINIC | Age: 84
End: 2024-12-17
Payer: MEDICARE

## 2024-12-17 ENCOUNTER — TELEPHONE (OUTPATIENT)
Dept: PRIMARY CARE CLINIC | Facility: CLINIC | Age: 84
End: 2024-12-17
Payer: MEDICARE

## 2024-12-17 VITALS
HEART RATE: 61 BPM | WEIGHT: 204.69 LBS | BODY MASS INDEX: 27.13 KG/M2 | OXYGEN SATURATION: 95 % | HEIGHT: 73 IN | SYSTOLIC BLOOD PRESSURE: 128 MMHG | TEMPERATURE: 98 F | DIASTOLIC BLOOD PRESSURE: 80 MMHG

## 2024-12-17 DIAGNOSIS — N18.31 STAGE 3A CHRONIC KIDNEY DISEASE: ICD-10-CM

## 2024-12-17 DIAGNOSIS — E11.22 TYPE 2 DIABETES MELLITUS WITH STAGE 3A CHRONIC KIDNEY DISEASE, WITHOUT LONG-TERM CURRENT USE OF INSULIN: Primary | ICD-10-CM

## 2024-12-17 DIAGNOSIS — S22.071D: ICD-10-CM

## 2024-12-17 DIAGNOSIS — N18.31 TYPE 2 DIABETES MELLITUS WITH STAGE 3A CHRONIC KIDNEY DISEASE, WITHOUT LONG-TERM CURRENT USE OF INSULIN: Primary | ICD-10-CM

## 2024-12-17 DIAGNOSIS — Z23 FLU VACCINE NEED: ICD-10-CM

## 2024-12-17 PROCEDURE — 90653 IIV ADJUVANT VACCINE IM: CPT | Mod: PBBFAC,PO

## 2024-12-17 PROCEDURE — 99214 OFFICE O/P EST MOD 30 MIN: CPT | Mod: S$PBB,,, | Performed by: FAMILY MEDICINE

## 2024-12-17 PROCEDURE — G2211 COMPLEX E/M VISIT ADD ON: HCPCS | Mod: S$PBB,,, | Performed by: FAMILY MEDICINE

## 2024-12-17 PROCEDURE — 99999 PR PBB SHADOW E&M-EST. PATIENT-LVL IV: CPT | Mod: PBBFAC,,, | Performed by: FAMILY MEDICINE

## 2024-12-17 PROCEDURE — 99214 OFFICE O/P EST MOD 30 MIN: CPT | Mod: PBBFAC,PO | Performed by: FAMILY MEDICINE

## 2024-12-17 PROCEDURE — G0008 ADMIN INFLUENZA VIRUS VAC: HCPCS | Mod: PBBFAC,PO

## 2024-12-17 PROCEDURE — 99999PBSHW PR PBB SHADOW TECHNICAL ONLY FILED TO HB: Mod: PBBFAC,,,

## 2024-12-17 RX ADMIN — INFLUENZA A VIRUS A/VICTORIA/4897/2022 IVR-238 (H1N1) ANTIGEN (FORMALDEHYDE INACTIVATED), INFLUENZA A VIRUS A/THAILAND/8/2022 IVR-237 (H3N2) ANTIGEN (FORMALDEHYDE INACTIVATED), INFLUENZA B VIRUS B/AUSTRIA/1359417/2021 BVR-26 ANTIGEN (FORMALDEHYDE INACTIVATED) 0.5 ML: 15; 15; 15 INJECTION, SUSPENSION INTRAMUSCULAR at 10:12

## 2024-12-19 NOTE — PROGRESS NOTES
"Subjective:       Patient ID: Domitila Walters is a 84 y.o. male.    Chief Complaint: Follow-up    HPI:    84  WM iwith PMHx afib on Eliquis, diabetes, GERD, hypertension who presents to the ED after mechanical fall around 645 this evening.  Patient states he tripped over a curb and fell into a tree hitting his mid thoracic and low back.  He states it "knocked the breath out of him" initially.  He did not hit his head or lose consciousness.  He was assisted up by bystanders.  He was able to walk on his own.  He initially went home and tried to lay down but the back pain increased prompting ED visit.  He denies any focal weakness or paresthesias.  Denies chest pain, abdominal pain, bowel or bladder dysfunction.  He has urinated and did not notice any gross hematuria.      Non smoke  Non drinker     Lives at home with wife      Previously demonstrated T9 vertebral body fracture disrupting the bridging anterior osteophyte as well as the anterior cortex extending into the superior endplate with slight central height loss.  Mild marrow edema signal is noted within bilateral pedicles which may reflect nondisplaced component extending into the posterior elements.  Additionally, there is focal disruption of the ALL at the level of T9 as well as edema signal with focal short segment discontinuity of the supraspinous ligament at the level of T8-T9.  No obvious disruption of PLL appreciated.  Neurosurgical consultation/evaluation is advised in light of potential instability.     Possible atypical hemangioma involving the right posterolateral aspect of the T8 vertebral body extending into the right pedicle.  There is marked associated STIR hyperintensity/marrow edema associated with the lesion which may reflect a nondisplaced fracture extending through the lesion into the right pedicle, not well appreciated on prior CT examination.     No definite thoracic cord signal abnormality.     Suspected nondisplaced left sacral alar " fracture.     Advanced multilevel degenerative changes of the lumbar spine as detailed above.        CT Thoracic spine in ER      There is diffuse flowing, bridging anterior osteophytosis throughout the thoracic spine in keeping with DISH.  There is an acute minimally displaced obliquely oriented fracture at the level of the T9 vertebral body.  This transverses the fused anterior osteophyte at T9 and anterior cortex extending to the superior endplate.  There is a suspected nondisplaced component extending into the left transverse process of T9 (for example axial series 4, image 96).  Facet alignment appears within normal limits without evidence of perched facet.  No significant abnormal widening of the disc space appreciated.  Evaluation of the epidural space is limited by CT technique.  There is mild associated prevertebral fat stranding/edema.     There is a mild chronic appearing superior endplate deformity of the T3 vertebral body with mild anterior wedging.  The remaining thoracic vertebral body heights appear adequately maintained.  No definite additional acute displaced fractures identified.  There is moderate multilevel degenerative discogenic change throughout the thoracic spine.              In the ER      Labs normal      He was given morphine 4 mg IV twice         They spoke to Neurosurgery who wanted MRI         And they will consult         He was in no distress when I saw him     Breathing fine         Plan is to admit to our service tonight      * No surgery found *       Hospital Course:   The patient was admitted and given prn pain meds. Neurosurgery recommended TLSO brace and f/u outpatient with them. The patient's pain improved. He was seen and examined on the day of discharge and exam was as below:  He has concern regarding his Calcium blocker not renewed.  He has no loss of conscious, no progressive neuropathy no weakness no muscle weakness.  He is still under follow-up by Neurosurgery.  No  head trauma.   X-Ray Thoracic Spine AP Lateral  Narrative: EXAMINATION:  XR THORACIC SPINE AP LATERAL    CLINICAL HISTORY:  t9 fracture, neurosurgeon wants xray done with patient in TLSO brace;    TECHNIQUE:  AP and lateral views of the thoracic spine were performed.    COMPARISON:  Radiograph, CT 11/30/2024; MRI 12/01/2024    FINDINGS:  There is a previously demonstrated fracture of the T9 vertebral body, stable from prior examination.  Stable mild chronic deformity of the T3 vertebral body.  There is multilevel flowing anterior osteophytosis throughout the thoracic spine.  Alignment appears stable from prior examination.  No new skeletal abnormality identified.  Impression: As above.    Electronically signed by: Scooter Payton MD  Date:    12/01/2024  Time:    19:25  MRI Lumbar Spine Without Contrast  Narrative: EXAMINATION:  MRI THORACIC SPINE WITHOUT CONTRAST; MRI LUMBAR SPINE WITHOUT CONTRAST    CLINICAL HISTORY:  Spine fracture, thoracic, traumatic;; Low back pain, increased fracture risk;    TECHNIQUE:  Multiplanar, multisequence images were performed through the thoracic and lumbar spine.  Contrast was not administered.    COMPARISON:  CT thoracic and lumbar spine 11/30/2024    FINDINGS:  THORACIC SPINE:    There is a minimally displaced fracture again demonstrated with associated T2/STIR hyperintensity of the T9 vertebral body.  This involves the bridging anterior osteophyte as well as the anterior cortex extending into the superior endplate.  There is slight central height loss.  There is mild edema noted within the bilateral pedicles which may reflect nondisplaced component extending into the posterior elements.  There is focal disruption of the ALL at the level of the T9 vertebral body.  Additionally, there is focal edema signal at the level of the T8-T9 with associated focal short segment discontinuity of the supraspinous ligament.  No obvious disruption of the PLL appreciated.  No discrete epidural  hematoma or focal cord signal abnormality identified.  There is prevertebral edema extending from T8-T10.    Additionally, there is an approximately 3.7 cm lesion within the right posterolateral aspect of the T8 vertebral body extending into the right pedicle.  This appears T2 hyperintense and T1 hypointense.  This may reflect an atypical hemangioma, although is incompletely characterized on this noncontrast study..  There is marked STIR signal/marrow edema associated with the lesion which may reflect a nondisplaced fracture through the lesion extending into the right pedicle, not well appreciated on prior CT examination (for example thoracic spine sagittal STIR series 9, image 8).    There is mild chronic anterior wedging of the T3 vertebral body.  Thoracic spine alignment otherwise appears within normal limits.  There is bridging anterior osteophytosis throughout the thoracic spine.  No evidence of diffuse bone marrow replacement process.  There are multiple osseous hemangiomas throughout the thoracic spine including the T2, T4, T5, T8, T9, and T11 vertebral bodies.    The thoracic cord is normal in caliber and contour without evidence of focal signal abnormality.  There is mild multilevel facet arthropathy throughout the lower thoracic spine.  There is mild spinal canal stenosis at T9-T10 and T10-T11.    LUMBAR SPINE:    There is mild stepwise retrolisthesis of L2 on L3 and L3 on L4.  There is minimal chronic height loss of the L3 vertebral body.  No acute compression fracture deformities of the lumbar spine identified.  There is moderate multilevel degenerative discogenic change with associated disc desiccation and height loss.  There is prominent Schmorl node formation involving the L1 inferior endplate with associated marrow edema.  There is prominent intervertebral disc space height loss and endplate degenerative change at L3-L4.  There is an L1 vertebral body hemangioma.  No evidence of diffuse bone marrow  replacement process.  Conus terminates at the L1-L2 level.  Cauda equina appears grossly within normal limits.    There are degenerative changes as below:    L1-L2: Circumferential disc bulge, ligamentum flavum thickening, and bilateral facet arthropathy.  Moderate to severe spinal canal stenosis and bilateral neural foraminal narrowing.    L2-L3: Circumferential disc bulge, ligamentum flavum thickening, and bilateral facet arthropathy.  Moderate to severe spinal canal stenosis.  Severe bilateral neural foraminal narrowing.    L3-L4: Circumferential disc bulge, ligamentum flavum thickening, and bilateral facet arthropathy.  Mild spinal canal stenosis.  Severe bilateral neural foraminal narrowing.    L4-L5: Circumferential disc bulge, ligamentum flavum thickening, and bilateral facet arthropathy.  No significant spinal canal stenosis.  Severe bilateral neural foraminal narrowing, left greater than right.    L5-S1: Mild broad-based disc bulge.  No significant spinal canal stenosis.  Mild right-sided and moderate left-sided neural foraminal narrowing.    There is a linear T1/T2 hyperintensity within the left sacral ala suspicious for a nondisplaced fracture (for example series 7, image 53).  No definite discrete right sacral alar fracture identified.  There are degenerative changes of the bilateral sacroiliac joints.    There are several partially visualized bilateral renal T2 hyperintensities, possibly cysts.  Impression: Previously demonstrated T9 vertebral body fracture disrupting the bridging anterior osteophyte as well as the anterior cortex extending into the superior endplate with slight central height loss.  Mild marrow edema signal is noted within bilateral pedicles which may reflect nondisplaced component extending into the posterior elements.  Additionally, there is focal disruption of the ALL at the level of T9 as well as edema signal with focal short segment discontinuity of the supraspinous ligament at  the level of T8-T9.  No obvious disruption of PLL appreciated.  Neurosurgical consultation/evaluation is advised in light of potential instability.    Possible atypical hemangioma involving the right posterolateral aspect of the T8 vertebral body extending into the right pedicle.  There is marked associated STIR hyperintensity/marrow edema associated with the lesion which may reflect a nondisplaced fracture extending through the lesion into the right pedicle, not well appreciated on prior CT examination.    No definite thoracic cord signal abnormality.    Suspected nondisplaced left sacral alar fracture.    Advanced multilevel degenerative changes of the lumbar spine as detailed above.    This report was flagged in Epic as abnormal.    Electronically signed by: Scooter Payton MD  Date:    12/01/2024  Time:    01:56  MRI Thoracic Spine Without Contrast  Narrative: EXAMINATION:  MRI THORACIC SPINE WITHOUT CONTRAST; MRI LUMBAR SPINE WITHOUT CONTRAST    CLINICAL HISTORY:  Spine fracture, thoracic, traumatic;; Low back pain, increased fracture risk;    TECHNIQUE:  Multiplanar, multisequence images were performed through the thoracic and lumbar spine.  Contrast was not administered.    COMPARISON:  CT thoracic and lumbar spine 11/30/2024    FINDINGS:  THORACIC SPINE:    There is a minimally displaced fracture again demonstrated with associated T2/STIR hyperintensity of the T9 vertebral body.  This involves the bridging anterior osteophyte as well as the anterior cortex extending into the superior endplate.  There is slight central height loss.  There is mild edema noted within the bilateral pedicles which may reflect nondisplaced component extending into the posterior elements.  There is focal disruption of the ALL at the level of the T9 vertebral body.  Additionally, there is focal edema signal at the level of the T8-T9 with associated focal short segment discontinuity of the supraspinous ligament.  No obvious  disruption of the PLL appreciated.  No discrete epidural hematoma or focal cord signal abnormality identified.  There is prevertebral edema extending from T8-T10.    Additionally, there is an approximately 3.7 cm lesion within the right posterolateral aspect of the T8 vertebral body extending into the right pedicle.  This appears T2 hyperintense and T1 hypointense.  This may reflect an atypical hemangioma, although is incompletely characterized on this noncontrast study..  There is marked STIR signal/marrow edema associated with the lesion which may reflect a nondisplaced fracture through the lesion extending into the right pedicle, not well appreciated on prior CT examination (for example thoracic spine sagittal STIR series 9, image 8).    There is mild chronic anterior wedging of the T3 vertebral body.  Thoracic spine alignment otherwise appears within normal limits.  There is bridging anterior osteophytosis throughout the thoracic spine.  No evidence of diffuse bone marrow replacement process.  There are multiple osseous hemangiomas throughout the thoracic spine including the T2, T4, T5, T8, T9, and T11 vertebral bodies.    The thoracic cord is normal in caliber and contour without evidence of focal signal abnormality.  There is mild multilevel facet arthropathy throughout the lower thoracic spine.  There is mild spinal canal stenosis at T9-T10 and T10-T11.    LUMBAR SPINE:    There is mild stepwise retrolisthesis of L2 on L3 and L3 on L4.  There is minimal chronic height loss of the L3 vertebral body.  No acute compression fracture deformities of the lumbar spine identified.  There is moderate multilevel degenerative discogenic change with associated disc desiccation and height loss.  There is prominent Schmorl node formation involving the L1 inferior endplate with associated marrow edema.  There is prominent intervertebral disc space height loss and endplate degenerative change at L3-L4.  There is an L1  vertebral body hemangioma.  No evidence of diffuse bone marrow replacement process.  Conus terminates at the L1-L2 level.  Cauda equina appears grossly within normal limits.    There are degenerative changes as below:    L1-L2: Circumferential disc bulge, ligamentum flavum thickening, and bilateral facet arthropathy.  Moderate to severe spinal canal stenosis and bilateral neural foraminal narrowing.    L2-L3: Circumferential disc bulge, ligamentum flavum thickening, and bilateral facet arthropathy.  Moderate to severe spinal canal stenosis.  Severe bilateral neural foraminal narrowing.    L3-L4: Circumferential disc bulge, ligamentum flavum thickening, and bilateral facet arthropathy.  Mild spinal canal stenosis.  Severe bilateral neural foraminal narrowing.    L4-L5: Circumferential disc bulge, ligamentum flavum thickening, and bilateral facet arthropathy.  No significant spinal canal stenosis.  Severe bilateral neural foraminal narrowing, left greater than right.    L5-S1: Mild broad-based disc bulge.  No significant spinal canal stenosis.  Mild right-sided and moderate left-sided neural foraminal narrowing.    There is a linear T1/T2 hyperintensity within the left sacral ala suspicious for a nondisplaced fracture (for example series 7, image 53).  No definite discrete right sacral alar fracture identified.  There are degenerative changes of the bilateral sacroiliac joints.    There are several partially visualized bilateral renal T2 hyperintensities, possibly cysts.  Impression: Previously demonstrated T9 vertebral body fracture disrupting the bridging anterior osteophyte as well as the anterior cortex extending into the superior endplate with slight central height loss.  Mild marrow edema signal is noted within bilateral pedicles which may reflect nondisplaced component extending into the posterior elements.  Additionally, there is focal disruption of the ALL at the level of T9 as well as edema signal with focal  short segment discontinuity of the supraspinous ligament at the level of T8-T9.  No obvious disruption of PLL appreciated.  Neurosurgical consultation/evaluation is advised in light of potential instability.    Possible atypical hemangioma involving the right posterolateral aspect of the T8 vertebral body extending into the right pedicle.  There is marked associated STIR hyperintensity/marrow edema associated with the lesion which may reflect a nondisplaced fracture extending through the lesion into the right pedicle, not well appreciated on prior CT examination.    No definite thoracic cord signal abnormality.    Suspected nondisplaced left sacral alar fracture.    Advanced multilevel degenerative changes of the lumbar spine as detailed above.    This report was flagged in Epic as abnormal.    Electronically signed by: Scooter Payton MD  Date:    12/01/2024  Time:    01:56      Follow-up  Pertinent negatives include no abdominal pain, arthralgias, chest pain, fatigue or headaches.     Review of Systems   Constitutional:  Negative for fatigue and unexpected weight change.   Respiratory:  Negative for chest tightness and shortness of breath.    Cardiovascular:  Negative for chest pain, palpitations and leg swelling.   Gastrointestinal:  Negative for abdominal pain.   Musculoskeletal:  Negative for arthralgias.   Neurological:  Negative for dizziness, syncope, light-headedness and headaches.       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    Hand deformity, acquired, left    Non-rheumatic mitral regurgitation    At risk for cardiovascular event    Abdominal obesity    Long term (current) use of anticoagulants    Hypertensive renal disease    Type 2 diabetes  mellitus with stage 3a chronic kidney disease, without long-term current use of insulin    Diabetic polyneuropathy associated with type 2 diabetes mellitus    History of skin cancer in adulthood    Chronic kidney disease, stage III (moderate)    BMI 27.0-27.9,adult    Hypertension associated with diabetes    Exudative age-related macular degeneration, right eye, with active choroidal neovascularization    Closed fracture of ninth thoracic vertebra    A-fib       Objective:      Physical Exam  Vitals reviewed.   Constitutional:       General: He is not in acute distress.     Appearance: He is well-developed. He is not diaphoretic.   HENT:      Head: Normocephalic and atraumatic.      Right Ear: External ear normal.      Left Ear: External ear normal.      Nose: Nose normal.      Mouth/Throat:      Pharynx: No oropharyngeal exudate.   Eyes:      General: No scleral icterus.        Right eye: No discharge.         Left eye: No discharge.      Conjunctiva/sclera: Conjunctivae normal.      Pupils: Pupils are equal, round, and reactive to light.   Neck:      Thyroid: No thyromegaly.      Vascular: No JVD.      Trachea: No tracheal deviation.   Cardiovascular:      Rate and Rhythm: Normal rate.      Heart sounds: Normal heart sounds. No murmur heard.  Pulmonary:      Effort: Pulmonary effort is normal. No respiratory distress.      Breath sounds: Normal breath sounds. No wheezing or rales.   Abdominal:      General: Bowel sounds are normal. There is no distension.      Palpations: Abdomen is soft. There is no mass.      Tenderness: There is no abdominal tenderness. There is no guarding or rebound.   Musculoskeletal:         General: No tenderness.      Cervical back: Normal range of motion and neck supple.      Comments: Back brace in place, no signs of hematoma   Lymphadenopathy:      Cervical: No cervical adenopathy.   Skin:     General: Skin is warm and dry.      Coloration: Skin is not pale.      Findings: No erythema  or rash.   Neurological:      Mental Status: He is alert and oriented to person, place, and time.      Cranial Nerves: No cranial nerve deficit.      Coordination: Coordination normal.   Psychiatric:         Behavior: Behavior normal.         Thought Content: Thought content normal.         Judgment: Judgment normal.         Lab Results   Component Value Date    WBC 7.11 12/10/2024    HGB 15.5 12/10/2024    HCT 46.4 12/10/2024     12/10/2024    CHOL 147 12/10/2024    TRIG 98 12/10/2024    HDL 43 12/10/2024    ALT 24 12/10/2024    AST 22 12/10/2024     12/10/2024    K 3.9 12/10/2024     12/10/2024    CREATININE 1.4 12/10/2024    BUN 18 12/10/2024    CO2 25 12/10/2024    TSH 1.462 05/31/2018    PSA 2.2 01/29/2016    INR 1.0 11/30/2024    HGBA1C 6.0 (H) 12/10/2024     The ASCVD Risk score (Michell DK, et al., 2019) failed to calculate for the following reasons:    The 2019 ASCVD risk score is only valid for ages 40 to 79    Assessment:       1. Type 2 diabetes mellitus with stage 3a chronic kidney disease, without long-term current use of insulin    2. Stage 3a chronic kidney disease    3. Closed stable burst fracture of ninth thoracic vertebra with routine healing, subsequent encounter    4. Flu vaccine need        Plan:       Type 2 diabetes mellitus with stage 3a chronic kidney disease, without long-term current use of insulin  -     Hemoglobin A1C; Future; Expected date: 03/17/2025  -     Basic Metabolic Panel; Future; Expected date: 03/17/2025  -     Microalbumin/Creatinine Ratio, Urine; Future; Expected date: 03/17/2025    Stage 3a chronic kidney disease  -     Hemoglobin A1C; Future; Expected date: 03/17/2025  -     Basic Metabolic Panel; Future; Expected date: 03/17/2025  -     Microalbumin/Creatinine Ratio, Urine; Future; Expected date: 03/17/2025    Closed stable burst fracture of ninth thoracic vertebra with routine healing, subsequent encounter    Flu vaccine need  -     influenza  (adjuvanted) (Fluad) 45 mcg/0.5 mL IM vaccine (> or = 64 yo) 0.5 mL    Controlled on current medications.  Continue current medications.I counseled the patient on HTN education, management and recommendations.  I recommended weight loss toward a BMI < 25, avoidance of salt and the DASH diet, regular cardio exercise a minimum of 150 minutes per week and medications if indicated.  Printed materials were given. The goal is < 140/90 unless otherwise specified.4Ms for Medical Decision-Making in Older Adults    Last Completed EAWV: 10/3/2023    MOBILITY:  Get Up and Go:      10/3/2023     8:46 AM 3/10/2021    11:14 AM 10/22/2019    10:14 AM   Get Up and Go   Trial 1 10 seconds 10 seconds 10 seconds     Activities of Daily Living:      10/3/2023     8:47 AM   Activities of Daily Living   Ambulation Independent   Dressing Independent   Transfers Independent   Toileting Incontinent of bowel;Continent of bladder   Feeding Independent   Cleaning home/Chores Independent   Telephone use Independent   Shopping Independent   Paying bills Independent   Taking meds Independent     Whisper Test:      10/3/2023     8:47 AM   Whisper Test   Whisper Test Abnormal     Disability Status:      10/3/2023     8:47 AM   Disability Status   Are you deaf or do you have serious difficulty hearing? Y   Are you blind or do you have serious difficulty seeing, even when wearing glasses? Y   Because of a physical, mental, or emotional condition, do you have serious difficulty concentrating, remembering, or making decisions? N   Do you have serious difficulty walking or climbing stairs? N   Do you have difficulty dressing or bathing? N   Because of a physical, mental, or emotional condition, do you have difficulty doing errands alone such as visiting a doctor's office or shopping? N     Nutrition Screening:      10/3/2023     8:47 AM   Nutrition Screening   Has food intake declined over the past three months due to loss of appetite, digestive  problems, chewing or swallowing difficulties? No decrease in food intake   Involuntary weight loss during the last 3 months? No weight loss   Mobility? Goes out   Has the patient suffered psychological stress or acute disease in the past three months? No   Neuropsychological problems? No psychological problems   Body Mass Index (BMI)?  BMI 23 or greater   Screening Score 14   Interpretation Normal nutritional status    Screening Score: 0-7 Malnourished, 8-11 At Risk, 12-14 Normal  Fall Risk:      12/17/2024     9:30 AM 8/15/2024     8:30 AM 2/15/2024     8:30 AM   Fall Risk Assessment - Outpatient   Mobility Status Ambulatory Ambulatory Ambulatory   Number of falls 0 0 0   Identified as fall risk False False False           MENTATION:   Depression Patient Health Questionnaire:      2/15/2024     8:51 AM   Depression Patient Health Questionnaire   Over the last two weeks how often have you been bothered by little interest or pleasure in doing things Not at all   Over the last two weeks how often have you been bothered by feeling down, depressed or hopeless Not at all   PHQ-2 Total Score 0     Has Dementia Dx: No  Has Anxiety Dx: No    Cognitive Function Screening:      10/3/2023     8:52 AM   Cognitive Function Screening   Clock Drawing Test 2   Mini-Cog 3 Minute Recall 2   Cognitive Function Screening 4     Cognitive Function Screening Total - Less than 4 = Abnormal,  Greater than or equal to 4 = Normal        MEDICATIONS:  High Risk Medications:  Total Active Medications: 1  HYDROcodone-acetaminophen - 5-325 mg    WHAT MATTERS MOST:  Advance Care Planning   ACP Status:   Patient has had an ACP conversation  Living Will: Yes  Power of : No  [place]: No    What is most important right now is to focus on spending time at home, avoiding the hospital, remaining as independent as possible, symptom/pain control, quality of life, even if it means sacrificing a little time, extending life as long as possible, even it  it means sacrificing quality, curative/life-prolongation (regardless of treatment burdens), improvement in condition but with limits to invasive therapies, and comfort and QOL     Accordingly, we have decided that the best plan to meet the patient's goals includes continuing with treatment      What matters most to patient today is:  Pain-free         Patient readiness: acceptance and barriers:none    During the course of the visit the patient was educated and counseled about the following:     Diabetes:  Discussed general issues about diabetes pathophysiology and management.  Discussed foot care.  Reminded to get yearly retinal exam.  Return to clinic 3-4 months  Hypertension:   Dietary sodium restriction.  Check blood pressures daily and record.    Goals: Diabetes: Maintain Hemoglobin A1C below 7 and Hypertension: Reduce Blood Pressure    Did patient meet goals/outcomes: Yes    The following self management tools provided: blood pressure log    Patient Instructions (the written plan) was given to the patient/family.     Time spent with patient:  30 minutes    Barriers to medications present (no )    Adverse reactions to current medications (no)    Over the counter medications reviewed (Yes)        Thirty-minute visit.  10 minutes spent counseling patient on diet, exercise, and weight loss.  This has been fully explained to the patient, who indicates understanding.

## 2024-12-31 ENCOUNTER — HOSPITAL ENCOUNTER (OUTPATIENT)
Dept: RADIOLOGY | Facility: HOSPITAL | Age: 84
Discharge: HOME | End: 2024-12-31
Attending: NEUROLOGICAL SURGERY
Payer: MEDICARE

## 2024-12-31 DIAGNOSIS — S22.071D: ICD-10-CM

## 2024-12-31 PROCEDURE — 72070 X-RAY EXAM THORAC SPINE 2VWS: CPT | Mod: 26,,, | Performed by: RADIOLOGY

## 2024-12-31 PROCEDURE — 72070 X-RAY EXAM THORAC SPINE 2VWS: CPT | Mod: TC

## 2025-01-27 ENCOUNTER — OFFICE VISIT (OUTPATIENT)
Dept: ORTHOPEDICS | Facility: CLINIC | Age: 85
End: 2025-01-27
Payer: MEDICARE

## 2025-01-27 VITALS — HEIGHT: 73 IN | WEIGHT: 204.56 LBS | BODY MASS INDEX: 27.11 KG/M2

## 2025-01-27 DIAGNOSIS — D18.09 HEMANGIOMA OF SPINE: ICD-10-CM

## 2025-01-27 DIAGNOSIS — M51.360 DEGENERATION OF INTERVERTEBRAL DISC OF LUMBAR REGION WITH DISCOGENIC BACK PAIN: ICD-10-CM

## 2025-01-27 DIAGNOSIS — M47.816 LUMBAR SPONDYLOSIS: ICD-10-CM

## 2025-01-27 DIAGNOSIS — S22.070A COMPRESSION FRACTURE OF T9 VERTEBRA, INITIAL ENCOUNTER: Primary | ICD-10-CM

## 2025-01-27 PROCEDURE — 99203 OFFICE O/P NEW LOW 30 MIN: CPT | Mod: S$PBB,,, | Performed by: ORTHOPAEDIC SURGERY

## 2025-01-27 PROCEDURE — 99213 OFFICE O/P EST LOW 20 MIN: CPT | Mod: PBBFAC,PN | Performed by: ORTHOPAEDIC SURGERY

## 2025-01-27 PROCEDURE — 99999 PR PBB SHADOW E&M-EST. PATIENT-LVL III: CPT | Mod: PBBFAC,,, | Performed by: ORTHOPAEDIC SURGERY

## 2025-01-27 RX ORDER — PREDNISONE 10 MG/1
TABLET ORAL
COMMUNITY
Start: 2025-01-11
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
## 2025-02-21 RX ORDER — OMEPRAZOLE 20 MG/1
20 CAPSULE, DELAYED RELEASE ORAL
Qty: 90 CAPSULE | Refills: 3 | Status: SHIPPED | OUTPATIENT
Start: 2025-02-21

## 2025-02-21 RX ORDER — DILTIAZEM HYDROCHLORIDE 300 MG/1
300 CAPSULE, COATED, EXTENDED RELEASE ORAL
Qty: 90 CAPSULE | Refills: 3 | Status: SHIPPED | OUTPATIENT
Start: 2025-02-21

## 2025-02-21 NOTE — TELEPHONE ENCOUNTER
Refill Decision Note   Domitila Walters  is requesting a refill authorization.  Brief Assessment and Rationale for Refill:  Approve     Medication Therapy Plan:         Comments:     Note composed:12:03 PM 02/21/2025

## 2025-02-21 NOTE — TELEPHONE ENCOUNTER
No care due was identified.  [facility] Embedded Care Due Messages. Reference number: [id number].   2/21/2025 8:04:09 AM CST

## 2025-02-22 DIAGNOSIS — Z00.00 ENCOUNTER FOR MEDICARE ANNUAL WELLNESS EXAM: ICD-10-CM

## 2025-04-22 ENCOUNTER — LAB VISIT (OUTPATIENT)
Dept: LAB | Facility: HOSPITAL | Age: 85
End: 2025-04-22
Attending: FAMILY MEDICINE
Payer: MEDICARE

## 2025-04-22 DIAGNOSIS — N18.31 TYPE 2 DIABETES MELLITUS WITH STAGE 3A CHRONIC KIDNEY DISEASE, WITHOUT LONG-TERM CURRENT USE OF INSULIN: ICD-10-CM

## 2025-04-22 DIAGNOSIS — E11.22 TYPE 2 DIABETES MELLITUS WITH STAGE 3A CHRONIC KIDNEY DISEASE, WITHOUT LONG-TERM CURRENT USE OF INSULIN: ICD-10-CM

## 2025-04-22 DIAGNOSIS — N18.31 STAGE 3A CHRONIC KIDNEY DISEASE: ICD-10-CM

## 2025-04-22 LAB
ALBUMIN/CREAT UR: 50.9 UG/MG
ANION GAP (OHS): 5 MMOL/L (ref 8–16)
BUN SERPL-MCNC: 16 MG/DL (ref 8–23)
CALCIUM SERPL-MCNC: 9.1 MG/DL (ref 8.7–10.5)
CHLORIDE SERPL-SCNC: 106 MMOL/L (ref 95–110)
CO2 SERPL-SCNC: 28 MMOL/L (ref 23–29)
CREAT SERPL-MCNC: 1.1 MG/DL (ref 0.5–1.4)
CREAT UR-MCNC: 175 MG/DL (ref 23–375)
EAG (OHS): 137 MG/DL (ref 68–131)
GFR SERPLBLD CREATININE-BSD FMLA CKD-EPI: >60 ML/MIN/1.73/M2
GLUCOSE SERPL-MCNC: 128 MG/DL (ref 70–110)
HBA1C MFR BLD: 6.4 % (ref 4–5.6)
MICROALBUMIN UR-MCNC: 89 UG/ML (ref ?–5000)
POTASSIUM SERPL-SCNC: 4.5 MMOL/L (ref 3.5–5.1)
SODIUM SERPL-SCNC: 139 MMOL/L (ref 136–145)

## 2025-04-22 PROCEDURE — 36415 COLL VENOUS BLD VENIPUNCTURE: CPT | Mod: PO

## 2025-04-22 PROCEDURE — 80048 BASIC METABOLIC PNL TOTAL CA: CPT

## 2025-04-22 PROCEDURE — 82043 UR ALBUMIN QUANTITATIVE: CPT

## 2025-04-22 PROCEDURE — 83036 HEMOGLOBIN GLYCOSYLATED A1C: CPT

## 2025-04-23 ENCOUNTER — RESULTS FOLLOW-UP (OUTPATIENT)
Dept: PRIMARY CARE CLINIC | Facility: CLINIC | Age: 85
End: 2025-04-23

## 2025-04-24 ENCOUNTER — OFFICE VISIT (OUTPATIENT)
Dept: PRIMARY CARE CLINIC | Facility: CLINIC | Age: 85
End: 2025-04-24
Payer: MEDICARE

## 2025-04-24 VITALS
SYSTOLIC BLOOD PRESSURE: 130 MMHG | BODY MASS INDEX: 27.87 KG/M2 | HEART RATE: 82 BPM | DIASTOLIC BLOOD PRESSURE: 70 MMHG | WEIGHT: 210.31 LBS | HEIGHT: 73 IN | OXYGEN SATURATION: 97 % | TEMPERATURE: 98 F

## 2025-04-24 DIAGNOSIS — E11.69 HYPERLIPIDEMIA ASSOCIATED WITH TYPE 2 DIABETES MELLITUS: ICD-10-CM

## 2025-04-24 DIAGNOSIS — I34.0 NON-RHEUMATIC MITRAL REGURGITATION: ICD-10-CM

## 2025-04-24 DIAGNOSIS — I15.2 HYPERTENSION ASSOCIATED WITH DIABETES: ICD-10-CM

## 2025-04-24 DIAGNOSIS — S22.071D: ICD-10-CM

## 2025-04-24 DIAGNOSIS — Z12.5 ENCOUNTER FOR SCREENING FOR MALIGNANT NEOPLASM OF PROSTATE: ICD-10-CM

## 2025-04-24 DIAGNOSIS — E11.22 TYPE 2 DIABETES MELLITUS WITH STAGE 3A CHRONIC KIDNEY DISEASE, WITHOUT LONG-TERM CURRENT USE OF INSULIN: ICD-10-CM

## 2025-04-24 DIAGNOSIS — E78.5 HYPERLIPIDEMIA ASSOCIATED WITH TYPE 2 DIABETES MELLITUS: ICD-10-CM

## 2025-04-24 DIAGNOSIS — N18.31 TYPE 2 DIABETES MELLITUS WITH STAGE 3A CHRONIC KIDNEY DISEASE, WITHOUT LONG-TERM CURRENT USE OF INSULIN: ICD-10-CM

## 2025-04-24 DIAGNOSIS — E11.65 TYPE 2 DIABETES MELLITUS WITH HYPERGLYCEMIA, WITHOUT LONG-TERM CURRENT USE OF INSULIN: Primary | ICD-10-CM

## 2025-04-24 DIAGNOSIS — E11.59 HYPERTENSION ASSOCIATED WITH DIABETES: ICD-10-CM

## 2025-04-24 DIAGNOSIS — I48.20 CHRONIC ATRIAL FIBRILLATION: ICD-10-CM

## 2025-04-24 DIAGNOSIS — E66.3 OVERWEIGHT (BMI 25.0-29.9): ICD-10-CM

## 2025-04-24 DIAGNOSIS — I70.0 AORTIC ATHEROSCLEROSIS: ICD-10-CM

## 2025-04-24 PROCEDURE — 99213 OFFICE O/P EST LOW 20 MIN: CPT | Mod: PBBFAC,PO | Performed by: NURSE PRACTITIONER

## 2025-04-24 PROCEDURE — 99214 OFFICE O/P EST MOD 30 MIN: CPT | Mod: S$PBB,,, | Performed by: NURSE PRACTITIONER

## 2025-04-24 PROCEDURE — 99999 PR PBB SHADOW E&M-EST. PATIENT-LVL III: CPT | Mod: PBBFAC,,, | Performed by: NURSE PRACTITIONER

## 2025-04-24 NOTE — PROGRESS NOTES
"Subjective:       Patient ID: Domitila Walters is a 85 y.o. male.    Chief Complaint: Follow-up    Follow-up  Pertinent negatives include no chest pain, headaches or rash.     Patient presents today for follow up. He is new to me. Last visit with PCP- on 12/17/24. Labs reviewed      1/27/25 Dr.James Macias: compression fracture of T9 vertebra- Fell down and November of 2024 sustaining a compression fracture at T9 he had MRIs and CT scans he is getting better but still has some residual pain , Observation advised patient and daughter advised to contact me if any symptoms worsen or change in symptoms I reassured her and natural history of this condition is with likely resolve within the next 1-2 months     1/10/25 Ophthalmology-Dr. Usman Caruso:Exudative age-related macular degeneration, left eye, with active choroidal neovascularization /eye care 20/20 both eye injects every month    12/09/24 Cardiology-Dr.Bruce Angeles: Permanent atrial fibrillation, Weakness  Nonrheumatic mitral (valve) insufficiency, Essential (primary) hypertension  Mixed hyperlipidemia, Other malaise      12/2/24 ER visit: Patient states he tripped over a curb and fell into a tree hitting his mid thoracic and low back. 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   HENT:  Negative for trouble swallowing.    Eyes:  Negative for visual disturbance.   Respiratory:  Negative for shortness of breath.    Cardiovascular:  Negative for chest pain, palpitations and leg " "swelling.   Gastrointestinal:  Negative for blood in stool, constipation and diarrhea.   Genitourinary:  Negative for hematuria.   Skin:  Negative for rash.   Allergic/Immunologic: Negative for immunocompromised state.   Neurological:  Negative for headaches.   Hematological:  Does not bruise/bleed easily.   Psychiatric/Behavioral:  Negative for agitation. The patient is not nervous/anxious.        Objective:      /70 (BP Location: Right arm, Patient Position: Sitting)   Pulse 82   Temp 97.9 °F (36.6 °C) (Oral)   Ht 6' 1" (1.854 m)   Wt 95.4 kg (210 lb 5.1 oz)   SpO2 97%   BMI 27.75 kg/m²   Physical Exam  Constitutional:       Appearance: He is well-developed.   Eyes:      Conjunctiva/sclera: Conjunctivae normal.      Pupils: Pupils are equal, round, and reactive to light.   Cardiovascular:      Rate and Rhythm: Normal rate and regular rhythm.      Heart sounds: Normal heart sounds.   Pulmonary:      Effort: Pulmonary effort is normal.   Musculoskeletal:         General: Normal range of motion.   Neurological:      Mental Status: He is alert and oriented to person, place, and time.   Psychiatric:         Behavior: Behavior normal.         Thought Content: Thought content normal.         Judgment: Judgment normal.         Assessment:       1. Type 2 diabetes mellitus with hyperglycemia, without long-term current use of insulin    2. Type 2 diabetes mellitus with stage 3a chronic kidney disease, without long-term current use of insulin    3. Hypertension associated with diabetes    4. Hyperlipidemia associated with type 2 diabetes mellitus    5. Encounter for screening for malignant neoplasm of prostate    6. Aortic atherosclerosis    7. Chronic atrial fibrillation    8. Non-rheumatic mitral regurgitation    9. Closed stable burst fracture of ninth thoracic vertebra with routine healing, subsequent encounter    10. Overweight (BMI 25.0-29.9)        Plan:       Type 2 diabetes mellitus with hyperglycemia, " without long-term current use of insulin  -     Hemoglobin A1C; Future; Expected date: 04/24/2025  -     Lipid Panel; Future; Expected date: 04/24/2025  -     Comprehensive Metabolic Panel; Future; Expected date: 04/24/2025  -     CBC W/ AUTO DIFFERENTIAL; Future; Expected date: 04/24/2025  Stable, continue management  Follow the ADA diet  Hemoglobin A1C   Date Value Ref Range Status   12/10/2024 6.0 (H) 4.0 - 5.6 % Final     Comment:     ADA Screening Guidelines:  5.7-6.4%  Consistent with prediabetes  >or=6.5%  Consistent with diabetes    High levels of fetal hemoglobin interfere with the HbA1C  assay. Heterozygous hemoglobin variants (HbS, HgC, etc)do  not significantly interfere with this assay.   However, presence of multiple variants may affect accuracy.     12/01/2024 6.6 (H) 4.5 - 6.2 % Final     Comment:     According to ADA guidelines, hemoglobin A1C <7.0% represents  optimal control in non-pregnant diabetic patients.  Different  metrics may apply to specific populations.   Standards of Medical Care in Diabetes - 2016.    For the purpose of screening for the presence of diabetes:  <5.7%     Consistent with the absence of diabetes  5.7-6.4%  Consistent with increasing risk for diabetes   (prediabetes)  >or=6.5%  Consistent with diabetes    Currently no consensus exists for use of hemoglobin A1C  for diagnosis of diabetes for children.     08/08/2024 6.0 (H) 4.0 - 5.6 % Final     Comment:     ADA Screening Guidelines:  5.7-6.4%  Consistent with prediabetes  >or=6.5%  Consistent with diabetes    High levels of fetal hemoglobin interfere with the HbA1C  assay. Heterozygous hemoglobin variants (HbS, HgC, etc)do  not significantly interfere with this assay.   However, presence of multiple variants may affect accuracy.       Hemoglobin A1c   Date Value Ref Range Status   04/22/2025 6.4 (H) 4.0 - 5.6 % Final     Comment:     ADA Screening Guidelines:  5.7-6.4%  Consistent with prediabetes  >=6.5%  Consistent with  "diabetes    High levels of fetal hemoglobin interfere with the HbA1C  assay. Heterozygous hemoglobin variants (HbS, HgC, etc)do  not significantly interfere with this assay.   However, presence of multiple variants may affect accuracy.      Type 2 diabetes mellitus with stage 3a chronic kidney disease, without long-term current use of insulin  -     Comprehensive Metabolic Panel; Future; Expected date: 04/24/2025  -     CBC W/ AUTO DIFFERENTIAL; Future; Expected date: 04/24/2025  Stable and chronic.  Will continue to monitor q3-6 months and control chronic conditions as optimally as possible to preserve function.   Hypertension associated with diabetes  Stable, continue management  Low sodium diet  BP Readings from Last 3 Encounters:   04/24/25 130/70   12/17/24 128/80   12/02/24 (!) 169/99      Hyperlipidemia associated with type 2 diabetes mellitus  Stable, on Pravachol  Encounter for screening for malignant neoplasm of prostate  -     PSA, Screening; Future; Expected date: 04/24/2025    Aortic atherosclerosis  Stable, continue follow up   Chronic atrial fibrillation  Stable, on Eliquis  Continue follow up  Non-rheumatic mitral regurgitation  Stable, continue follow up  Closed stable burst fracture of ninth thoracic vertebra with routine healing, subsequent encounter  Stable, continue follow up  Overweight (BMI 25.0-29.9)  Counseled patient on his ideal body weight, health consequences of being obese and current recommendations including weekly exercise and a heart healthy diet.  Current BMI is:Estimated body mass index is 27.75 kg/m² as calculated from the following:    Height as of this encounter: 6' 1" (1.854 m).    Weight as of this encounter: 95.4 kg (210 lb 5.1 oz)..  Patient is aware that ideal BMI < 25 or Weight in (lb) to have BMI = 25: 189.1.           Patient readiness: acceptance and barriers:none    During the course of the visit the patient was educated and counseled about the following: "     Diabetes:  Discussed general issues about diabetes pathophysiology and management.  Addressed ADA diet.  Encouraged aerobic exercise.    Goals: Hypertension: Reduce Blood Pressure    Did patient meet goals/outcomes: Yes    The following self management tools provided: declined    Patient Instructions (the written plan) was given to the patient/family.     Time spent with patient: 30 minutes    Barriers to medications present (no )    Adverse reactions to current medications (no)    Over the counter medications reviewed (Yes)               [1]   Current Outpatient Medications:     acarbose (PRECOSE) 25 MG Tab, TAKE ONE TABLET BY MOUTH TWICE DAILY WITH MEALS, Disp: 180 tablet, Rfl: 3    calcium-vitamin D3 (OS-BROOKLYNN 500 + D3) 500 mg-5 mcg (200 unit) per tablet, Take 1 tablet by mouth 2 (two) times daily with meals., Disp: , Rfl:     diltiaZEM (CARDIZEM CD) 300 MG 24 hr capsule, TAKE ONE CAPSULE BY MOUTH EVERY DAY, Disp: 90 capsule, Rfl: 3    ELIQUIS 5 mg Tab, TAKE ONE TABLET BY MOUTH TWICE DAILY, Disp: 180 tablet, Rfl: 3    FOLBIC 2.5-25-2 mg Tab, TAKE ONE TABLET BY MOUTH DAILY, Disp: 90 tablet, Rfl: 3    lancets Misc, 1 each by Misc.(Non-Drug; Combo Route) route 2 (two) times daily with meals., Disp: 100 each, Rfl: 11    omeprazole (PRILOSEC) 20 MG capsule, TAKE ONE CAPSULE BY MOUTH ONCE DAILY, Disp: 90 capsule, Rfl: 3    pravastatin (PRAVACHOL) 40 MG tablet, TAKE ONE TABLET BY MOUTH ONCE DAILY, Disp: 90 tablet, Rfl: 3    predniSONE (DELTASONE) 10 MG tablet, Take by mouth., Disp: , Rfl:     beta-carotene,A,-vits C,E/mins (OCUVITE ORAL), Take by mouth., Disp: , Rfl:     blood sugar diagnostic Strp, 1 strip by Misc.(Non-Drug; Combo Route) route 2 (two) times daily with meals., Disp: 100 strip, Rfl: 11    blood-glucose meter Misc, 1 Device by Misc.(Non-Drug; Combo Route) route 2 (two) times daily with meals., Disp: 1 each, Rfl: 0    cholecalciferol, vitamin D3, 1,000 unit capsule, Take 1 capsule (1,000 Units total) by  mouth once daily., Disp: 90 capsule, Rfl: 11    HYDROcodone-acetaminophen (NORCO) 5-325 mg per tablet, Take 2 tablets by mouth every 6 (six) hours as needed for Pain. (Patient not taking: Reported on 4/24/2025), Disp: 20 tablet, Rfl: 0    lancing device with lancets Kit, 1 Device by Misc.(Non-Drug; Combo Route) route 2 (two) times daily with meals., Disp: 1 each, Rfl: 0

## 2025-05-19 ENCOUNTER — OFFICE VISIT (OUTPATIENT)
Dept: PRIMARY CARE CLINIC | Facility: CLINIC | Age: 85
End: 2025-05-19
Payer: MEDICARE

## 2025-05-19 ENCOUNTER — PATIENT MESSAGE (OUTPATIENT)
Dept: FAMILY MEDICINE | Facility: CLINIC | Age: 85
End: 2025-05-19
Payer: MEDICARE

## 2025-05-19 VITALS
WEIGHT: 208.31 LBS | BODY MASS INDEX: 27.61 KG/M2 | HEART RATE: 70 BPM | TEMPERATURE: 98 F | DIASTOLIC BLOOD PRESSURE: 88 MMHG | SYSTOLIC BLOOD PRESSURE: 130 MMHG | HEIGHT: 73 IN | OXYGEN SATURATION: 95 %

## 2025-05-19 DIAGNOSIS — N50.89 MASS OF SCROTUM: Primary | ICD-10-CM

## 2025-05-19 DIAGNOSIS — Z12.5 ENCOUNTER FOR SCREENING FOR MALIGNANT NEOPLASM OF PROSTATE: ICD-10-CM

## 2025-05-19 PROCEDURE — 99213 OFFICE O/P EST LOW 20 MIN: CPT | Mod: S$PBB,,, | Performed by: NURSE PRACTITIONER

## 2025-05-19 PROCEDURE — 99999 PR PBB SHADOW E&M-EST. PATIENT-LVL V: CPT | Mod: PBBFAC,,, | Performed by: NURSE PRACTITIONER

## 2025-05-19 PROCEDURE — 99215 OFFICE O/P EST HI 40 MIN: CPT | Mod: PBBFAC,PO | Performed by: NURSE PRACTITIONER

## 2025-05-19 NOTE — PROGRESS NOTES
"Subjective:       Patient ID: Domitila Walters is a 85 y.o. male.    Chief Complaint: Bloodwork    HPI    Patient presents today for follow up visit. He request a PSA, last PSA 2016.He declines urinary urgency frequency, dribbling. Patient reports a small mass of scrotum for a couple of days.    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   HENT:  Negative for trouble swallowing.    Eyes:  Negative for visual disturbance.   Respiratory:  Negative for shortness of breath.    Cardiovascular:  Negative for chest pain, palpitations and leg swelling.   Gastrointestinal:  Negative for blood in stool.   Genitourinary:  Negative for hematuria.   Skin:  Negative for rash.   Allergic/Immunologic: Negative for immunocompromised state.   Neurological:  Negative for headaches.   Hematological:  Does not bruise/bleed easily.   Psychiatric/Behavioral:  Negative for agitation. The patient is not nervous/anxious.        Objective:      /88 (BP Location: Left arm, Patient Position: Sitting)   Pulse 70   Temp 97.8 °F (36.6 °C) (Oral)   Ht 6' 1" (1.854 m)   Wt 94.5 kg (208 lb 5.4 oz)   SpO2 95%   BMI 27.49 kg/m²   Physical Exam  Constitutional:       Appearance: He is well-developed.   Eyes:      Conjunctiva/sclera: Conjunctivae normal.      Pupils: Pupils are equal, round, and reactive to light.   Cardiovascular:      Rate and Rhythm: Normal rate and regular rhythm.      Heart sounds: Normal heart sounds.   Pulmonary:      Effort: Pulmonary effort is normal.   Genitourinary:     Comments: Deferred "   Musculoskeletal:         General: Normal range of motion.   Neurological:      Mental Status: He is alert and oriented to person, place, and time.   Psychiatric:         Behavior: Behavior normal.         Thought Content: Thought content normal.         Judgment: Judgment normal.         Assessment:       1. Mass of scrotum    2. Encounter for screening for malignant neoplasm of prostate        Plan:       Mass of scrotum  -     PSA, Screening; Future; Expected date: 05/19/2025  -      Scrotum And Testicles; Future; Expected date: 05/19/2025  -     Ambulatory referral/consult to Urology; Future; Expected date: 05/26/2025    Encounter for screening for malignant neoplasm of prostate  -     PSA, Screening; Future; Expected date: 05/19/2025        Time spent with patient: 30 minutes    Patient with be reevaluated in 4 weeks or sooner prn    Greater than 50% of this visit was spent counseling as described in above documentation:Yes        [1]   Current Outpatient Medications:     acarbose (PRECOSE) 25 MG Tab, TAKE ONE TABLET BY MOUTH TWICE DAILY WITH MEALS, Disp: 180 tablet, Rfl: 3    calcium-vitamin D3 (OS-BROOKLYNN 500 + D3) 500 mg-5 mcg (200 unit) per tablet, Take 1 tablet by mouth 2 (two) times daily with meals., Disp: , Rfl:     diltiaZEM (CARDIZEM CD) 300 MG 24 hr capsule, TAKE ONE CAPSULE BY MOUTH EVERY DAY, Disp: 90 capsule, Rfl: 3    ELIQUIS 5 mg Tab, TAKE ONE TABLET BY MOUTH TWICE DAILY, Disp: 180 tablet, Rfl: 3    FOLBIC 2.5-25-2 mg Tab, TAKE ONE TABLET BY MOUTH DAILY, Disp: 90 tablet, Rfl: 3    HYDROcodone-acetaminophen (NORCO) 5-325 mg per tablet, Take 2 tablets by mouth every 6 (six) hours as needed for Pain., Disp: 20 tablet, Rfl: 0    lancets Misc, 1 each by Misc.(Non-Drug; Combo Route) route 2 (two) times daily with meals., Disp: 100 each, Rfl: 11    omeprazole (PRILOSEC) 20 MG capsule, TAKE ONE CAPSULE BY MOUTH ONCE DAILY, Disp: 90 capsule, Rfl: 3    pravastatin (PRAVACHOL) 40 MG tablet, TAKE ONE TABLET  BY MOUTH ONCE DAILY, Disp: 90 tablet, Rfl: 3    predniSONE (DELTASONE) 10 MG tablet, Take by mouth., Disp: , Rfl:     blood-glucose meter Misc, 1 Device by Misc.(Non-Drug; Combo Route) route 2 (two) times daily with meals., Disp: 1 each, Rfl: 0    lancing device with lancets Kit, 1 Device by Misc.(Non-Drug; Combo Route) route 2 (two) times daily with meals., Disp: 1 each, Rfl: 0

## 2025-05-20 ENCOUNTER — RESULTS FOLLOW-UP (OUTPATIENT)
Dept: PRIMARY CARE CLINIC | Facility: CLINIC | Age: 85
End: 2025-05-20

## 2025-05-20 ENCOUNTER — HOSPITAL ENCOUNTER (OUTPATIENT)
Dept: RADIOLOGY | Facility: HOSPITAL | Age: 85
Discharge: HOME | End: 2025-05-20
Attending: NURSE PRACTITIONER
Payer: MEDICARE

## 2025-05-20 DIAGNOSIS — N50.89 MASS OF SCROTUM: ICD-10-CM

## 2025-05-21 DIAGNOSIS — M79.2 PERIPHERAL NEUROPATHIC PAIN: ICD-10-CM

## 2025-05-21 RX ORDER — FOLIC ACID-PYRIDOXINE-CYANOCOBALAMIN TAB 2.5-25-2 MG 2.5-25-2 MG
1 TAB ORAL
Qty: 90 TABLET | Refills: 3 | Status: SHIPPED | OUTPATIENT
Start: 2025-05-21

## 2025-05-21 NOTE — TELEPHONE ENCOUNTER
Refill Routing Note   Medication(s) are not appropriate for processing by [facility] for the following reason(s):        Outside of protocol    [facility] action(s):  Route               Appointments  past 12m or future 3m with PCP    Date Provider   Last Visit   12/17/2024 Isiah Crespo MD   Next Visit   Visit date not found Isiah Crespo MD   ED visits in past 90 days: 0        Note composed:9:32 AM 05/21/2025

## 2025-05-21 NOTE — TELEPHONE ENCOUNTER
Goal Outcome Evaluation:      No wean today.                                       Orders placed.

## 2025-06-13 ENCOUNTER — RESULTS FOLLOW-UP (OUTPATIENT)
Dept: UROLOGY | Facility: CLINIC | Age: 85
End: 2025-06-13

## 2025-06-13 ENCOUNTER — LAB VISIT (OUTPATIENT)
Dept: LAB | Facility: HOSPITAL | Age: 85
End: 2025-06-13
Attending: UROLOGY
Payer: MEDICARE

## 2025-06-13 ENCOUNTER — OFFICE VISIT (OUTPATIENT)
Dept: UROLOGY | Facility: CLINIC | Age: 85
End: 2025-06-13
Payer: MEDICARE

## 2025-06-13 DIAGNOSIS — R97.20 ELEVATED PSA: Primary | ICD-10-CM

## 2025-06-13 DIAGNOSIS — R97.20 ELEVATED PSA: ICD-10-CM

## 2025-06-13 LAB — PSA SERPL-MCNC: 3.98 NG/ML (ref ?–4)

## 2025-06-13 PROCEDURE — 36415 COLL VENOUS BLD VENIPUNCTURE: CPT

## 2025-06-13 PROCEDURE — 99999 PR PBB SHADOW E&M-EST. PATIENT-LVL III: CPT | Mod: PBBFAC,,, | Performed by: UROLOGY

## 2025-06-13 PROCEDURE — 84153 ASSAY OF PSA TOTAL: CPT

## 2025-06-13 PROCEDURE — 99213 OFFICE O/P EST LOW 20 MIN: CPT | Mod: PBBFAC,PO | Performed by: UROLOGY
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
